# Patient Record
Sex: MALE | Race: BLACK OR AFRICAN AMERICAN | Employment: OTHER | URBAN - METROPOLITAN AREA
[De-identification: names, ages, dates, MRNs, and addresses within clinical notes are randomized per-mention and may not be internally consistent; named-entity substitution may affect disease eponyms.]

---

## 2018-08-06 PROBLEM — K74.60 CIRRHOSIS (HCC): Status: ACTIVE | Noted: 2018-08-06

## 2019-04-18 ENCOUNTER — APPOINTMENT (OUTPATIENT)
Dept: GENERAL RADIOLOGY | Age: 64
DRG: 280 | End: 2019-04-18
Payer: MEDICAID

## 2019-04-18 ENCOUNTER — HOSPITAL ENCOUNTER (INPATIENT)
Age: 64
LOS: 5 days | Discharge: HOME | DRG: 280 | End: 2019-04-23
Attending: EMERGENCY MEDICINE | Admitting: HOSPITALIST
Payer: MEDICAID

## 2019-04-18 DIAGNOSIS — E83.51 HYPOCALCEMIA: ICD-10-CM

## 2019-04-18 DIAGNOSIS — J90 PLEURAL EFFUSION: ICD-10-CM

## 2019-04-18 DIAGNOSIS — R60.1 ANASARCA: Primary | ICD-10-CM

## 2019-04-18 DIAGNOSIS — K70.31 ASCITES DUE TO ALCOHOLIC CIRRHOSIS (HCC): ICD-10-CM

## 2019-04-18 LAB
A/G RATIO: 0.4 (ref 1.1–2.2)
ALBUMIN SERPL-MCNC: 2.3 G/DL (ref 3.4–5)
ALP BLD-CCNC: 179 U/L (ref 40–129)
ALT SERPL-CCNC: 15 U/L (ref 10–40)
AMPHETAMINE SCREEN, URINE: ABNORMAL
ANION GAP SERPL CALCULATED.3IONS-SCNC: 8 MMOL/L (ref 3–16)
APTT: 43.9 SEC (ref 26–36)
AST SERPL-CCNC: 33 U/L (ref 15–37)
BARBITURATE SCREEN URINE: ABNORMAL
BASOPHILS ABSOLUTE: 0 K/UL (ref 0–0.2)
BASOPHILS RELATIVE PERCENT: 0.8 %
BENZODIAZEPINE SCREEN, URINE: ABNORMAL
BILIRUB SERPL-MCNC: 5.1 MG/DL (ref 0–1)
BILIRUBIN URINE: NEGATIVE
BLOOD, URINE: ABNORMAL
BUN BLDV-MCNC: 7 MG/DL (ref 7–20)
CALCIUM SERPL-MCNC: 7.7 MG/DL (ref 8.3–10.6)
CANNABINOID SCREEN URINE: ABNORMAL
CHLORIDE BLD-SCNC: 106 MMOL/L (ref 99–110)
CLARITY: CLEAR
CO2: 23 MMOL/L (ref 21–32)
COCAINE METABOLITE SCREEN URINE: ABNORMAL
COLOR: YELLOW
CREAT SERPL-MCNC: <0.5 MG/DL (ref 0.8–1.3)
EKG ATRIAL RATE: 81 BPM
EKG DIAGNOSIS: NORMAL
EKG P AXIS: 36 DEGREES
EKG P-R INTERVAL: 130 MS
EKG Q-T INTERVAL: 394 MS
EKG QRS DURATION: 72 MS
EKG QTC CALCULATION (BAZETT): 457 MS
EKG R AXIS: -19 DEGREES
EKG T AXIS: -5 DEGREES
EKG VENTRICULAR RATE: 81 BPM
EOSINOPHILS ABSOLUTE: 0.1 K/UL (ref 0–0.6)
EOSINOPHILS RELATIVE PERCENT: 1.2 %
EPITHELIAL CELLS, UA: 2 /HPF (ref 0–5)
ETHANOL: NORMAL MG/DL (ref 0–0.08)
GFR AFRICAN AMERICAN: >60
GFR NON-AFRICAN AMERICAN: >60
GLOBULIN: 5.3 G/DL
GLUCOSE BLD-MCNC: 140 MG/DL (ref 70–99)
GLUCOSE URINE: NEGATIVE MG/DL
HCT VFR BLD CALC: 36.1 % (ref 40.5–52.5)
HEMOGLOBIN: 11.7 G/DL (ref 13.5–17.5)
HYALINE CASTS: 2 /LPF (ref 0–8)
INR BLD: 1.93 (ref 0.86–1.14)
KETONES, URINE: NEGATIVE MG/DL
LEUKOCYTE ESTERASE, URINE: ABNORMAL
LYMPHOCYTES ABSOLUTE: 0.9 K/UL (ref 1–5.1)
LYMPHOCYTES RELATIVE PERCENT: 19 %
Lab: ABNORMAL
MAGNESIUM: 1.7 MG/DL (ref 1.8–2.4)
MCH RBC QN AUTO: 35.3 PG (ref 26–34)
MCHC RBC AUTO-ENTMCNC: 32.4 G/DL (ref 31–36)
MCV RBC AUTO: 109 FL (ref 80–100)
METHADONE SCREEN, URINE: ABNORMAL
MICROSCOPIC EXAMINATION: YES
MONOCYTES ABSOLUTE: 1 K/UL (ref 0–1.3)
MONOCYTES RELATIVE PERCENT: 20.2 %
NEUTROPHILS ABSOLUTE: 2.8 K/UL (ref 1.7–7.7)
NEUTROPHILS RELATIVE PERCENT: 58.8 %
NITRITE, URINE: NEGATIVE
OPIATE SCREEN URINE: POSITIVE
OXYCODONE URINE: ABNORMAL
PDW BLD-RTO: 16.4 % (ref 12.4–15.4)
PH UA: 6.5
PH UA: 7 (ref 5–8)
PHENCYCLIDINE SCREEN URINE: ABNORMAL
PLATELET # BLD: 66 K/UL (ref 135–450)
PLATELET SLIDE REVIEW: ABNORMAL
PMV BLD AUTO: 8.9 FL (ref 5–10.5)
POTASSIUM SERPL-SCNC: 3.8 MMOL/L (ref 3.5–5.1)
PRO-BNP: 108 PG/ML (ref 0–124)
PROPOXYPHENE SCREEN: ABNORMAL
PROTEIN UA: NEGATIVE MG/DL
PROTHROMBIN TIME: 22 SEC (ref 9.8–13)
RBC # BLD: 3.31 M/UL (ref 4.2–5.9)
RBC UA: 39 /HPF (ref 0–4)
SLIDE REVIEW: ABNORMAL
SODIUM BLD-SCNC: 137 MMOL/L (ref 136–145)
SPECIFIC GRAVITY UA: 1.01 (ref 1–1.03)
TOTAL PROTEIN: 7.6 G/DL (ref 6.4–8.2)
TROPONIN: <0.01 NG/ML
URINE REFLEX TO CULTURE: YES
URINE TYPE: ABNORMAL
UROBILINOGEN, URINE: 4 E.U./DL
WBC # BLD: 4.8 K/UL (ref 4–11)
WBC UA: 16 /HPF (ref 0–5)

## 2019-04-18 PROCEDURE — 6370000000 HC RX 637 (ALT 250 FOR IP): Performed by: HOSPITALIST

## 2019-04-18 PROCEDURE — 96375 TX/PRO/DX INJ NEW DRUG ADDON: CPT

## 2019-04-18 PROCEDURE — 2500000003 HC RX 250 WO HCPCS: Performed by: EMERGENCY MEDICINE

## 2019-04-18 PROCEDURE — 80307 DRUG TEST PRSMV CHEM ANLYZR: CPT

## 2019-04-18 PROCEDURE — 2580000003 HC RX 258: Performed by: HOSPITALIST

## 2019-04-18 PROCEDURE — 81001 URINALYSIS AUTO W/SCOPE: CPT

## 2019-04-18 PROCEDURE — 93005 ELECTROCARDIOGRAM TRACING: CPT | Performed by: PHYSICIAN ASSISTANT

## 2019-04-18 PROCEDURE — 85610 PROTHROMBIN TIME: CPT

## 2019-04-18 PROCEDURE — 84484 ASSAY OF TROPONIN QUANT: CPT

## 2019-04-18 PROCEDURE — 71046 X-RAY EXAM CHEST 2 VIEWS: CPT

## 2019-04-18 PROCEDURE — 99285 EMERGENCY DEPT VISIT HI MDM: CPT

## 2019-04-18 PROCEDURE — 6360000002 HC RX W HCPCS: Performed by: PHYSICIAN ASSISTANT

## 2019-04-18 PROCEDURE — 87186 SC STD MICRODIL/AGAR DIL: CPT

## 2019-04-18 PROCEDURE — 1200000000 HC SEMI PRIVATE

## 2019-04-18 PROCEDURE — 80053 COMPREHEN METABOLIC PANEL: CPT

## 2019-04-18 PROCEDURE — 83735 ASSAY OF MAGNESIUM: CPT

## 2019-04-18 PROCEDURE — 96376 TX/PRO/DX INJ SAME DRUG ADON: CPT

## 2019-04-18 PROCEDURE — 6360000002 HC RX W HCPCS: Performed by: HOSPITALIST

## 2019-04-18 PROCEDURE — 85025 COMPLETE CBC W/AUTO DIFF WBC: CPT

## 2019-04-18 PROCEDURE — G0480 DRUG TEST DEF 1-7 CLASSES: HCPCS

## 2019-04-18 PROCEDURE — 96366 THER/PROPH/DIAG IV INF ADDON: CPT

## 2019-04-18 PROCEDURE — 85730 THROMBOPLASTIN TIME PARTIAL: CPT

## 2019-04-18 PROCEDURE — 51702 INSERT TEMP BLADDER CATH: CPT

## 2019-04-18 PROCEDURE — 83880 ASSAY OF NATRIURETIC PEPTIDE: CPT

## 2019-04-18 PROCEDURE — 87086 URINE CULTURE/COLONY COUNT: CPT

## 2019-04-18 PROCEDURE — 6360000002 HC RX W HCPCS: Performed by: NURSE PRACTITIONER

## 2019-04-18 PROCEDURE — 2580000003 HC RX 258: Performed by: PHYSICIAN ASSISTANT

## 2019-04-18 PROCEDURE — 87077 CULTURE AEROBIC IDENTIFY: CPT

## 2019-04-18 PROCEDURE — 96365 THER/PROPH/DIAG IV INF INIT: CPT

## 2019-04-18 PROCEDURE — 93010 ELECTROCARDIOGRAM REPORT: CPT | Performed by: INTERNAL MEDICINE

## 2019-04-18 RX ORDER — SODIUM CHLORIDE 0.9 % (FLUSH) 0.9 %
10 SYRINGE (ML) INJECTION PRN
Status: DISCONTINUED | OUTPATIENT
Start: 2019-04-18 | End: 2019-04-23 | Stop reason: HOSPADM

## 2019-04-18 RX ORDER — NICOTINE 21 MG/24HR
1 PATCH, TRANSDERMAL 24 HOURS TRANSDERMAL DAILY
Status: DISCONTINUED | OUTPATIENT
Start: 2019-04-18 | End: 2019-04-23 | Stop reason: HOSPADM

## 2019-04-18 RX ORDER — ONDANSETRON 2 MG/ML
4 INJECTION INTRAMUSCULAR; INTRAVENOUS EVERY 6 HOURS PRN
Status: DISCONTINUED | OUTPATIENT
Start: 2019-04-18 | End: 2019-04-23 | Stop reason: HOSPADM

## 2019-04-18 RX ORDER — KETOROLAC TROMETHAMINE 15 MG/ML
15 INJECTION, SOLUTION INTRAMUSCULAR; INTRAVENOUS ONCE
Status: COMPLETED | OUTPATIENT
Start: 2019-04-18 | End: 2019-04-18

## 2019-04-18 RX ORDER — FUROSEMIDE 10 MG/ML
80 INJECTION INTRAMUSCULAR; INTRAVENOUS 2 TIMES DAILY
Status: DISCONTINUED | OUTPATIENT
Start: 2019-04-18 | End: 2019-04-19

## 2019-04-18 RX ORDER — SODIUM CHLORIDE 0.9 % (FLUSH) 0.9 %
10 SYRINGE (ML) INJECTION EVERY 12 HOURS SCHEDULED
Status: DISCONTINUED | OUTPATIENT
Start: 2019-04-18 | End: 2019-04-23 | Stop reason: HOSPADM

## 2019-04-18 RX ORDER — FUROSEMIDE 10 MG/ML
80 INJECTION INTRAMUSCULAR; INTRAVENOUS ONCE
Status: COMPLETED | OUTPATIENT
Start: 2019-04-18 | End: 2019-04-18

## 2019-04-18 RX ORDER — HYDROMORPHONE HYDROCHLORIDE 1 MG/ML
1 INJECTION, SOLUTION INTRAMUSCULAR; INTRAVENOUS; SUBCUTANEOUS
Status: COMPLETED | OUTPATIENT
Start: 2019-04-18 | End: 2019-04-18

## 2019-04-18 RX ORDER — ONDANSETRON 2 MG/ML
4 INJECTION INTRAMUSCULAR; INTRAVENOUS ONCE
Status: COMPLETED | OUTPATIENT
Start: 2019-04-18 | End: 2019-04-18

## 2019-04-18 RX ORDER — MORPHINE SULFATE 2 MG/ML
2 INJECTION, SOLUTION INTRAMUSCULAR; INTRAVENOUS ONCE
Status: COMPLETED | OUTPATIENT
Start: 2019-04-18 | End: 2019-04-18

## 2019-04-18 RX ADMIN — FUROSEMIDE 80 MG: 10 INJECTION, SOLUTION INTRAMUSCULAR; INTRAVENOUS at 19:07

## 2019-04-18 RX ADMIN — FUROSEMIDE 80 MG: 10 INJECTION, SOLUTION INTRAVENOUS at 11:25

## 2019-04-18 RX ADMIN — KETOROLAC TROMETHAMINE 15 MG: 15 INJECTION, SOLUTION INTRAMUSCULAR; INTRAVENOUS at 22:47

## 2019-04-18 RX ADMIN — HYDROMORPHONE HYDROCHLORIDE 1 MG: 1 INJECTION, SOLUTION INTRAMUSCULAR; INTRAVENOUS; SUBCUTANEOUS at 11:25

## 2019-04-18 RX ADMIN — ONDANSETRON 4 MG: 2 INJECTION INTRAMUSCULAR; INTRAVENOUS at 11:25

## 2019-04-18 RX ADMIN — MORPHINE SULFATE 2 MG: 2 INJECTION, SOLUTION INTRAMUSCULAR; INTRAVENOUS at 12:21

## 2019-04-18 RX ADMIN — Medication 10 ML: at 22:48

## 2019-04-18 RX ADMIN — CALCIUM GLUCONATE 1 G: 94 INJECTION, SOLUTION INTRAVENOUS at 12:59

## 2019-04-18 RX ADMIN — HYDROMORPHONE HYDROCHLORIDE 1 MG: 1 INJECTION, SOLUTION INTRAMUSCULAR; INTRAVENOUS; SUBCUTANEOUS at 14:32

## 2019-04-18 SDOH — HEALTH STABILITY: MENTAL HEALTH: HOW OFTEN DO YOU HAVE A DRINK CONTAINING ALCOHOL?: NEVER

## 2019-04-18 ASSESSMENT — ENCOUNTER SYMPTOMS
WHEEZING: 0
VOMITING: 0
RECTAL PAIN: 0
SHORTNESS OF BREATH: 0
DIARRHEA: 0
ABDOMINAL PAIN: 0
NAUSEA: 0
BACK PAIN: 0
ABDOMINAL DISTENTION: 1
CONSTIPATION: 0
COUGH: 0
COLOR CHANGE: 0
STRIDOR: 0

## 2019-04-18 ASSESSMENT — PAIN DESCRIPTION - LOCATION
LOCATION: GENERALIZED
LOCATION: GENERALIZED

## 2019-04-18 ASSESSMENT — PAIN SCALES - GENERAL
PAINLEVEL_OUTOF10: 10
PAINLEVEL_OUTOF10: 6
PAINLEVEL_OUTOF10: 10

## 2019-04-18 ASSESSMENT — PAIN DESCRIPTION - PAIN TYPE
TYPE: ACUTE PAIN
TYPE: ACUTE PAIN

## 2019-04-18 NOTE — ED PROVIDER NOTES
I independently performed a history and physical on [employer]. All diagnostic, treatment, and disposition decisions were made by myself in conjunction with the advanced practice provider. Briefly, this is a 61 y.o. male here for a rather significant lower extremity edema, and actually generally anasarca. This been worse over the past couple weeks. He states that he has pain all over. Denies alcohol use. .  Symptoms are moderate to severe worsening with activity. He does live at home  See YUNI note      On exam:  Constitutional:  Well developed, well nourished, non-toxic appearance . Anasarca  Eyes:  PERRL, conjunctiva normal   HENT:  Atraumatic, external ears normal,   Respiratory:  No respiratory distress, normal breath sounds, + rales, no wheezing   Cardiovascular:  Normal rate, normal rhythm,   GI:  Soft, nondistended, nontender, no obvious organomegaly, no mass, no rebound, no guarding   Musculoskeletal:  No tenderness, no deformities. Significant lower extremity edema, which appears symmetrical.  Integument:  no rashes on exposed surfaces  Neurologic:  Alert & oriented x 3,  normalmotor function, normal sensory function, no focal deficits noted   Psychiatric:  Speech and behavior appropriate. No agitation. EKG     EKG 12 Lead (Preliminary result)    Component (Lab Inquiry)   Collection Time Result Time Ventricular Rate Atrial Rate P-R Interval QRS Duration Q-T Interval QTc Calculation (Bazett) P Axis R Axis T Axis Diagnosis   04/18/19 10:59:12 04/18/19 11:17:04 81 81 130 72 394 457 36 -19 -5 Sinus rhythm with Premature atrial complexes with Aberrant conductionNonspecific T wave abnormalityAbnormal ECG           Screenings            MDM    Amalia Butler is to admit. Patient has quite an impressive physical exam finding of anasarca, lower extremity edema. He is quite uncomfortable. Although vital signs look reassuring today. He does have a low calcium for which we will replete in the ER. Other labs can be added on, etc.      SEE YUNI NOTE      Critical Care  There was a high probability of life-threatening clinical deterioration in the patient's condition requiring my urgent intervention. Total critical care time with the patient was 36 minutes excluding separately reportable procedures. Critical care required due to patients hypocalcemia, anasarca      Patient Referrals:  No follow-up provider specified. Discharge Medications:  New Prescriptions    No medications on file       FINAL IMPRESSION  1. Anasarca    2. Pleural effusion    3. Hypocalcemia        Blood pressure (!) 144/88, pulse 90, temperature 97.9 °F (36.6 °C), temperature source Oral, resp. rate 18, height 6' 2\" (1.88 m), weight 275 lb (124.7 kg), SpO2 97 %. For further details of [facility] emergency department encounter, please see documentation by advanced practice provider.        Georgie Kerr III,   04/18/19 6085

## 2019-04-18 NOTE — ED NOTES
Pharmacy Medication History Note      List of current medications patient is taking is complete. Source of information: patient    Changes made to medication list:  Medications flagged for removal (include reason, ex. noncompliance):  N/A    Medications removed (include reason, ex. therapy complete or physician discontinued):  N/A    Medications added/doses adjusted:  N/A    Other notes (ex. Recent course of antibiotics, Coumadin dosing):  Denies use of other OTC or herbal medications. Last dose times updated. [facility]    No current facility-administered medications on file prior to encounter.         Current Outpatient Medications on File Prior to Encounter   Medication Sig Dispense Refill    [DISCONTINUED] furosemide (LASIX) 40 MG tablet Take 1 tablet by mouth daily 60 tablet 3    [DISCONTINUED] spironolactone (ALDACTONE) 100 MG tablet Take 1 tablet by mouth daily 30 tablet 3    [DISCONTINUED] nicotine (NICODERM CQ) 14 MG/24HR Place 1 patch onto the skin daily 30 patch 3    [DISCONTINUED] Multiple Vitamins-Minerals (THERAPEUTIC MULTIVITAMIN-MINERALS) tablet Take 1 tablet by mouth daily 30 tablet 0

## 2019-04-18 NOTE — ED NOTES
Pt medicated as charted - given a urinal - lights dimmed. Pt updated on POC. Pt positioned for comfort. Call light in reach. Bed locked and in low position. Pt denies any needs or concerns at this time.      Rob Khan RN  04/18/19 8830

## 2019-04-18 NOTE — H&P
Hospital Medicine History & Physical      PCP: No primary care provider on file. Date of Admission: 4/18/2019    Date of Service: Pt seen/examined on 04/18/19 and Admitted to Inpatient with expected LOS greater than two midnights due to medical therapy. Chief Complaint   Patient presents with    Leg Swelling     Pt reports edema in several places, left arm, legs, abdomen x 2 weeks. Pt does not have a doctor. States today he just couldn't take it anymore. History Of Present Illness:   Wen Claros is a 61 y.o. male with PMx of anasarca and alcoholic cirrhosis of liver with ascites, who presented to [facility] ED due to peripheral edema in the lower extremities, penis, scrotum and abdomen that has significantly increased over the past few months. Pt reports having a similar episode of swelling in August of 2018 for which he received inpatient treatment; was discharged on lasix but states he has been noncompliant with medication or follow up. He reports gaining 75lbs in the past few month, and the swelling to be increasingly uncomfortable. He denies shortness of breath or chest pain. He denies any recent alcohol consumption, states his last drink was in August of 2018. He smokes cigarettes 1/4 ppd. Past Medical History:  History reviewed. No pertinent past medical history. Past Surgical History:    History reviewed. No pertinent surgical history. Medications Prior to Admission:    Prior to Admission medications    Not on File       Allergies:  Patient has no known allergies. Social History:    The patient currently lives at home by himself     TOBACCO:   reports that he has been smoking. He has been smoking about 0.25 packs per day. He has never used smokeless tobacco.  ETOH:   reports that he drank alcohol. Family History:     Reviewed in detail and negative for DM, CAD, Cancer, CVA.  Positive as follows:        Problem Relation Age of Onset    High Blood Pressure Mother        REVIEW OF SYSTEMS:   Pertinent positives as noted in the HPI. All other systems reviewed and negative. PHYSICAL EXAM PERFORMED:  /81   Pulse 80   Temp 97.9 °F (36.6 °C) (Oral)   Resp 12   Ht 6' 2\" (1.88 m)   Wt 275 lb (124.7 kg)   SpO2 99%   BMI 35.31 kg/m²     General appearance:  No apparent distress, appears stated age and cooperative. HEENT:  Normal cephalic, atraumatic without obvious deformity. Pupils equal, round, and reactive to light. Extra ocular muscles intact. Conjunctivae/corneas clear. Scleral icterus is present   Neck: Supple, with full range of motion. No jugular venous distention. Trachea midline. Respiratory:  Normal respiratory effort. Clear to auscultation, bilaterally without Rales/Wheezes/Rhonchi. Cardiovascular:  Regular rate and rhythm with normal S1/S2 without murmurs, rubs or gallops. Abdomen: Tense, he exhibits significant ascites and umbilical hernia is present. He is non-tender with normal bowel sounds. Musculoskeletal:  No clubbing, cyanosis, erythema. Full range of motion without deformity. Skin: Skin color, texture, turgor normal.  No rashes or lesions. 3+ peripheral edema:significant lower extremity, abdomina, pubic, and scrotal edema. Neurologic:  Neurovascularly intact without any focal sensory/motor deficits.  Cranial nerves: II-XII intact, grossly non-focal.  Psychiatric:  Alert and oriented, thought content appropriate, normal insight  Capillary Refill: Brisk,< 3 seconds   Peripheral Pulses: +2 palpable, equal bilaterally       Labs:   Recent Labs     04/18/19  1056   WBC 4.8   HGB 11.7*   HCT 36.1*   PLT 66*     Recent Labs     04/18/19  1056      K 3.8      CO2 23   BUN 7   CREATININE <0.5*   CALCIUM 7.7*     Recent Labs     04/18/19  1056   AST 33   ALT 15   BILITOT 5.1*   ALKPHOS 179*     Recent Labs     04/18/19  1056   INR 1.93*     Recent Labs     04/18/19  1056   TROPONINI <0.01       Urinalysis:    Lab Results Component Value Date    NITRU Negative 04/18/2019    WBCUA 16 04/18/2019    BACTERIA 3+ 08/06/2018    RBCUA 39 04/18/2019    BLOODU MODERATE 04/18/2019    SPECGRAV 1.008 04/18/2019    GLUCOSEU Negative 04/18/2019       XR CHEST STANDARD (2 VW)   Final Result   Large left pleural effusion with likely underlying airspace disease   reflecting atelectasis or infiltrate. Other underlying pathology not   excluded. Recommend follow-up to resolution. Mild right basilar likely scarring or atelectasis. Small right pleural   effusion not entirely excluded. Active Hospital Problems    Diagnosis Date Noted    Anasarca [R60.1] 04/18/2019   Pleural Effusion   Hypervolemia secondary to liver cirrhosis   Chronic macrocytic anemia     ASSESSMENT/PLAN:  1. Hypervolemia secondary to liver cirrhosis   -Lasix 80mg BID   -Paracentesis   -Strict I/Os and daily weights  -Repeat BMP in am   -Consult GI for further evaluation and management     2. Pleural Effusion   -CXR reveled large left sided pleural effusion   -Pt not complaining of shortness of breath or chest pain. Plan to start removing fluid from abdomen first then schedule thoracentesis    3. Chronic Macrocytic Anemia   -Likely due to liver cirrhosis, repeat CBC in am and monitor     4. Tobacco abuse   -advised smoking cessation and offered nicotine patch     5. Hx of alcohol abuse   -Pt denies any alcohol consumption since August of 2018  -Advised patient to continue to refrain from any alcohol use     DVT Prophylaxis: Lovenox   Diet: No diet orders on file  Code Status: Prior      Patient case seen and discussed under supervision of preceptor, Dr. Cindi Soliman MD.   Note made by PA-S2 student in the status of a scribe, with review by preceptor. Kasandra MACHUCA-S2     Addendum to PA student H& P note:  Pt seen,examined and evaluated with BRIGETTE farr , who acted as my scribe for the above documentation. . I have reviewed the current history, physical findings, labs and assessment and plan and agree with note as documented      Octaviano Marie MD  Hospitalist Physician     The note was completed using EMR. Every effort was made to ensure accuracy; However, inadvertent computerized transcription errors may be present. Thank you No primary care provider on file. for the opportunity to be involved in this patient's care. If you have any questions or concerns please feel free to contact me at [phone].

## 2019-04-18 NOTE — ED NOTES
Assume patient care at this time. Agree with previous assessment - pt with pitting edema in all extremities. No resp distress noted. PIV placed and labs obtained. Current Plan of Care reviewed with patient. Pt denies any needs or questions at this time. Bed locked and in low position, with side rails up x 2.       Bridget Carrera RN  04/18/19 2622

## 2019-04-18 NOTE — ED PROVIDER NOTES
[address] [facility]  eMERGENCY dEPARTMENT eNCOUnter        Pt Name: Michel Palacios  MRN: [id number]  Robgfzachariah 1955  Date of evaluation: 4/18/2019  Provider: Peggy Braon PA-C  PCP: No primary care provider on file. This patient was seen and evaluated by the attending physician Darylene Simmering III, [address] [facility]       Chief Complaint   Patient presents with    Leg Swelling     Pt reports edema in several places, left arm, legs, abdomen x 2 weeks. Pt does not have a doctor. States today he just couldn't take it anymore. HISTORY OF PRESENT ILLNESS   (Location/Symptom, Timing/Onset, Context/Setting, Quality, Duration, Modifying Factors, Severity)  Note limiting factors. Michel Palacios is a 61 y.o. male who presents to the emergency department complaining of peripheral edema in the lower extremities, penis, scrotum and abdomen. This has been a progressive issue for several months. He does have history of anasarca but admits that he has been noncompliant with follow-up and taking medications. He has not taken any medications for approximately 8 months. He says that the swelling is uncomfortable and rates his discomfort to be a 10 out of 10 on pain scale. Despite this, patient is laughing during history and does not appear to be writhing in pain, pale, diaphoretic or in acute distress. He is not an alcoholic. Denies shortness of breath or chest pain. Nursing Notes were all reviewed and agreed with or any disagreements were addressed  in the HPI. REVIEW OF SYSTEMS    (2-9 systems for level 4, 10 or more for level 5)     Review of Systems   Constitutional: Positive for unexpected weight change (weight gain). Negative for chills and fever. Eyes: Negative for visual disturbance. Respiratory: Negative for cough, shortness of breath, wheezing and stridor. Cardiovascular: Positive for leg swelling.  Negative for chest pain and palpitations. Gastrointestinal: Positive for abdominal distention. Negative for abdominal pain, constipation, diarrhea, nausea, rectal pain and vomiting. Genitourinary: Positive for penile swelling and scrotal swelling. Negative for difficulty urinating, dysuria and urgency. Musculoskeletal: Negative for back pain, neck pain and neck stiffness. Skin: Negative for color change, pallor, rash and wound. Neurological: Negative for dizziness, tremors, seizures, syncope, facial asymmetry, speech difficulty, weakness, light-headedness, numbness and headaches. Psychiatric/Behavioral: Negative for confusion. All other systems reviewed and are negative. Positives and Pertinent negatives as per HPI. Except as noted abovein the ROS, all other systems were reviewed and negative. PAST MEDICAL HISTORY   History reviewed. No pertinent past medical history. SURGICAL HISTORY   History reviewed. No pertinent surgical history. CURRENTMEDICATIONS       Previous Medications    No medications on file         ALLERGIES     Patient has no known allergies.     FAMILYHISTORY       Family History   Problem Relation Age of Onset    High Blood Pressure Mother           SOCIAL HISTORY       Social History     Socioeconomic History    Marital status:      Spouse name: None    Number of children: None    Years of education: None    Highest education level: None   Occupational History    None   Social Needs    Financial resource strain: None    Food insecurity:     Worry: None     Inability: None    Transportation needs:     Medical: None     Non-medical: None   Tobacco Use    Smoking status: Current Every Day Smoker     Packs/day: 0.25    Smokeless tobacco: Never Used   Substance and Sexual Activity    Alcohol use: Not Currently     Frequency: Never    Drug use: None    Sexual activity: None   Lifestyle    Physical activity:     Days per week: None     Minutes per session: None    Stress: None   Relationships    Social connections:     Talks on phone: None     Gets together: None     Attends Anabaptist service: None     Active member of club or organization: None     Attends meetings of clubs or organizations: None     Relationship status: None    Intimate partner violence:     Fear of current or ex partner: None     Emotionally abused: None     Physically abused: None     Forced sexual activity: None   Other Topics Concern    None   Social History Narrative    None       SCREENINGS             PHYSICAL EXAM    (up to 7 for level 4, 8 or more for level 5)     ED Triage Vitals [04/18/19 1014]   BP Temp Temp Source Pulse Resp SpO2 Height Weight   (!) 144/88 97.9 °F (36.6 °C) Oral 90 18 97 % 6' 2\" (1.88 m) 275 lb (124.7 kg)       Physical Exam   Constitutional: He is oriented to person, place, and time. He appears well-developed and well-nourished. No distress. HENT:   Head: Normocephalic and atraumatic. Right Ear: External ear normal.   Left Ear: External ear normal.   Nose: Nose normal.   Mouth/Throat: Oropharynx is clear and moist.   Eyes: Pupils are equal, round, and reactive to light. Conjunctivae and EOM are normal. Right eye exhibits no discharge. Left eye exhibits no discharge. Scleral icterus is present. Neck: Normal range of motion. No JVD present. Cardiovascular: Normal rate. Pulmonary/Chest: Effort normal.   Diminished bibasilar breath sounds   Abdominal: Bowel sounds are normal. He exhibits ascites. There is no tenderness. There is no rigidity, no rebound, no guarding, no CVA tenderness, no tenderness at McBurney's point and negative Rodgers's sign. Musculoskeletal: Normal range of motion. Lymphadenopathy:     He has no cervical adenopathy. Neurological: He is alert and oriented to person, place, and time. Skin: Skin is warm and dry. Capillary refill takes less than 2 seconds. No rash noted. He is not diaphoretic. No erythema. No pallor.    Significant lower extremity, abdominal, pubic, penile, scrotal edema. Psychiatric: He has a normal mood and affect. His behavior is normal.   Nursing note and vitals reviewed.       DIAGNOSTIC RESULTS   LABS:    Labs Reviewed   CBC WITH AUTO DIFFERENTIAL - Abnormal; Notable for the following components:       Result Value    RBC 3.31 (*)     Hemoglobin 11.7 (*)     Hematocrit 36.1 (*)     .0 (*)     MCH 35.3 (*)     RDW 16.4 (*)     Platelets 66 (*)     Lymphocytes # 0.9 (*)     All other components within normal limits    Narrative:     Performed at:  [facility] [address], [place], [facility]   Phone [phone]   COMPREHENSIVE METABOLIC PANEL - Abnormal; Notable for the following components:    Glucose 140 (*)     CREATININE <0.5 (*)     Calcium 7.7 (*)     Alb 2.3 (*)     Albumin/Globulin Ratio 0.4 (*)     Total Bilirubin 5.1 (*)     Alkaline Phosphatase 179 (*)     All other components within normal limits    Narrative:     Performed at:  [facility] [address], [place], [facility]   Phone [phone]   URINE RT REFLEX TO CULTURE - Abnormal; Notable for the following components:    Blood, Urine MODERATE (*)     Urobilinogen, Urine 4.0 (*)     Leukocyte Esterase, Urine SMALL (*)     All other components within normal limits    Narrative:     Performed at:  [facility] [address], [place], [facility]   Phone [phone]   APTT - Abnormal; Notable for the following components:    aPTT 43.9 (*)     All other components within normal limits    Narrative:     Performed at:  [facility] [address], [place], [facility]   Phone [phone]   PROTIME-INR - Abnormal; Notable for the following components:    Protime 22.0 (*)     INR 1.93 (*)     All other components within normal limits    Narrative:     Performed at:  [facility]  [facility],  [facility], [address] [facility]   Phone [phone]   MAGNESIUM - Abnormal; Notable for the following components:    Magnesium 1.70 (*)     All other components within normal limits    Narrative:     Performed at:  [facility] [address],  [facility], [address]   Phone [phone]   MICROSCOPIC URINALYSIS - Abnormal; Notable for the following components:    WBC, UA 16 (*)     RBC, UA 39 (*)     All other components within normal limits    Narrative:     Performed at:  [facility] [address],  [facility], [address]   Phone [phone]   URINE CULTURE   TROPONIN    Narrative:     Performed at:  [facility] [address],  [facility], [address]   Phone [phone]    Narrative:     Performed at:  [facility] [address],  [facility], [address]   Phone [phone]   URINE DRUG SCREEN   ETHANOL       All other labs were within normal range or not returned as of this dictation. EKG: All EKG's are interpreted by the Emergency Department Physician who either signs orCo-signs this chart in the absence of a cardiologist.  Please see their note for interpretation of EKG. RADIOLOGY:   Non-plain film images such as CT, Ultrasound and MRI are read by the radiologist. Plain radiographic images are visualized andpreliminarily interpreted by the  ED Provider with the below findings:        Interpretation perthe Radiologist below, if available at the time of this note:    XR CHEST STANDARD (2 VW)   Preliminary Result   Large left pleural effusion with likely underlying airspace disease   reflecting atelectasis or infiltrate. Other underlying pathology not   excluded. Recommend follow-up to resolution. Mild right basilar likely scarring or atelectasis. Small right pleural   effusion not entirely excluded. PROCEDURES   Unless otherwise noted below, none     Procedures    CRITICAL CARE TIME   N/A    CONSULTS:  Maximo Baker HOSPITALIST  Dr. Raffi Lockhart will admit [phone]) wants us to add on ethanol and urine drug screen. Says to wait for urine culture. No instruction for antibiotic at this time. EMERGENCY DEPARTMENT COURSE and DIFFERENTIALDIAGNOSIS/MDM:   Vitals:    Vitals:    04/18/19 1014   BP: (!) 144/88   Pulse: 90   Resp: 18   Temp: 97.9 °F (36.6 °C)   TempSrc: Oral   SpO2: 97%   Weight: 275 lb (124.7 kg)   Height: 6' 2\" (1.88 m)       Patient was given thefollowing medications:  Medications   calcium gluconate 1 g in dextrose 5 % 100 mL IVPB (has no administration in time range)   morphine (PF) injection 2 mg (2 mg Intravenous Given 4/18/19 1221)   ondansetron (ZOFRAN) injection 4 mg (4 mg Intravenous Given 4/18/19 1125)   furosemide (LASIX) injection 80 mg (80 mg Intravenous Given 4/18/19 1125)   HYDROmorphone HCl PF (DILAUDID) injection 1 mg (1 mg Intravenous Given 4/18/19 1125)     This patient presents with anasarca. This is a chronic issue for the patient but has been noncompliant with medications. He has severe edema to the abdomen, scrotal/pubic area, lower extremities. Blood work reveals hypocalcemia, hypoalbuminemia, hyperbilirubinemia, hypomagnesemia. Chest x-ray is showing large pleural effusion. I did order a medication for discomfort, Lasix for fluid overloaded status, calcium for hypocalcemia. We do feel admission is warranted for further evaluation and treatment. Patient understands and agrees with plan. My suspicion is low for ACS, PE, myocarditis, pericarditis, endocarditis,  pericardial effusion, cardiac tamponade,  thoracic aortic dissection, esophageal rupture, other life-threatening arrhythmia, hypertensive urgency or emergency, hemothorax, pulmonary contusion, subcutaneous emphysema, flail chest, pneumo mediastinum, rib fracture, pneumonia, acute surgical abdomen, SBP,

## 2019-04-18 NOTE — CARE COORDINATION
Discharge Planning Assessment  SW discharge planner met with patient to discuss reason for admission, current living situation, and potential needs at the time of discharge. Pt in ED d/t anasarca and pleural effusion    Demographics/Insurance verified Yes    Current type of dwelling:  House    Living arrangements:  Alone     Level of function/Support:  Pt reported independent with ADLs and functioning. Pt reports still speaks with spouse but does not live together. PCP:  None. Pt requested assistance finding PCP. SW provided [phone] number to call to find [facility] PCP. Last Visit to PCP:  n/a    DME:  None. No needs reported. Active with any community resources/agencies/skilled home care:  None. No non-skilled needs reported. Medication compliance issues:  Pt reported has not been taking medications d/t cost.  Pt agreeable to resources. SW provided local, national and online resources to help pay for meds. SW also informed Medicaid insurance helps pay for a large part of most med costs. Financial issues that could impact healthcare:  No    Transportation at the time of discharge:  Self    Tentative discharge plan:  Home most likely. Provided [id number] to help find PCP and medication assistance program information. No other needs stated at this time.     Electronically signed by BUNNY Tran, SANDORW on 4/18/2019 at 2:30 PM

## 2019-04-19 ENCOUNTER — APPOINTMENT (OUTPATIENT)
Dept: INTERVENTIONAL RADIOLOGY/VASCULAR | Age: 64
DRG: 280 | End: 2019-04-19
Payer: MEDICAID

## 2019-04-19 LAB
ANION GAP SERPL CALCULATED.3IONS-SCNC: 5 MMOL/L (ref 3–16)
BUN BLDV-MCNC: 8 MG/DL (ref 7–20)
CALCIUM SERPL-MCNC: 7.6 MG/DL (ref 8.3–10.6)
CHLORIDE BLD-SCNC: 105 MMOL/L (ref 99–110)
CO2: 27 MMOL/L (ref 21–32)
CREAT SERPL-MCNC: 0.7 MG/DL (ref 0.8–1.3)
GFR AFRICAN AMERICAN: >60
GFR NON-AFRICAN AMERICAN: >60
GLUCOSE BLD-MCNC: 118 MG/DL (ref 70–99)
HCT VFR BLD CALC: 32.8 % (ref 40.5–52.5)
HEMOGLOBIN: 10.9 G/DL (ref 13.5–17.5)
MAGNESIUM: 1.6 MG/DL (ref 1.8–2.4)
MCH RBC QN AUTO: 35.1 PG (ref 26–34)
MCHC RBC AUTO-ENTMCNC: 33.3 G/DL (ref 31–36)
MCV RBC AUTO: 105.4 FL (ref 80–100)
PDW BLD-RTO: 16 % (ref 12.4–15.4)
PHOSPHORUS: 2.6 MG/DL (ref 2.5–4.9)
PLATELET # BLD: 70 K/UL (ref 135–450)
PMV BLD AUTO: 9.2 FL (ref 5–10.5)
POTASSIUM SERPL-SCNC: 3.1 MMOL/L (ref 3.5–5.1)
RBC # BLD: 3.11 M/UL (ref 4.2–5.9)
SODIUM BLD-SCNC: 137 MMOL/L (ref 136–145)
WBC # BLD: 6.1 K/UL (ref 4–11)

## 2019-04-19 PROCEDURE — 6370000000 HC RX 637 (ALT 250 FOR IP): Performed by: HOSPITALIST

## 2019-04-19 PROCEDURE — 49083 ABD PARACENTESIS W/IMAGING: CPT

## 2019-04-19 PROCEDURE — 80048 BASIC METABOLIC PNL TOTAL CA: CPT

## 2019-04-19 PROCEDURE — 6360000002 HC RX W HCPCS: Performed by: HOSPITALIST

## 2019-04-19 PROCEDURE — 85027 COMPLETE CBC AUTOMATED: CPT

## 2019-04-19 PROCEDURE — 84100 ASSAY OF PHOSPHORUS: CPT

## 2019-04-19 PROCEDURE — 83735 ASSAY OF MAGNESIUM: CPT

## 2019-04-19 PROCEDURE — 6370000000 HC RX 637 (ALT 250 FOR IP): Performed by: INTERNAL MEDICINE

## 2019-04-19 PROCEDURE — C1729 CATH, DRAINAGE: HCPCS

## 2019-04-19 PROCEDURE — 1200000000 HC SEMI PRIVATE

## 2019-04-19 PROCEDURE — 36415 COLL VENOUS BLD VENIPUNCTURE: CPT

## 2019-04-19 PROCEDURE — 2580000003 HC RX 258: Performed by: HOSPITALIST

## 2019-04-19 RX ORDER — MORPHINE SULFATE 2 MG/ML
2 INJECTION, SOLUTION INTRAMUSCULAR; INTRAVENOUS
Status: DISCONTINUED | OUTPATIENT
Start: 2019-04-19 | End: 2019-04-21

## 2019-04-19 RX ORDER — SPIRONOLACTONE 25 MG/1
100 TABLET ORAL DAILY
Status: DISCONTINUED | OUTPATIENT
Start: 2019-04-19 | End: 2019-04-23 | Stop reason: HOSPADM

## 2019-04-19 RX ORDER — POTASSIUM CHLORIDE 20 MEQ/1
40 TABLET, EXTENDED RELEASE ORAL PRN
Status: DISCONTINUED | OUTPATIENT
Start: 2019-04-19 | End: 2019-04-23 | Stop reason: HOSPADM

## 2019-04-19 RX ORDER — FUROSEMIDE 40 MG/1
40 TABLET ORAL DAILY
Status: DISCONTINUED | OUTPATIENT
Start: 2019-04-19 | End: 2019-04-20

## 2019-04-19 RX ORDER — POTASSIUM CHLORIDE 7.45 MG/ML
10 INJECTION INTRAVENOUS PRN
Status: DISCONTINUED | OUTPATIENT
Start: 2019-04-19 | End: 2019-04-23 | Stop reason: HOSPADM

## 2019-04-19 RX ORDER — OXYCODONE HYDROCHLORIDE 5 MG/1
5 TABLET ORAL EVERY 4 HOURS PRN
Status: DISCONTINUED | OUTPATIENT
Start: 2019-04-19 | End: 2019-04-20

## 2019-04-19 RX ADMIN — OXYCODONE HYDROCHLORIDE 5 MG: 5 TABLET ORAL at 21:52

## 2019-04-19 RX ADMIN — SPIRONOLACTONE 100 MG: 25 TABLET ORAL at 08:50

## 2019-04-19 RX ADMIN — MORPHINE SULFATE 2 MG: 2 INJECTION, SOLUTION INTRAMUSCULAR; INTRAVENOUS at 15:38

## 2019-04-19 RX ADMIN — MORPHINE SULFATE 2 MG: 2 INJECTION, SOLUTION INTRAMUSCULAR; INTRAVENOUS at 23:04

## 2019-04-19 RX ADMIN — MORPHINE SULFATE 2 MG: 2 INJECTION, SOLUTION INTRAMUSCULAR; INTRAVENOUS at 18:30

## 2019-04-19 RX ADMIN — OXYCODONE HYDROCHLORIDE 5 MG: 5 TABLET ORAL at 17:13

## 2019-04-19 RX ADMIN — POTASSIUM CHLORIDE 40 MEQ: 1500 TABLET, EXTENDED RELEASE ORAL at 12:36

## 2019-04-19 RX ADMIN — MORPHINE SULFATE 2 MG: 2 INJECTION, SOLUTION INTRAMUSCULAR; INTRAVENOUS at 12:32

## 2019-04-19 RX ADMIN — Medication 10 ML: at 23:05

## 2019-04-19 RX ADMIN — FUROSEMIDE 80 MG: 10 INJECTION, SOLUTION INTRAMUSCULAR; INTRAVENOUS at 08:18

## 2019-04-19 RX ADMIN — OXYCODONE HYDROCHLORIDE 5 MG: 5 TABLET ORAL at 13:49

## 2019-04-19 RX ADMIN — OXYCODONE HYDROCHLORIDE 5 MG: 5 TABLET ORAL at 08:51

## 2019-04-19 ASSESSMENT — PAIN SCALES - GENERAL
PAINLEVEL_OUTOF10: 9
PAINLEVEL_OUTOF10: 7
PAINLEVEL_OUTOF10: 7
PAINLEVEL_OUTOF10: 8
PAINLEVEL_OUTOF10: 0
PAINLEVEL_OUTOF10: 9
PAINLEVEL_OUTOF10: 7
PAINLEVEL_OUTOF10: 10
PAINLEVEL_OUTOF10: 10
PAINLEVEL_OUTOF10: 7

## 2019-04-19 NOTE — PLAN OF CARE
Problem: Falls - Risk of:  Goal: Will remain free from falls  Description  Will remain free from falls  4/19/2019 1550 by Helen Prabhakar RN  Outcome: Ongoing  4/19/2019 0322 by Dave Kraus RN  Outcome: Ongoing  4/19/2019 0321 by Dave Kraus RN  Outcome: Ongoing  Note:   Assess risk factors for falls  Assess fall prevention measures  Assist ambulation and toileting    4/19/2019 0320 by Dave Kraus RN  Outcome: Ongoing  Note:   Assess risk factors for falls  Assess fall prevention measures  Assist ambulation and toileting    Goal: Absence of physical injury  Description  Absence of physical injury  4/19/2019 1550 by Helen Prabhakar RN  Outcome: Ongoing  4/19/2019 0322 by Dave Kraus RN  Outcome: Ongoing     Problem: Pain:  Goal: Pain level will decrease  Description  Pain level will decrease  4/19/2019 1550 by Helen Prabhakar RN  Outcome: Ongoing  4/19/2019 0322 by Dave Kraus RN  Outcome: Ongoing  4/19/2019 0321 by Dave Kraus RN  Outcome: Ongoing  4/19/2019 0320 by Dave Kraus RN  Outcome: Ongoing  Goal: Control of acute pain  Description  Control of acute pain  4/19/2019 1550 by Helen Prabhakar RN  Outcome: Ongoing  4/19/2019 0322 by Dave Kraus RN  Outcome: Ongoing  Goal: Control of chronic pain  Description  Control of chronic pain  4/19/2019 1550 by Helen Prabhakar RN  Outcome: Ongoing  4/19/2019 0322 by Dave Kraus RN  Outcome: Ongoing     Problem: Fluid Volume:  Goal: Hemodynamic stability will improve  Description  Hemodynamic stability will improve  4/19/2019 1550 by Helen Prabhakar RN  Outcome: Ongoing  4/19/2019 0322 by Dave Kraus RN  Outcome: Ongoing  4/19/2019 0321 by Dave Kraus RN  Outcome: Ongoing  4/19/2019 0320 by Dave Kraus RN  Outcome: Ongoing  Goal: Ability to maintain a balanced intake and output will improve  Description  Ability to maintain a balanced intake and output will improve  4/19/2019 1550 by Gabriela José RN  Outcome: Ongoing  4/19/2019 0322 by Xena Miller RN  Outcome: Ongoing  4/19/2019 0321 by Xena Miller RN  Outcome: Ongoing  4/19/2019 0320 by Xena Miller RN  Outcome: Ongoing     Problem: Respiratory:  Goal: Respiratory status will improve  Description  Respiratory status will improve  4/19/2019 1550 by Gabriela José RN  Outcome: Ongoing  4/19/2019 0322 by Xena Miller RN  Outcome: Ongoing  4/19/2019 0321 by Xena Miller RN  Outcome: Ongoing  4/19/2019 0320 by Xena Miller RN  Outcome: Ongoing

## 2019-04-19 NOTE — PROGRESS NOTES
8000ml of fluid removed  Spoke to patients CHANG daly and advised her the amount of fluid removed and that patient was returning to the floor.

## 2019-04-19 NOTE — PLAN OF CARE
Problem: Falls - Risk of:  Goal: Will remain free from falls  Description  Will remain free from falls  4/19/2019 0322 by Eloisa Woods RN  Outcome: Ongoing  4/19/2019 0321 by Eloisa Woods RN  Outcome: Ongoing  Note:   Assess risk factors for falls  Assess fall prevention measures  Assist ambulation and toileting    Goal: Absence of physical injury  Description  Absence of physical injury  Outcome: Ongoing     Problem: Pain:  Goal: Pain level will decrease  Description  Pain level will decrease  4/19/2019 0322 by Eloisa Woods RN  Outcome: Ongoing    Goal: Control of acute pain  Description  Control of acute pain  Outcome: Ongoing  Goal: Control of chronic pain  Description  Control of chronic pain  Outcome: Ongoing     Problem: Fluid Volume:  Goal: Hemodynamic stability will improve  Description  Hemodynamic stability will improve  4/19/2019 0322 by Eloisa Woods RN  Outcome: Ongoing  Goal: Ability to maintain a balanced intake and output will improve  Description  Ability to maintain a balanced intake and output will improve  4/19/2019 0322 by Eloisa Woods RN  Outcome: Ongoing     Problem: Respiratory:  Goal: Respiratory status will improve  Description  Respiratory status will improve  4/19/2019 0322 by Eloisa Woods RN  Outcome: Ongoing     Problem: Respiratory:  Intervention: Provide positioning for optimal respiratory function and comfort  Note:   Ongoing   The care plan and education has been reviewed and mutually agreed upon with the patient.

## 2019-04-19 NOTE — CONSULTS
Gastroenterology Consult Note      Patient: Dariel Holguin  : 1955  CSN#:      Date:  2019    Subjective:       History of Present Illness  Patient is a 61 y.o.  male admitted with Anasarca [R60.1]  Anasarca [R60.1] who is seen in consult for ascites. Pt diagnosed with ETOH cirrhosis in 2018. Pt did not f/u and stopped diuretics due to lack of insurance. Abd girth has been increasing over several months with a 75# wt gain and scrotal edema. No abd pain other than from distention. No fevers. Pt denies any ETOH since diagnosis. Liver workup was otherwise unremarkable. Hep C Ab + but RNA neg. No melena      History reviewed. No pertinent past medical history. History reviewed. No pertinent surgical history. Admission Meds  No current facility-administered medications on file prior to encounter. No current outpatient medications on file prior to encounter. Patient denies ASA, NSAID use. Allergies  No Known Allergies   Social   Social History     Tobacco Use    Smoking status: Current Every Day Smoker     Packs/day: 0.25    Smokeless tobacco: Never Used   Substance Use Topics    Alcohol use: Not Currently     Frequency: Never        Family History   Problem Relation Age of Onset    High Blood Pressure Mother       No family history of colon cancer, Crohn's disease, or ulcerative colitis. Review of Systems  Pertinent items are noted in HPI. Physical Exam  Blood pressure 132/78, pulse 87, temperature 97.6 °F (36.4 °C), temperature source Temporal, resp. rate 14, height 6' 2\" (1.88 m), weight 265 lb (120.2 kg), SpO2 95 %. General appearance: alert, cooperative, no distress, appears stated age  Eyes: Anicteric  Head: Normocephalic, without obvious abnormality  Lungs: clear to auscultation bilaterally, decr BS.  Normal Effort  Heart: regular rate and rhythm, normal S1 and S2, no murmurs or rubs  Abdomen: tense abd with umbilical

## 2019-04-19 NOTE — PROGRESS NOTES
Call played to IR, spoke to interventional radiologist, he is to come in and have nursing staff look into procedure. Informed patient.

## 2019-04-19 NOTE — PROGRESS NOTES
Hospitalist Progress Note      PCP: No primary care provider on file. Date of Admission: 4/18/2019      Chief Complaint   Patient presents with    Leg Swelling     Pt reports edema in several places, left arm, legs, abdomen x 2 weeks. Pt does not have a doctor. States today he just couldn't take it anymore. Hospital Course:   Denisha Lancaster is a 61 y.o. male with PMx of anasarca and alcoholic cirrhosis of liver with ascites, who presented to [facility]  due to peripheral edema in the lower extremities, penis, scrotum and abdomen that has significantly increased over the past few months. Hep C Ab + but RNA neg. Subjective:   Pt seen awake lying in bed. Pt reports he is having significant scrotal pain due to swelling. He received 5mg of oxycodone this morning but did not experience any relief. No acute events overnight. Pt resting well, diet ok. Denies chest pain, shortness of breath. Labs reviewed. Ancillary notes reviewed. Medications:  Reviewed    Infusion Medications   Scheduled Medications    furosemide  40 mg Oral Daily    spironolactone  100 mg Oral Daily    sodium chloride flush  10 mL Intravenous 2 times per day    nicotine  1 patch Transdermal Daily     PRN Meds: oxyCODONE, sodium chloride flush, magnesium hydroxide, ondansetron      Intake/Output Summary (Last 24 hours) at 4/19/2019 1144  Last data filed at 4/19/2019 0559  Gross per 24 hour   Intake 720 ml   Output 2275 ml   Net -1555 ml       Physical Exam Performed:    /78   Pulse 87   Temp 97.6 °F (36.4 °C) (Temporal)   Resp 14   Ht 6' 2\" (1.88 m)   Wt 265 lb (120.2 kg)   SpO2 95%   BMI 34.02 kg/m²     General appearance: No apparent distress, appears stated age and cooperative. HEENT: Pupils equal, round, and reactive to light. Conjunctivae/corneas clear. Sclera Icterus is present. Neck: Supple, with full range of motion. No jugular venous distention. Trachea midline.   Respiratory:  Normal respiratory effort. Clear to auscultation, bilaterally without Rales/Wheezes/Rhonchi. Cardiovascular: Regular rate and rhythm with normal S1/S2 without murmurs, rubs or gallops. Abdomen: Tense abdomen with umbilical hernia and mild diffuse tenderness. Normal bowel sounds, no masses or organomegaly. Musculoskeletal: No clubbing, cyanosis or edema bilaterally. Full range of motion without deformity. Skin: Skin color, texture, turgor normal.  No rashes or lesions. 3+ peripheral edema. Neurologic:  Neurovascularly intact without any focal sensory/motor deficits. Cranial nerves: II-XII intact, grossly non-focal.  Psychiatric: Alert and oriented, thought content appropriate, normal insight  Capillary Refill: Brisk,< 3 seconds   Peripheral Pulses: +2 palpable, equal bilaterally     Labs:   Recent Labs     04/18/19  1056 04/19/19  0649   WBC 4.8 6.1   HGB 11.7* 10.9*   HCT 36.1* 32.8*   PLT 66* 70*     Recent Labs     04/18/19  1056 04/19/19  0649    137   K 3.8 3.1*    105   CO2 23 27   BUN 7 8   CREATININE <0.5* 0.7*   CALCIUM 7.7* 7.6*   PHOS  --  2.6     Recent Labs     04/18/19  1056   AST 33   ALT 15   BILITOT 5.1*   ALKPHOS 179*     Recent Labs     04/18/19  1056   INR 1.93*     Recent Labs     04/18/19  1056   TROPONINI <0.01     Urinalysis:      Lab Results   Component Value Date    NITRU Negative 04/18/2019    WBCUA 16 04/18/2019    RBCUA 39 04/18/2019    BLOODU MODERATE 04/18/2019    SPECGRAV 1.008 04/18/2019    GLUCOSEU Negative 04/18/2019       Radiology:  XR CHEST STANDARD (2 VW)   Final Result   Large left pleural effusion with likely underlying airspace disease   reflecting atelectasis or infiltrate. Other underlying pathology not   excluded. Recommend follow-up to resolution. Mild right basilar likely scarring or atelectasis. Small right pleural   effusion not entirely excluded.              Assessment/Plan:    Active Hospital Problems    Diagnosis Date Noted    Anasarca [R60.1] 04/18/2019       1. Hypervolemia secondary to liver cirrhosis   -Lasix 40mg and aldactone 100mg daily   -Paracentesis with fluid for cell count, cx, albumin, protein, and cytology   -2gram low sodium diet, consult with dietitian for pt education   -Strict I/Os and daily weights  -Repeat BMP in am  -Consult GI for further evaluation and management      2. Pleural Effusion   -CXR reveled large left sided pleural effusion   -Pt not complaining of shortness of breath or chest pain.  -Consider thoracentesis if effusion does not resolve with treatment of ascites or pt becomes symptomatic.       3. Chronic Macrocytic Anemia with thrombocytopenia   -Likely due to liver cirrhosis, repeat CBC in am and monitor  -Hgb 10.9, Hct 32.8, Plt 70      4. Tobacco abuse   -advised smoking cessation and offered nicotine patch      5. Hx of alcohol abuse   -Pt denies any alcohol consumption since August of 2018  -Advised patient to continue to refrain from any alcohol use       DVT Prophylaxis: Lovenox   Diet: DIET LOW SODIUM 2 GM;  Code Status: Full Code      Patient case seen and discussed under supervision of preceptor, Dr. Patrick Perez MD.   Note made by PA-ELIZABETH student in the status of a scribe, with review by preceptor. Kasandra Wynn     Addendum to PA student H& P note:  Pt seen,examined and evaluated with PA student , who acted as my scribe for the above documentation. . I have reviewed the current history, physical findings, labs and assessment and plan and agree with note as documented    s/p Paracentesis with 8L removal os ascites. Johanna Landeros MD  Hospitalist Physician     The note was completed using EMR. Every effort was made to ensure accuracy; However, inadvertent computerized transcription errors may be present. Thank you No primary care provider on file. for the opportunity to be involved in this patient's care. If you have any questions or concerns please feel free to contact me at [phone].

## 2019-04-19 NOTE — PROGRESS NOTES
0220:  Attempted to insert feng catheter to monitor I&O but patient refuses stating that he does not need it. Also c/o generalized pain and requesting something for pain. Hospitalist notified via perfect serve. 0250:  No orders received for pain control.   Patient has a hx of controlled substance abuse and he can't have Tylenol per hospitalist.

## 2019-04-19 NOTE — PROGRESS NOTES
8:01 AM: Morning assessment completed, patient denies needs at this time, call light in reach, will continue to monitor. The care plan and education has been reviewed and mutually agreed upon with the patient. Educated patient on the potential for falls during their hospital stay. Reviewed current fall safety protocol. Reviewed indication for use of bed alarm. Patient verbalized understanding. Generalized swelling, message sent to dr lr for pain medication, awaiting response.

## 2019-04-19 NOTE — PROGRESS NOTES
Shift assessment complete, see flowsheet. Patient in bed, no s/s of distress, respirations even and unlabored, VSS, edema noted to all extremities, abdomen and scrotal area, c/o generalized pain, hospitalist notified and one time order of Toradol administered at this time, voiding per urinal.  The care plan and education has been reviewed and mutually agreed upon with the patient. All needs attended. Fall precautions in place, call light within reach. Will continue to monitor.

## 2019-04-20 ENCOUNTER — APPOINTMENT (OUTPATIENT)
Dept: GENERAL RADIOLOGY | Age: 64
DRG: 280 | End: 2019-04-20
Payer: MEDICAID

## 2019-04-20 LAB
ALBUMIN SERPL-MCNC: 1.9 G/DL (ref 3.4–5)
ALP BLD-CCNC: 195 U/L (ref 40–129)
ALT SERPL-CCNC: 15 U/L (ref 10–40)
ANION GAP SERPL CALCULATED.3IONS-SCNC: 7 MMOL/L (ref 3–16)
AST SERPL-CCNC: 26 U/L (ref 15–37)
BILIRUB SERPL-MCNC: 4.3 MG/DL (ref 0–1)
BILIRUBIN DIRECT: 2 MG/DL (ref 0–0.3)
BILIRUBIN, INDIRECT: 2.3 MG/DL (ref 0–1)
BUN BLDV-MCNC: 8 MG/DL (ref 7–20)
CALCIUM SERPL-MCNC: 7.8 MG/DL (ref 8.3–10.6)
CHLORIDE BLD-SCNC: 102 MMOL/L (ref 99–110)
CO2: 25 MMOL/L (ref 21–32)
CREAT SERPL-MCNC: 0.6 MG/DL (ref 0.8–1.3)
GFR AFRICAN AMERICAN: >60
GFR NON-AFRICAN AMERICAN: >60
GLUCOSE BLD-MCNC: 98 MG/DL (ref 70–99)
HCT VFR BLD CALC: 36.6 % (ref 40.5–52.5)
HEMOGLOBIN: 12.3 G/DL (ref 13.5–17.5)
INR BLD: 1.87 (ref 0.86–1.14)
MAGNESIUM: 1.7 MG/DL (ref 1.8–2.4)
MCH RBC QN AUTO: 35.3 PG (ref 26–34)
MCHC RBC AUTO-ENTMCNC: 33.5 G/DL (ref 31–36)
MCV RBC AUTO: 105.2 FL (ref 80–100)
ORGANISM: ABNORMAL
ORGANISM: ABNORMAL
PDW BLD-RTO: 16.4 % (ref 12.4–15.4)
PHOSPHORUS: 2.3 MG/DL (ref 2.5–4.9)
PLATELET # BLD: 73 K/UL (ref 135–450)
PMV BLD AUTO: 8.3 FL (ref 5–10.5)
POTASSIUM SERPL-SCNC: 3.8 MMOL/L (ref 3.5–5.1)
PROTHROMBIN TIME: 21.3 SEC (ref 9.8–13)
RBC # BLD: 3.48 M/UL (ref 4.2–5.9)
SODIUM BLD-SCNC: 134 MMOL/L (ref 136–145)
TOTAL PROTEIN: 7 G/DL (ref 6.4–8.2)
URINE CULTURE, ROUTINE: ABNORMAL
WBC # BLD: 7.8 K/UL (ref 4–11)

## 2019-04-20 PROCEDURE — 6370000000 HC RX 637 (ALT 250 FOR IP): Performed by: INTERNAL MEDICINE

## 2019-04-20 PROCEDURE — 71046 X-RAY EXAM CHEST 2 VIEWS: CPT

## 2019-04-20 PROCEDURE — 83735 ASSAY OF MAGNESIUM: CPT

## 2019-04-20 PROCEDURE — 85610 PROTHROMBIN TIME: CPT

## 2019-04-20 PROCEDURE — 0W9G3ZZ DRAINAGE OF PERITONEAL CAVITY, PERCUTANEOUS APPROACH: ICD-10-PCS | Performed by: RADIOLOGY

## 2019-04-20 PROCEDURE — 84100 ASSAY OF PHOSPHORUS: CPT

## 2019-04-20 PROCEDURE — 2580000003 HC RX 258: Performed by: HOSPITALIST

## 2019-04-20 PROCEDURE — 1200000000 HC SEMI PRIVATE

## 2019-04-20 PROCEDURE — 80076 HEPATIC FUNCTION PANEL: CPT

## 2019-04-20 PROCEDURE — 80048 BASIC METABOLIC PNL TOTAL CA: CPT

## 2019-04-20 PROCEDURE — 6360000002 HC RX W HCPCS: Performed by: HOSPITALIST

## 2019-04-20 PROCEDURE — 87205 SMEAR GRAM STAIN: CPT

## 2019-04-20 PROCEDURE — 36415 COLL VENOUS BLD VENIPUNCTURE: CPT

## 2019-04-20 PROCEDURE — 87070 CULTURE OTHR SPECIMN AEROBIC: CPT

## 2019-04-20 PROCEDURE — 85027 COMPLETE CBC AUTOMATED: CPT

## 2019-04-20 PROCEDURE — 89051 BODY FLUID CELL COUNT: CPT

## 2019-04-20 PROCEDURE — 6370000000 HC RX 637 (ALT 250 FOR IP): Performed by: HOSPITALIST

## 2019-04-20 RX ORDER — OXYCODONE HYDROCHLORIDE 5 MG/1
10 TABLET ORAL EVERY 4 HOURS PRN
Status: DISCONTINUED | OUTPATIENT
Start: 2019-04-20 | End: 2019-04-23 | Stop reason: HOSPADM

## 2019-04-20 RX ORDER — FUROSEMIDE 10 MG/ML
80 INJECTION INTRAMUSCULAR; INTRAVENOUS 2 TIMES DAILY
Status: DISCONTINUED | OUTPATIENT
Start: 2019-04-20 | End: 2019-04-23 | Stop reason: HOSPADM

## 2019-04-20 RX ADMIN — MORPHINE SULFATE 2 MG: 2 INJECTION, SOLUTION INTRAMUSCULAR; INTRAVENOUS at 03:28

## 2019-04-20 RX ADMIN — Medication 10 ML: at 20:30

## 2019-04-20 RX ADMIN — MORPHINE SULFATE 2 MG: 2 INJECTION, SOLUTION INTRAMUSCULAR; INTRAVENOUS at 17:14

## 2019-04-20 RX ADMIN — Medication 10 ML: at 17:15

## 2019-04-20 RX ADMIN — FUROSEMIDE 40 MG: 40 TABLET ORAL at 08:34

## 2019-04-20 RX ADMIN — OXYCODONE HYDROCHLORIDE 5 MG: 5 TABLET ORAL at 02:17

## 2019-04-20 RX ADMIN — MORPHINE SULFATE 2 MG: 2 INJECTION, SOLUTION INTRAMUSCULAR; INTRAVENOUS at 20:29

## 2019-04-20 RX ADMIN — OXYCODONE HYDROCHLORIDE 5 MG: 5 TABLET ORAL at 08:34

## 2019-04-20 RX ADMIN — FUROSEMIDE 80 MG: 10 INJECTION, SOLUTION INTRAMUSCULAR; INTRAVENOUS at 17:15

## 2019-04-20 RX ADMIN — MORPHINE SULFATE 2 MG: 2 INJECTION, SOLUTION INTRAMUSCULAR; INTRAVENOUS at 12:31

## 2019-04-20 RX ADMIN — MORPHINE SULFATE 2 MG: 2 INJECTION, SOLUTION INTRAMUSCULAR; INTRAVENOUS at 09:59

## 2019-04-20 RX ADMIN — SPIRONOLACTONE 100 MG: 25 TABLET ORAL at 08:33

## 2019-04-20 RX ADMIN — Medication 10 ML: at 08:34

## 2019-04-20 RX ADMIN — OXYCODONE HYDROCHLORIDE 10 MG: 5 TABLET ORAL at 13:57

## 2019-04-20 RX ADMIN — Medication 10 ML: at 10:02

## 2019-04-20 RX ADMIN — MORPHINE SULFATE 2 MG: 2 INJECTION, SOLUTION INTRAMUSCULAR; INTRAVENOUS at 15:01

## 2019-04-20 RX ADMIN — Medication 10 ML: at 12:31

## 2019-04-20 RX ADMIN — OXYCODONE HYDROCHLORIDE 10 MG: 5 TABLET ORAL at 23:25

## 2019-04-20 RX ADMIN — OXYCODONE HYDROCHLORIDE 10 MG: 5 TABLET ORAL at 18:27

## 2019-04-20 RX ADMIN — Medication 10 ML: at 15:02

## 2019-04-20 ASSESSMENT — PAIN DESCRIPTION - LOCATION
LOCATION: GENERALIZED

## 2019-04-20 ASSESSMENT — PAIN SCALES - GENERAL
PAINLEVEL_OUTOF10: 10
PAINLEVEL_OUTOF10: 7
PAINLEVEL_OUTOF10: 10
PAINLEVEL_OUTOF10: 7
PAINLEVEL_OUTOF10: 10
PAINLEVEL_OUTOF10: 9
PAINLEVEL_OUTOF10: 10
PAINLEVEL_OUTOF10: 7
PAINLEVEL_OUTOF10: 10
PAINLEVEL_OUTOF10: 10
PAINLEVEL_OUTOF10: 7
PAINLEVEL_OUTOF10: 5
PAINLEVEL_OUTOF10: 0
PAINLEVEL_OUTOF10: 7
PAINLEVEL_OUTOF10: 10
PAINLEVEL_OUTOF10: 10

## 2019-04-20 ASSESSMENT — PAIN DESCRIPTION - PAIN TYPE
TYPE: ACUTE PAIN

## 2019-04-20 ASSESSMENT — PAIN DESCRIPTION - DESCRIPTORS
DESCRIPTORS: ACHING;THROBBING

## 2019-04-20 NOTE — PROGRESS NOTES
[facility]    4/20/2019 9:37 AM        Name: Latonia Martin . Admitted: 4/18/2019  Primary Care Provider: No primary care provider on file. (Tel: None)                        Hospital Course:   Latonia Martin is D 63 y. o. male with PMx of anasarca and alcoholic cirrhosis of liver with ascites, who presented to [facility]  due to peripheral edema in the lower extremities, penis, scrotum and abdomen that has significantly increased over the past few months. Hep C Ab + but RNA neg. Subjective:  . No acute events overnight. Resting well. Pain control. Diet ok. Labs reviewed  Denies any chest pain sob.      Reviewed interval ancillary notes    Current Medications    furosemide (LASIX) tablet 40 mg Daily   spironolactone (ALDACTONE) tablet 100 mg Daily   oxyCODONE (ROXICODONE) immediate release tablet 5 mg Q4H PRN   morphine (PF) injection 2 mg Q2H PRN   potassium chloride (KLOR-CON M) extended release tablet 40 mEq PRN   Or    potassium bicarb-citric acid (EFFER-K) effervescent tablet 40 mEq PRN   Or    potassium chloride 10 mEq/100 mL IVPB (Peripheral Line) PRN   sodium chloride flush 0.9 % injection 10 mL 2 times per day   sodium chloride flush 0.9 % injection 10 mL PRN   magnesium hydroxide (MILK OF MAGNESIA) 400 MG/5ML suspension 30 mL Daily PRN   ondansetron (ZOFRAN) injection 4 mg Q6H PRN   nicotine (NICODERM CQ) 14 MG/24HR 1 patch Daily       Objective:  /67   Pulse 89   Temp 98.5 °F (36.9 °C) (Oral)   Resp 16   Ht 6' 2\" (1.88 m)   Wt 250 lb (113.4 kg)   SpO2 96%   BMI 32.10 kg/m²     Intake/Output Summary (Last 24 hours) at 4/20/2019 0937  Last data filed at 4/20/2019 0833  Gross per 24 hour   Intake 1000 ml   Output 1350 ml   Net -350 ml    Wt Readings from Last 3 Encounters:   04/20/19 250 lb (113.4 kg)   08/06/18 240 lb 8 oz (109.1 kg)       General appearance:  Appears comfortable  Eyes: Sclera clear. Pupils equal.  ENT: Moist oral mucosa. Trachea midline, no adenopathy. Cardiovascular: Regular rhythm, normal S1, S2. No murmur. No edema in lower extremities  Respiratory: Not using accessory muscles. Good inspiratory effort. Clear to auscultation bilaterally, no wheeze or crackles. GI: Abdomen soft, no tenderness, not distended, normal bowel sounds  Musculoskeletal: No cyanosis in digits, neck supple  Neurology: CN 2-12 grossly intact. No speech or motor deficits  Psych: Normal affect. Alert and oriented in time, place and person  Skin: Warm, dry, normal turgor    Labs and Tests:  CBC:   Recent Labs     04/18/19  1056 04/19/19  0649 04/20/19  0707   WBC 4.8 6.1 7.8   HGB 11.7* 10.9* 12.3*   PLT 66* 70* 73*     BMP:  Recent Labs     04/18/19  1056 04/19/19  0649 04/20/19  0706    137 134*   K 3.8 3.1* 3.8    105 102   CO2 23 27 25   BUN 7 8 8   CREATININE <0.5* 0.7* 0.6*   GLUCOSE 140* 118* 98     Hepatic: Recent Labs     04/18/19  1056 04/20/19  0706   AST 33 26   ALT 15 15   BILITOT 5.1* 4.3*   ALKPHOS 179* 195*         Problem List  Active Problems:    Anasarca  Resolved Problems:    * No resolved hospital problems. *       Assessment & Plan:      1. Hypervolemia secondary to liver cirrhosis . 8 L of fluid removal yesterday. We will resume IV Lasix 80 mg b.i.d. again since patient still complained about very protuberant abdominal area and abdominal pain, continued low sodium diet, consult with dietitian for pt education  -Strict I/Os and daily weights, GI following    2. Pleural Effusion  -CXR reveled large left sided pleural effusion, Pt not complaining of shortness of breath or chest pain.  -Consider thoracentesis if effusion does not resolve with treatment of ascites, will check CXR in AM.    3.Chronic Macrocytic Anemia with thrombocytopenia Likely due to liver cirrhosis,monitor   4.  Tobacco abuse  -advised smoking cessation and offered nicotine patch   5.  Hx of alcohol abuse   -Pt denies any alcohol consumption since August of 2018, -Advised patient to continue to refrain from any alcohol use        Diet: DIET LOW SODIUM 2 GM;  Code:Full Code  DVT PPX bhanu Lara MD   4/20/2019 9:37 AM

## 2019-04-20 NOTE — PROGRESS NOTES
paracentesis, but ascites fluid discarded and not sent for chemistries. He feels abd distended and uncomfortable again, already. PLAN  :  1) Low Na diet. 2) Continue diuresis. 3) Plan repeat paracentesis Monday with fluid analysis. 4) Will need EGD to screen for varices and will need outpatient f/u of cirrhosis/and primary care MD.  Will ask social work to help arrange.     Petty Mckeon MD  [address] [place] [address] [facility]  4/20/2019

## 2019-04-20 NOTE — PROGRESS NOTES
Shift assessment completed and documented at this time. Pt sitting up in bed this morning, resps easy and unlabored. Pt reports having pain \"all over\" at this time. Rates it as a 10 on pain scale. Still with some generalized swelling noted. Medicate with Oxycodone tablet as per MAR. The care plan and education has been reviewed and mutually agreed upon with the patient. Call light in reach. Will continue to monitor.

## 2019-04-20 NOTE — PLAN OF CARE
Patient remains free from falls or accidental injury. Room free from clutter. All fall precautions in place. Bed in lowest position with wheels locked and alarm on, call light and belongings within reach, and door open. Pain level will decrease with the administration of PRN pain medication when needed. Hemodynamic stability will continue to improve. Patient's respiratory status will continue to improve.

## 2019-04-20 NOTE — PLAN OF CARE
Problem: Falls - Risk of:  Goal: Will remain free from falls  Description  Will remain free from falls  4/20/2019 0400 by Pako Pardo RN  Outcome: Ongoing  4/19/2019 1550 by Itz Neal RN  Outcome: Ongoing  Goal: Absence of physical injury  Description  Absence of physical injury  4/20/2019 0400 by Pako Pardo RN  Outcome: Ongoing  4/19/2019 1550 by Itz Neal RN  Outcome: Ongoing     Problem: Pain:  Goal: Pain level will decrease  Description  Pain level will decrease  4/20/2019 0400 by Pako Pardo RN  Outcome: Ongoing  4/19/2019 1550 by Itz Neal RN  Outcome: Ongoing  Goal: Control of acute pain  Description  Control of acute pain  4/20/2019 0400 by Pako Pardo RN  Outcome: Ongoing  4/19/2019 1550 by Itz Neal RN  Outcome: Ongoing  Goal: Control of chronic pain  Description  Control of chronic pain  4/20/2019 0400 by Pako Pardo RN  Outcome: Ongoing  4/19/2019 1550 by Itz Neal RN  Outcome: Ongoing     Problem: Fluid Volume:  Goal: Hemodynamic stability will improve  Description  Hemodynamic stability will improve  4/20/2019 0400 by Pako Pardo RN  Outcome: Ongoing  4/19/2019 1550 by Itz Neal RN  Outcome: Ongoing  Goal: Ability to maintain a balanced intake and output will improve  Description  Ability to maintain a balanced intake and output will improve  4/20/2019 0400 by Pako Pardo RN  Outcome: Ongoing  4/19/2019 1550 by Itz Neal RN  Outcome: Ongoing     Problem: Respiratory:  Goal: Respiratory status will improve  Description  Respiratory status will improve  4/20/2019 0400 by Pako Pardo RN  Outcome: Ongoing  4/19/2019 1550 by Itz Neal RN  Outcome: Ongoing

## 2019-04-21 PROCEDURE — 6360000002 HC RX W HCPCS: Performed by: HOSPITALIST

## 2019-04-21 PROCEDURE — 0W9B3ZZ DRAINAGE OF LEFT PLEURAL CAVITY, PERCUTANEOUS APPROACH: ICD-10-PCS | Performed by: RADIOLOGY

## 2019-04-21 PROCEDURE — 2580000003 HC RX 258: Performed by: HOSPITALIST

## 2019-04-21 PROCEDURE — 6360000002 HC RX W HCPCS: Performed by: INTERNAL MEDICINE

## 2019-04-21 PROCEDURE — 1200000000 HC SEMI PRIVATE

## 2019-04-21 PROCEDURE — 6370000000 HC RX 637 (ALT 250 FOR IP): Performed by: HOSPITALIST

## 2019-04-21 PROCEDURE — 6370000000 HC RX 637 (ALT 250 FOR IP): Performed by: INTERNAL MEDICINE

## 2019-04-21 RX ORDER — MORPHINE SULFATE 4 MG/ML
3 INJECTION, SOLUTION INTRAMUSCULAR; INTRAVENOUS
Status: DISCONTINUED | OUTPATIENT
Start: 2019-04-21 | End: 2019-04-23 | Stop reason: HOSPADM

## 2019-04-21 RX ADMIN — MORPHINE SULFATE 2 MG: 2 INJECTION, SOLUTION INTRAMUSCULAR; INTRAVENOUS at 00:54

## 2019-04-21 RX ADMIN — OXYCODONE HYDROCHLORIDE 10 MG: 5 TABLET ORAL at 03:28

## 2019-04-21 RX ADMIN — OXYCODONE HYDROCHLORIDE 10 MG: 5 TABLET ORAL at 17:51

## 2019-04-21 RX ADMIN — Medication 10 ML: at 17:41

## 2019-04-21 RX ADMIN — SPIRONOLACTONE 100 MG: 25 TABLET ORAL at 08:09

## 2019-04-21 RX ADMIN — Medication 10 ML: at 09:37

## 2019-04-21 RX ADMIN — Medication 10 ML: at 16:15

## 2019-04-21 RX ADMIN — Medication 10 ML: at 13:33

## 2019-04-21 RX ADMIN — MORPHINE SULFATE 2 MG: 2 INJECTION, SOLUTION INTRAMUSCULAR; INTRAVENOUS at 05:56

## 2019-04-21 RX ADMIN — MORPHINE SULFATE 3 MG: 4 INJECTION INTRAVENOUS at 13:33

## 2019-04-21 RX ADMIN — Medication 10 ML: at 08:09

## 2019-04-21 RX ADMIN — OXYCODONE HYDROCHLORIDE 10 MG: 5 TABLET ORAL at 08:09

## 2019-04-21 RX ADMIN — FUROSEMIDE 80 MG: 10 INJECTION, SOLUTION INTRAMUSCULAR; INTRAVENOUS at 17:40

## 2019-04-21 RX ADMIN — MORPHINE SULFATE 3 MG: 4 INJECTION INTRAVENOUS at 20:48

## 2019-04-21 RX ADMIN — MORPHINE SULFATE 3 MG: 4 INJECTION INTRAVENOUS at 23:04

## 2019-04-21 RX ADMIN — MORPHINE SULFATE 3 MG: 4 INJECTION INTRAVENOUS at 16:14

## 2019-04-21 RX ADMIN — FUROSEMIDE 80 MG: 10 INJECTION, SOLUTION INTRAMUSCULAR; INTRAVENOUS at 09:37

## 2019-04-21 RX ADMIN — Medication 10 ML: at 23:09

## 2019-04-21 RX ADMIN — MORPHINE SULFATE 2 MG: 2 INJECTION, SOLUTION INTRAMUSCULAR; INTRAVENOUS at 09:48

## 2019-04-21 RX ADMIN — OXYCODONE HYDROCHLORIDE 10 MG: 5 TABLET ORAL at 12:22

## 2019-04-21 ASSESSMENT — PAIN SCALES - GENERAL
PAINLEVEL_OUTOF10: 10
PAINLEVEL_OUTOF10: 7
PAINLEVEL_OUTOF10: 10
PAINLEVEL_OUTOF10: 7
PAINLEVEL_OUTOF10: 10
PAINLEVEL_OUTOF10: 7
PAINLEVEL_OUTOF10: 10
PAINLEVEL_OUTOF10: 0
PAINLEVEL_OUTOF10: 7
PAINLEVEL_OUTOF10: 7

## 2019-04-21 ASSESSMENT — PAIN DESCRIPTION - LOCATION
LOCATION: GENERALIZED

## 2019-04-21 ASSESSMENT — PAIN DESCRIPTION - DESCRIPTORS
DESCRIPTORS: ACHING;THROBBING
DESCRIPTORS: ACHING
DESCRIPTORS: ACHING;THROBBING

## 2019-04-21 ASSESSMENT — PAIN DESCRIPTION - PAIN TYPE
TYPE: ACUTE PAIN

## 2019-04-21 ASSESSMENT — PAIN DESCRIPTION - FREQUENCY: FREQUENCY: CONTINUOUS

## 2019-04-21 ASSESSMENT — PAIN DESCRIPTION - ONSET: ONSET: ON-GOING

## 2019-04-21 ASSESSMENT — PAIN DESCRIPTION - PROGRESSION: CLINICAL_PROGRESSION: NOT CHANGED

## 2019-04-21 NOTE — PLAN OF CARE
Problem: Falls - Risk of:  Goal: Will remain free from falls  Description  Will remain free from falls  4/21/2019 1930 by Angelia Gonzáles RN  Outcome: Ongoing  4/21/2019 1707 by Kirti Dixon RN  Outcome: Ongoing  Goal: Absence of physical injury  Description  Absence of physical injury  Outcome: Ongoing     Problem: Pain:  Description  Pain management should include both nonpharmacologic and pharmacologic interventions. Goal: Pain level will decrease  Description  Pain level will decrease  4/21/2019 1930 by Angelia Gonzáles RN  Outcome: Ongoing  4/21/2019 1707 by Kirti Dixon RN  Outcome: Ongoing  Goal: Control of acute pain  Description  Control of acute pain  Outcome: Ongoing  Goal: Control of chronic pain  Description  Control of chronic pain  Outcome: Ongoing     Problem: Fluid Volume:  Description  [TRUNCATED] Hyponatremia indicates a relatively greater amount of water to sodium in plasma. In hypovolemia, a deficit of both total body water and sodium exists but relatively less water deficit. Euvolemia represents near normal total body sodium co .. Angelica Kathleen [TRUNCATED] Hyponatremia indicates a relatively greater amount of water to sodium in plasma. In hypovolemia, a deficit of both total body water and sodium exists but relatively less water deficit. Euvolemia represents near normal total body sodium co ...   Goal: Hemodynamic stability will improve  Description  Hemodynamic stability will improve  4/21/2019 1930 by Angelia Gonzáles RN  Outcome: Ongoing  4/21/2019 1707 by Kirti Dixon RN  Outcome: Ongoing  Goal: Ability to maintain a balanced intake and output will improve  Description  Ability to maintain a balanced intake and output will improve  Outcome: Ongoing     Problem: Respiratory:  Goal: Respiratory status will improve  Description  Respiratory status will improve  4/21/2019 1930 by Angelia Gonzáles RN  Outcome: Ongoing  4/21/2019 1707 by Kirti Dixon RN  Outcome: Ongoing     Problem:

## 2019-04-21 NOTE — PROGRESS NOTES
Shift assessment complete, see flowsheet. Patient in bed, no s/s of distress, respirations even and unlabored, VSS, generalized swelling to abdomen and extremities, Morphine administered for pain. The care plan and education has been reviewed and mutually agreed upon with the patient. All needs attended. Fall precautions in place, call light within reach. Will continue to monitor.

## 2019-04-21 NOTE — PROGRESS NOTES
[facility] HOSPITALISTS PROGRESS NOTE    4/21/2019 10:55 AM        Name: Melodie Kelley . Admitted: 4/18/2019  Primary Care Provider: No primary care provider on file. (Tel: None)                        Hospital Course:   Melodie Kelley is H 56 y. o. male with PMx of anasarca and alcoholic cirrhosis of liver with ascites, who presented to [address] [place] [address] to peripheral edema in the lower extremities, penis, scrotum and abdomen that has significantly increased over the past few months. Hep C Ab + but RNA neg. S/p Paracentesis 8 L removed on 4/19       Subjective:  . No acute events overnight. Resting well. Pain control. Diet ok. Labs reviewed  Denies any chest pain sob.      Reviewed interval ancillary notes    Current Medications    furosemide (LASIX) injection 80 mg BID   oxyCODONE (ROXICODONE) immediate release tablet 10 mg Q4H PRN   spironolactone (ALDACTONE) tablet 100 mg Daily   morphine (PF) injection 2 mg Q2H PRN   potassium chloride (KLOR-CON M) extended release tablet 40 mEq PRN   Or    potassium bicarb-citric acid (EFFER-K) effervescent tablet 40 mEq PRN   Or    potassium chloride 10 mEq/100 mL IVPB (Peripheral Line) PRN   sodium chloride flush 0.9 % injection 10 mL 2 times per day   sodium chloride flush 0.9 % injection 10 mL PRN   magnesium hydroxide (MILK OF MAGNESIA) 400 MG/5ML suspension 30 mL Daily PRN   ondansetron (ZOFRAN) injection 4 mg Q6H PRN   nicotine (NICODERM CQ) 14 MG/24HR 1 patch Daily       Objective:  /78   Pulse 95   Temp 98.2 °F (36.8 °C) (Oral)   Resp 16   Ht 6' 2\" (1.88 m)   Wt 250 lb (113.4 kg)   SpO2 94%   BMI 32.10 kg/m²     Intake/Output Summary (Last 24 hours) at 4/21/2019 1055  Last data filed at 4/21/2019 0951  Gross per 24 hour   Intake 1465 ml   Output 2300 ml   Net -835 ml    Wt Readings from Last 3 Encounters: 04/20/19 250 lb (113.4 kg)   08/06/18 240 lb 8 oz (109.1 kg)       General appearance:  Appears comfortable  Eyes: Sclera clear. Pupils equal.  ENT: Moist oral mucosa. Trachea midline, no adenopathy. Cardiovascular: Regular rhythm, normal S1, S2. No murmur. No edema in lower extremities  Respiratory: Not using accessory muscles. Good inspiratory effort. Clear to auscultation bilaterally, no wheeze or crackles. GI: Abdomen soft, no tenderness, not distended, normal bowel sounds  Musculoskeletal: No cyanosis in digits, neck supple  Neurology: CN 2-12 grossly intact. No speech or motor deficits  Psych: Normal affect. Alert and oriented in time, place and person  Skin: Warm, dry, normal turgor    Labs and Tests:  CBC:   Recent Labs     04/18/19  1056 04/19/19  0649 04/20/19  0707   WBC 4.8 6.1 7.8   HGB 11.7* 10.9* 12.3*   PLT 66* 70* 73*     BMP:  Recent Labs     04/18/19  1056 04/19/19  0649 04/20/19  0706    137 134*   K 3.8 3.1* 3.8    105 102   CO2 23 27 25   BUN 7 8 8   CREATININE <0.5* 0.7* 0.6*   GLUCOSE 140* 118* 98     Hepatic: Recent Labs     04/18/19  1056 04/20/19  0706   AST 33 26   ALT 15 15   BILITOT 5.1* 4.3*   ALKPHOS 179* 195*         Problem List  Active Problems:    Anasarca  Resolved Problems:    * No resolved hospital problems. *       Assessment & Plan:      1. Hypervolemia secondary to liver cirrhosis . 8 L of fluid removal on 4/19, c/w IV Lasix 80 mg b.i.d. again since patient still complained about very protuberant abdominal area and abdominal pain, continued low sodium diet, consult with dietitian for pt education  -Strict I/Os and daily weights, since admission 3.9L negative balance (with ascites removed it will be 11.9L), GI following another paracentesis needed for tomorrow   2.Pleural Effusion  -CXR reveled large left sided pleural effusion, Pt not complaining of shortness of breath or chest pain. Need thoracentesis as well  -Consider thoracentesis if effusion does not

## 2019-04-21 NOTE — PROGRESS NOTES
[facility] GI  Gastroenterology Progress Note    Fco Real is a 61 y.o. male patient. Active Problems:    Anasarca  Resolved Problems:    * No resolved hospital problems. *      SUBJECTIVE:  Feels abdomen is enlarging. C/o pain from abdomen down into LE's. ROS: Denies CP, SOB    Gastrointestinal ROS: positive for - abdominal pain  negative for - change in bowel habits, constipation, diarrhea, hematemesis, melena or nausea/vomiting    Physical    VITALS:  /78   Pulse 95   Temp 98.2 °F (36.8 °C) (Oral)   Resp 16   Ht 6' 2\" (1.88 m)   Wt 250 lb (113.4 kg)   SpO2 94%   BMI 32.10 kg/m²   TEMPERATURE:  Current - Temp: 98.2 °F (36.8 °C); Max - Temp  Av.6 °F (37 °C)  Min: 98.2 °F (36.8 °C)  Max: 99.1 °F (37.3 °C)    NAD  RRR, Nl s1s2  Lungs CTA Bilaterally, normal effort  Abdomen distended but not tense, non tender, no HSM, Bowel sounds normal.    Data    Data Review:    Recent Labs     19  0649 19  0707   WBC 6.1 7.8   HGB 10.9* 12.3*   HCT 32.8* 36.6*   .4* 105.2*   PLT 70* 73*     Recent Labs     19  0649 19  0706    134*   K 3.1* 3.8    102   CO2 27 25   PHOS 2.6 2.3*   BUN 8 8   CREATININE 0.7* 0.6*     Recent Labs     19  0706   AST 26   ALT 15   BILIDIR 2.0*   BILITOT 4.3*   ALKPHOS 195*     No results for input(s): LIPASE, AMYLASE in the last 72 hours. Recent Labs     19  0707   PROTIME 21.3*   INR 1.87*     No results for input(s): PTT in the last 72 hours. Radiology Review:  CXR 19:   No interval change of infiltrate in the left lower lobe.  Stable large left   pleural effusion.  Low lung volumes.  No pneumothorax.  No free air below the   diaphragm.             ASSESSMENT :     Cirrhosis - New dx in 2018.  CT with nodular liver and large amount of ascites at that time. Likely d/t alcohol. He has significant alcohol use over 40 years but denies ETOH since dx in Aug 2018, but no f/u.  No signs of GI blood loss.  He has significant perpheral edema/anasarca. Now s/p paracentesis, but ascites fluid discarded and not sent for chemistries. He feels abd distended and uncomfortable again, already, along with anasarca. He is diuresing nicely with preservation of renal fcn. Would repeat paracentesis in AM.  He will need social work to aid him in getting health insurance (Medicaid?), appointment with [address] for primary MD and to [address] [place] [address] Liver [facility].          PLAN  :  1) Low Na diet. 2) Continue diuresis. 3) Plan repeat paracentesis Monday with fluid analysis. 4) Check AFP, Vit B12 and folate levels.   5) Will need EGD to screen for varices and will need outpatient f/u of cirrhosis at [address] [place] [address] liver [facility] and primary care MD.  Will ask social work to help arrange.     Marie Talavera MD  [address] [facility] and [facility] [place]  4/21/2019

## 2019-04-21 NOTE — PROGRESS NOTES
Shift assessment completed and documented at this time. Pt sitting up in bed, watching TV this morning. Resps easy and unlabored. C/O generalized aching, throbbing pain \"all over\" Still with swelling in all extremities. Medicated with Oxycodone as per MAR. Encouraged patient to take a shower this morning. The care plan and education has been reviewed and mutually agreed upon with the patient. Call light in reach. Will continue to monitor.

## 2019-04-22 ENCOUNTER — APPOINTMENT (OUTPATIENT)
Dept: ULTRASOUND IMAGING | Age: 64
DRG: 280 | End: 2019-04-22
Payer: MEDICAID

## 2019-04-22 ENCOUNTER — APPOINTMENT (OUTPATIENT)
Dept: GENERAL RADIOLOGY | Age: 64
DRG: 280 | End: 2019-04-22
Payer: MEDICAID

## 2019-04-22 LAB
AMYLASE FLUID: 26 U/L
ANION GAP SERPL CALCULATED.3IONS-SCNC: 8 MMOL/L (ref 3–16)
APPEARANCE FLUID: NORMAL
APPEARANCE FLUID: NORMAL
BUN BLDV-MCNC: 9 MG/DL (ref 7–20)
CALCIUM SERPL-MCNC: 7.8 MG/DL (ref 8.3–10.6)
CELL COUNT FLUID TYPE: NORMAL
CELL COUNT FLUID TYPE: NORMAL
CHLORIDE BLD-SCNC: 103 MMOL/L (ref 99–110)
CLOT EVALUATION: NORMAL
CLOT EVALUATION: NORMAL
CO2: 26 MMOL/L (ref 21–32)
COLOR FLUID: NORMAL
COLOR FLUID: YELLOW
CREAT SERPL-MCNC: 0.6 MG/DL (ref 0.8–1.3)
FLUID PATH CONSULT: YES
FLUID TYPE: NORMAL
FOLATE: 8.98 NG/ML (ref 4.78–24.2)
GFR AFRICAN AMERICAN: >60
GFR NON-AFRICAN AMERICAN: >60
GLUCOSE BLD-MCNC: 105 MG/DL (ref 70–99)
GLUCOSE, FLUID: 144 MG/DL
HCT VFR BLD CALC: 34.6 % (ref 40.5–52.5)
HEMOGLOBIN: 11.6 G/DL (ref 13.5–17.5)
LD, FLUID: 115 U/L
LYMPHOCYTES, BODY FLUID: 25 %
LYMPHOCYTES, BODY FLUID: 42 %
MACROPHAGE FLUID: 47 %
MACROPHAGE FLUID: 66 %
MAGNESIUM: 1.7 MG/DL (ref 1.8–2.4)
MCH RBC QN AUTO: 35.8 PG (ref 26–34)
MCHC RBC AUTO-ENTMCNC: 33.6 G/DL (ref 31–36)
MCV RBC AUTO: 106.4 FL (ref 80–100)
MESOTHELIAL FLUID: 2 %
MONONUCLEAR UNIDENTIFIED CELLS FLUID: 1 %
NEUTROPHIL, FLUID: 8 %
NEUTROPHIL, FLUID: 9 %
NUCLEATED CELLS FLUID: 635 /CUMM
NUCLEATED CELLS FLUID: 91 /CUMM
NUMBER OF CELLS COUNTED FLUID: 100
NUMBER OF CELLS COUNTED FLUID: 100
PDW BLD-RTO: 16.3 % (ref 12.4–15.4)
PHOSPHORUS: 3 MG/DL (ref 2.5–4.9)
PLATELET # BLD: 80 K/UL (ref 135–450)
PMV BLD AUTO: 9.3 FL (ref 5–10.5)
POTASSIUM SERPL-SCNC: 3.7 MMOL/L (ref 3.5–5.1)
PROTEIN FLUID: 0.8 G/DL
PROTEIN FLUID: 2.4 G/DL
RBC # BLD: 3.25 M/UL (ref 4.2–5.9)
RBC FLUID: <1000 /CUMM
RBC FLUID: NORMAL /CUMM
SODIUM BLD-SCNC: 137 MMOL/L (ref 136–145)
VITAMIN B-12: 1276 PG/ML (ref 211–911)
WBC # BLD: 7.1 K/UL (ref 4–11)

## 2019-04-22 PROCEDURE — 82746 ASSAY OF FOLIC ACID SERUM: CPT

## 2019-04-22 PROCEDURE — 83615 LACTATE (LD) (LDH) ENZYME: CPT

## 2019-04-22 PROCEDURE — 89051 BODY FLUID CELL COUNT: CPT

## 2019-04-22 PROCEDURE — 6360000002 HC RX W HCPCS: Performed by: HOSPITALIST

## 2019-04-22 PROCEDURE — 2580000003 HC RX 258: Performed by: HOSPITALIST

## 2019-04-22 PROCEDURE — 84100 ASSAY OF PHOSPHORUS: CPT

## 2019-04-22 PROCEDURE — 2709999900 US THORACENTESIS

## 2019-04-22 PROCEDURE — 82042 OTHER SOURCE ALBUMIN QUAN EA: CPT

## 2019-04-22 PROCEDURE — 83735 ASSAY OF MAGNESIUM: CPT

## 2019-04-22 PROCEDURE — 71045 X-RAY EXAM CHEST 1 VIEW: CPT

## 2019-04-22 PROCEDURE — 0W9G3ZZ DRAINAGE OF PERITONEAL CAVITY, PERCUTANEOUS APPROACH: ICD-10-PCS | Performed by: RADIOLOGY

## 2019-04-22 PROCEDURE — 36415 COLL VENOUS BLD VENIPUNCTURE: CPT

## 2019-04-22 PROCEDURE — 82150 ASSAY OF AMYLASE: CPT

## 2019-04-22 PROCEDURE — 88305 TISSUE EXAM BY PATHOLOGIST: CPT

## 2019-04-22 PROCEDURE — 84157 ASSAY OF PROTEIN OTHER: CPT

## 2019-04-22 PROCEDURE — 82105 ALPHA-FETOPROTEIN SERUM: CPT

## 2019-04-22 PROCEDURE — 85027 COMPLETE CBC AUTOMATED: CPT

## 2019-04-22 PROCEDURE — 2709999900 US GUIDED PARACENTESIS

## 2019-04-22 PROCEDURE — 82945 GLUCOSE OTHER FLUID: CPT

## 2019-04-22 PROCEDURE — 80048 BASIC METABOLIC PNL TOTAL CA: CPT

## 2019-04-22 PROCEDURE — 6360000002 HC RX W HCPCS: Performed by: INTERNAL MEDICINE

## 2019-04-22 PROCEDURE — 82607 VITAMIN B-12: CPT

## 2019-04-22 PROCEDURE — 1200000000 HC SEMI PRIVATE

## 2019-04-22 PROCEDURE — 88112 CYTOPATH CELL ENHANCE TECH: CPT

## 2019-04-22 PROCEDURE — 6370000000 HC RX 637 (ALT 250 FOR IP): Performed by: HOSPITALIST

## 2019-04-22 PROCEDURE — 6370000000 HC RX 637 (ALT 250 FOR IP): Performed by: INTERNAL MEDICINE

## 2019-04-22 RX ORDER — LIDOCAINE HYDROCHLORIDE 10 MG/ML
5 INJECTION, SOLUTION EPIDURAL; INFILTRATION; INTRACAUDAL; PERINEURAL ONCE
Status: DISCONTINUED | OUTPATIENT
Start: 2019-04-22 | End: 2019-04-22 | Stop reason: SDUPTHER

## 2019-04-22 RX ORDER — LIDOCAINE HYDROCHLORIDE 10 MG/ML
5 INJECTION, SOLUTION EPIDURAL; INFILTRATION; INTRACAUDAL; PERINEURAL ONCE
Status: CANCELLED | OUTPATIENT
Start: 2019-04-22

## 2019-04-22 RX ORDER — LIDOCAINE HYDROCHLORIDE 10 MG/ML
5 INJECTION, SOLUTION EPIDURAL; INFILTRATION; INTRACAUDAL; PERINEURAL ONCE
Status: DISCONTINUED | OUTPATIENT
Start: 2019-04-22 | End: 2019-04-23 | Stop reason: HOSPADM

## 2019-04-22 RX ADMIN — FUROSEMIDE 80 MG: 10 INJECTION, SOLUTION INTRAMUSCULAR; INTRAVENOUS at 18:12

## 2019-04-22 RX ADMIN — OXYCODONE HYDROCHLORIDE 10 MG: 5 TABLET ORAL at 07:00

## 2019-04-22 RX ADMIN — Medication 10 ML: at 21:20

## 2019-04-22 RX ADMIN — OXYCODONE HYDROCHLORIDE 10 MG: 5 TABLET ORAL at 16:19

## 2019-04-22 RX ADMIN — FUROSEMIDE 80 MG: 10 INJECTION, SOLUTION INTRAMUSCULAR; INTRAVENOUS at 08:05

## 2019-04-22 RX ADMIN — MORPHINE SULFATE 3 MG: 4 INJECTION INTRAVENOUS at 08:05

## 2019-04-22 RX ADMIN — MORPHINE SULFATE 3 MG: 4 INJECTION INTRAVENOUS at 14:40

## 2019-04-22 RX ADMIN — OXYCODONE HYDROCHLORIDE 10 MG: 5 TABLET ORAL at 01:08

## 2019-04-22 RX ADMIN — SPIRONOLACTONE 100 MG: 25 TABLET ORAL at 08:06

## 2019-04-22 RX ADMIN — MORPHINE SULFATE 3 MG: 4 INJECTION INTRAVENOUS at 18:12

## 2019-04-22 RX ADMIN — MORPHINE SULFATE 0.75 MG: 4 INJECTION INTRAVENOUS at 03:53

## 2019-04-22 RX ADMIN — Medication 10 ML: at 08:00

## 2019-04-22 RX ADMIN — MORPHINE SULFATE 3 MG: 4 INJECTION INTRAVENOUS at 21:19

## 2019-04-22 RX ADMIN — OXYCODONE HYDROCHLORIDE 10 MG: 5 TABLET ORAL at 23:07

## 2019-04-22 RX ADMIN — OXYCODONE HYDROCHLORIDE 10 MG: 5 TABLET ORAL at 12:26

## 2019-04-22 ASSESSMENT — PAIN SCALES - GENERAL
PAINLEVEL_OUTOF10: 10

## 2019-04-22 ASSESSMENT — PAIN DESCRIPTION - ONSET
ONSET: ON-GOING
ONSET: ON-GOING

## 2019-04-22 ASSESSMENT — PAIN DESCRIPTION - PROGRESSION
CLINICAL_PROGRESSION: NOT CHANGED

## 2019-04-22 ASSESSMENT — PAIN DESCRIPTION - FREQUENCY
FREQUENCY: CONTINUOUS
FREQUENCY: CONTINUOUS

## 2019-04-22 ASSESSMENT — PAIN DESCRIPTION - LOCATION
LOCATION: ABDOMEN;BACK
LOCATION: GENERALIZED
LOCATION: GENERALIZED

## 2019-04-22 ASSESSMENT — PAIN DESCRIPTION - DESCRIPTORS
DESCRIPTORS: ACHING
DESCRIPTORS: ACHING

## 2019-04-22 ASSESSMENT — PAIN DESCRIPTION - PAIN TYPE
TYPE: ACUTE PAIN

## 2019-04-22 NOTE — PROGRESS NOTES
Arrived back to room at 1220 from procedure. A/O, VSS. Dressing to left abdomen c/d/i, dressing to left back c/d/i. Pain medication provided for pain 10/10. No complaints of SOB. Needs met. Call light in reach, will monitor.

## 2019-04-22 NOTE — PROGRESS NOTES
[facility]    4/22/2019 6:40 PM        Name: Denisha Lancaster . Admitted: 4/18/2019  Primary Care Provider: No primary care provider on file. (Tel: None)                        Hospital Course:   Denisha Lancaster is Q 25 y. o. male with PMx of anasarca and alcoholic cirrhosis of liver with ascites, who presented to [address] [place] [address] to peripheral edema in the lower extremities, penis, scrotum and abdomen that has significantly increased over the past few months. Hep C Ab + but RNA neg. S/p Paracentesis 8 L removed on 4/19       Subjective:      No acute events overnight. Resting well. Pain control. Diet ok. Labs reviewed  Denies any chest pain sob. Just got abck from thoracentesis and paracentesis.   Reviewed interval ancillary notes    Current Medications    lidocaine PF 1 % injection 5 mL Once   neomycin-polymyxin-pramoxine (NEOSPORIN) cream Once   morphine injection 3 mg Q2H PRN   furosemide (LASIX) injection 80 mg BID   oxyCODONE (ROXICODONE) immediate release tablet 10 mg Q4H PRN   spironolactone (ALDACTONE) tablet 100 mg Daily   potassium chloride (KLOR-CON M) extended release tablet 40 mEq PRN   Or    potassium bicarb-citric acid (EFFER-K) effervescent tablet 40 mEq PRN   Or    potassium chloride 10 mEq/100 mL IVPB (Peripheral Line) PRN   sodium chloride flush 0.9 % injection 10 mL 2 times per day   sodium chloride flush 0.9 % injection 10 mL PRN   magnesium hydroxide (MILK OF MAGNESIA) 400 MG/5ML suspension 30 mL Daily PRN   ondansetron (ZOFRAN) injection 4 mg Q6H PRN   nicotine (NICODERM CQ) 14 MG/24HR 1 patch Daily       Objective:  /73   Pulse 90   Temp 98.7 °F (37.1 °C) (Oral)   Resp 19   Ht 6' 2\" (1.88 m)   Wt 242 lb 8 oz (110 kg)   SpO2 94%   BMI 31.14 kg/m²     Intake/Output Summary (Last 24 hours) at 4/22/2019 1840  Last data filed at 4/22/2019 1817  Gross per 24 hour   Intake 600 ml   Output 4200 ml   Net -3600 ml      Wt Readings from Last 3 Encounters:   04/22/19 242 lb 8 oz (110 kg)   08/06/18 240 lb 8 oz (109.1 kg)       General appearance:  Appears comfortable  Eyes: Sclera clear. Pupils equal.  ENT: Moist oral mucosa. Trachea midline, no adenopathy. Cardiovascular: Regular rhythm, normal S1, S2. No murmur. No edema in lower extremities  Respiratory: Not using accessory muscles. Good inspiratory effort. Clear to auscultation bilaterally, no wheeze or crackles. GI: Abdomen soft, no tenderness, not distended, normal bowel sounds  Musculoskeletal: No cyanosis in digits, neck supple  Neurology: CN 2-12 grossly intact. No speech or motor deficits  Psych: Normal affect. Alert and oriented in time, place and person  Skin: Warm, dry, normal turgor    Labs and Tests:  CBC:   Recent Labs     04/20/19  0707 04/22/19  0600   WBC 7.8 7.1   HGB 12.3* 11.6*   PLT 73* 80*     BMP:    Recent Labs     04/20/19  0706 04/22/19  0559   * 137   K 3.8 3.7    103   CO2 25 26   BUN 8 9   CREATININE 0.6* 0.6*   GLUCOSE 98 105*     Hepatic:   Recent Labs     04/20/19  0706   AST 26   ALT 15   BILITOT 4.3*   ALKPHOS 195*         Problem List  Active Problems:    Anasarca  Resolved Problems:    * No resolved hospital problems. *       Assessment & Plan:      1. Hypervolemia secondary to liver cirrhosis . 8 L of fluid removal on 4/19, c/w IV Lasix 80 mg b.i.d. again since patient still complained about very protuberant abdominal area and abdominal pain, continued low sodium diet, consult with dietitian for pt education  -Strict I/Os and daily weights, since admission 3.9L negative balance (with ascites removed it will be 11.9L), GI following another paracentesis needed for tomorrow   2.Pleural Effusion  -CXR reveled large left sided pleural effusion, Pt not complaining of shortness of breath or chest pain. Need thoracentesis as well  -Consider thoracentesis if effusion does not resolve with treatment of ascites, will check CXR in AM.    3. Chronic Macrocytic Anemia with thrombocytopenia Likely due to liver cirrhosis,monitor   4. Tobacco abuse  -advised smoking cessation and offered nicotine patch   5.  Hx of alcohol abuse   -Pt denies any alcohol consumption since August of 2018, -Advised patient to continue to refrain from any alcohol use   6. Consult  for PCP establishment and consult palliative care         Diet: DIET LOW SODIUM 2 GM;  Code:Full Code  DVT PPX anuradhax       Darnell Woodward MD   4/22/2019 6:40 PM

## 2019-04-22 NOTE — PROGRESS NOTES
Talked to Dr. Vera Ao office and let them know if he wants testing run on Thoracentesis fluid he will need to put orders in for it per US.

## 2019-04-22 NOTE — PROGRESS NOTES
The care plan and education has been reviewed and mutually agreed upon with the patient. Assessment complete and documented. A/O, VSS. Ascites, taught abdomen. States pain 10/10, pain medication provided. No SOB noted. Consent signed for thoracentesis. Needs met. Call light in reach, will monitor.

## 2019-04-22 NOTE — PROGRESS NOTES
[facility] GI  Gastroenterology Progress Note    Axel Benson is a 61 y.o. male patient. Active Problems:    Anasarca  Resolved Problems:    * No resolved hospital problems. *      SUBJECTIVE:  Has abdominal bloating. ROS: Denies CP, SOB    Gastrointestinal ROS: positive for - abdominal pain  negative for - change in bowel habits, constipation, diarrhea, hematemesis, melena or nausea/vomiting    Physical    VITALS:  /73   Pulse 99   Temp 98.7 °F (37.1 °C) (Oral)   Resp 19   Ht 6' 2\" (1.88 m)   Wt 242 lb 8 oz (110 kg)   SpO2 94%   BMI 31.14 kg/m²   TEMPERATURE:  Current - Temp: 98.7 °F (37.1 °C); Max - Temp  Av.5 °F (36.9 °C)  Min: 98.2 °F (36.8 °C)  Max: 98.7 °F (37.1 °C)    NAD  RRR, Nl s1s2  Lungs CTA Bilaterally, normal effort  Abdomen distended but not tense, non tender, no HSM, Bowel sounds normal.    Data    Data Review:    Recent Labs     19  0707 19  0600   WBC 7.8 7.1   HGB 12.3* 11.6*   HCT 36.6* 34.6*   .2* 106.4*   PLT 73* 80*     Recent Labs     19  0706 19  0559   * 137   K 3.8 3.7    103   CO2 25 26   PHOS 2.3* 3.0   BUN 8 9   CREATININE 0.6* 0.6*     Recent Labs     19  0706   AST 26   ALT 15   BILIDIR 2.0*   BILITOT 4.3*   ALKPHOS 195*     No results for input(s): LIPASE, AMYLASE in the last 72 hours. Recent Labs     19  0707   PROTIME 21.3*   INR 1.87*     No results for input(s): PTT in the last 72 hours. Radiology Review:  CXR 19:   No interval change of infiltrate in the left lower lobe.  Stable large left   pleural effusion.  Low lung volumes.  No pneumothorax.  No free air below the   diaphragm.             ASSESSMENT :     Cirrhosis - New dx in 2018.  CT with nodular liver and large amount of ascites at that time. Likely d/t alcohol. He has significant alcohol use over 40 years but denies ETOH since dx in Aug 2018, but no f/u.  No signs of GI blood loss.  He has significant perpheral edema/anasarca. Now s/p paracentesis, but ascites fluid discarded and not sent for chemistries. He feels abd distended and uncomfortable again, already, along with anasarca. He is diuresing nicely with preservation of renal fcn. Would repeat paracentesis today. SW has provided pt with list of PCP.         PLAN  :  1) Low Na diet. 2) Continue diuresis. Rec lasix 80 mg daily PO and aldactone 100 mg daily PO at discharge. 3) Plan repeat paracentesis today with fluid analysis. Thoracentesis ordered per primary team.   4) f/u AFP, Vit B12 and folate levels. 5) Will need EGD to screen for varices and will need outpatient f/u of cirrhosis with Dr Mauricio Romero. Discussed with Dr. Naveed Sanchez, [place]  [address]  I have personally performed a face to face diagnostic evaluation on this patient. I have interviewed and examined the patient and I agree with the findings and recommended plan of care. In summary, my findings and plan are the following: As above, now s/p paracentesis and feels better. Cell ct and chemistries pending. Abd less distended on exam, but still pitting peripheral edema. Would continue present diuretic dose and change to po as above on discharge. I can see in f/u as outpatient and will plan weekly paracenteses until better control achieved. OK to discharge in AM from GI standpoint.     Deidra Vogel MD  [address] [place] [address] Liver [place]  4/22/2019

## 2019-04-23 VITALS
SYSTOLIC BLOOD PRESSURE: 110 MMHG | DIASTOLIC BLOOD PRESSURE: 70 MMHG | WEIGHT: 222 LBS | HEIGHT: 74 IN | TEMPERATURE: 98.4 F | BODY MASS INDEX: 28.49 KG/M2 | HEART RATE: 89 BPM | RESPIRATION RATE: 16 BRPM | OXYGEN SATURATION: 98 %

## 2019-04-23 LAB
ANION GAP SERPL CALCULATED.3IONS-SCNC: 5 MMOL/L (ref 3–16)
BUN BLDV-MCNC: 11 MG/DL (ref 7–20)
CALCIUM SERPL-MCNC: 8.1 MG/DL (ref 8.3–10.6)
CHLORIDE BLD-SCNC: 99 MMOL/L (ref 99–110)
CO2: 29 MMOL/L (ref 21–32)
CREAT SERPL-MCNC: 0.7 MG/DL (ref 0.8–1.3)
GFR AFRICAN AMERICAN: >60
GFR NON-AFRICAN AMERICAN: >60
GLUCOSE BLD-MCNC: 145 MG/DL (ref 70–99)
MAGNESIUM: 1.6 MG/DL (ref 1.8–2.4)
POTASSIUM SERPL-SCNC: 3.8 MMOL/L (ref 3.5–5.1)
SODIUM BLD-SCNC: 133 MMOL/L (ref 136–145)

## 2019-04-23 PROCEDURE — 80048 BASIC METABOLIC PNL TOTAL CA: CPT

## 2019-04-23 PROCEDURE — 6360000002 HC RX W HCPCS: Performed by: INTERNAL MEDICINE

## 2019-04-23 PROCEDURE — 6360000002 HC RX W HCPCS: Performed by: HOSPITALIST

## 2019-04-23 PROCEDURE — 85025 COMPLETE CBC W/AUTO DIFF WBC: CPT

## 2019-04-23 PROCEDURE — 36415 COLL VENOUS BLD VENIPUNCTURE: CPT

## 2019-04-23 PROCEDURE — P9047 ALBUMIN (HUMAN), 25%, 50ML: HCPCS | Performed by: PHYSICIAN ASSISTANT

## 2019-04-23 PROCEDURE — 6370000000 HC RX 637 (ALT 250 FOR IP): Performed by: INTERNAL MEDICINE

## 2019-04-23 PROCEDURE — 6360000002 HC RX W HCPCS: Performed by: PHYSICIAN ASSISTANT

## 2019-04-23 PROCEDURE — 83735 ASSAY OF MAGNESIUM: CPT

## 2019-04-23 PROCEDURE — 6370000000 HC RX 637 (ALT 250 FOR IP): Performed by: HOSPITALIST

## 2019-04-23 RX ORDER — SODIUM CHLORIDE 9 MG/ML
INJECTION, SOLUTION INTRAVENOUS
Status: DISCONTINUED
Start: 2019-04-23 | End: 2019-04-23 | Stop reason: HOSPADM

## 2019-04-23 RX ORDER — FUROSEMIDE 80 MG
80 TABLET ORAL DAILY
Qty: 30 TABLET | Refills: 3 | Status: ON HOLD | OUTPATIENT
Start: 2019-04-23 | End: 2020-08-29 | Stop reason: HOSPADM

## 2019-04-23 RX ORDER — SPIRONOLACTONE 100 MG/1
100 TABLET, FILM COATED ORAL DAILY
Qty: 30 TABLET | Refills: 3 | Status: ON HOLD | OUTPATIENT
Start: 2019-04-23 | End: 2020-08-29 | Stop reason: HOSPADM

## 2019-04-23 RX ORDER — POTASSIUM CHLORIDE 20 MEQ/1
20 TABLET, EXTENDED RELEASE ORAL DAILY
Qty: 30 TABLET | Refills: 5 | Status: ON HOLD | OUTPATIENT
Start: 2019-04-23 | End: 2020-08-29 | Stop reason: HOSPADM

## 2019-04-23 RX ORDER — ALBUMIN (HUMAN) 12.5 G/50ML
50 SOLUTION INTRAVENOUS ONCE
Status: COMPLETED | OUTPATIENT
Start: 2019-04-23 | End: 2019-04-23

## 2019-04-23 RX ADMIN — SPIRONOLACTONE 100 MG: 25 TABLET ORAL at 08:44

## 2019-04-23 RX ADMIN — MORPHINE SULFATE 3 MG: 4 INJECTION INTRAVENOUS at 08:44

## 2019-04-23 RX ADMIN — ALBUMIN (HUMAN) 50 G: 0.25 INJECTION, SOLUTION INTRAVENOUS at 12:24

## 2019-04-23 RX ADMIN — MORPHINE SULFATE 3 MG: 4 INJECTION INTRAVENOUS at 05:30

## 2019-04-23 RX ADMIN — MAGNESIUM OXIDE TAB 400 MG (241.3 MG ELEMENTAL MG) 800 MG: 400 (241.3 MG) TAB at 13:04

## 2019-04-23 RX ADMIN — MORPHINE SULFATE 3 MG: 4 INJECTION INTRAVENOUS at 13:04

## 2019-04-23 RX ADMIN — OXYCODONE HYDROCHLORIDE 10 MG: 5 TABLET ORAL at 10:42

## 2019-04-23 RX ADMIN — MORPHINE SULFATE 3 MG: 4 INJECTION INTRAVENOUS at 10:56

## 2019-04-23 RX ADMIN — MORPHINE SULFATE 3 MG: 4 INJECTION INTRAVENOUS at 02:03

## 2019-04-23 RX ADMIN — FUROSEMIDE 80 MG: 10 INJECTION, SOLUTION INTRAMUSCULAR; INTRAVENOUS at 08:44

## 2019-04-23 RX ADMIN — OXYCODONE HYDROCHLORIDE 10 MG: 5 TABLET ORAL at 07:31

## 2019-04-23 ASSESSMENT — PAIN SCALES - GENERAL
PAINLEVEL_OUTOF10: 8
PAINLEVEL_OUTOF10: 8
PAINLEVEL_OUTOF10: 9
PAINLEVEL_OUTOF10: 10
PAINLEVEL_OUTOF10: 8
PAINLEVEL_OUTOF10: 10
PAINLEVEL_OUTOF10: 10

## 2019-04-23 NOTE — CONSULTS
Palliative Care:      61 y.o. male with history of anasarca and alcoholic cirrhosis of liver with ascites, presented to [facility] ED secondary to peripheral edema in the lower extremities, penis, scrotum and abdomen that has significantly increased over the past few months. Patient reports having a similar episode of swelling in August of 2018 for which he received inpatient treatment; was discharged on lasix but states he has been noncompliant with medication or follow up. He reports gaining 75lbs in the past few months, and the swelling to be increasingly uncomfortable. Patient presents with hypervolemia secondary to liver cirrhosis. He does have chronic macrocytic anemia. Patient denies alcohol consumption, reports he stopped drinking in August of 2018. GI is following. Patient will need EGD to screen for varices and needs outpatient follow up for cirrhosis with Dr Evaristo Caldwell. Plans for weekly paracentesis. .Hep C Ab + but RNA neg. S/p Paracentesis 8 L removed on 4/19. Patient has been on strict I & O and daily weight, since admission has had 3.9 liters negative balance--with ascites removal totals 11.9 liters. CXR 4/2019:  FINDINGS:   No interval change of infiltrate in the left lower lobe.  Stable large left   pleural effusion.  Low lung volumes.  No pneumothorax.  No free air below the   diaphragm.        Impression   Stable left lower lobe infiltrate.  Stable large left pleural effusion. Past Medical History:   has no past medical history on file. Past Surgical History:   has no past surgical history on file. Advance Directives:      Code status is full    Problem Severity: Pain/Other Symptoms:      Patient has hydromorphone and oxycodone available for pain. Patient reports generalized aching and myalgias. Bed Mobility/Toileting/Transfer:      Has been independent with ADLs    Performance Status:     Ambulates without an assistive device.      Symptom Assessment: Appetite/Nausea/Bowels/Fatigue:     Patient is on a low sodium diet. Likes Ensure    Social History:   reports that he has been smoking. He has been smoking about 0.25 packs per day. He has never used smokeless tobacco. He reports that he drank alcohol. Family History:  family history includes High Blood Pressure in his mother. Psychological/Spiritual:   Patient is  and lives alone. He has two sons living locally. Family Discussion:      Met with patient. Reviewed clinical status in detail. States overall he is feeling better since paracentesis. Discussed with patient the importance of medical follow up.with Dr Heidy Salinas. Understands he will need an EGD, plans to make an appointment at [facility] and is hoping for a possible liver transplant, states he does not plan on drinking again. Reports his sons to be supportive. Wishes to remain a full code for now. Plans home, denies need for services.

## 2019-04-23 NOTE — CARE COORDINATION
Patient discharged 4.23.19 to home  Nurse, Dasia Gamboa,  to make appointment for follow-up at [address] discharge needs met per case management    Electronically signed by GINNY Mc on 4/23/2019 at 12:09 PM

## 2019-04-23 NOTE — PROGRESS NOTES
Call placed to lab, states that labs that were added on today for body fluid are already accounted for. Will process. Call placed to Dr. Brandon Patterson regarding low Mag, replaced orally.

## 2019-04-23 NOTE — PROGRESS NOTES
[facility] GI  Gastroenterology Progress Note    Melodie Kelley is a 61 y.o. male patient. Active Problems:    Anasarca  Resolved Problems:    * No resolved hospital problems. *      SUBJECTIVE:  No complaints. ROS: Denies CP, SOB    Gastrointestinal ROS:   negative for - abdominal pain, change in bowel habits, constipation, diarrhea, hematemesis, melena or nausea/vomiting    Physical    VITALS:  /64   Pulse 85   Temp 99 °F (37.2 °C) (Oral)   Resp 18   Ht 6' 2\" (1.88 m)   Wt 222 lb (100.7 kg)   SpO2 93%   BMI 28.50 kg/m²   TEMPERATURE:  Current - Temp: 99 °F (37.2 °C); Max - Temp  Av.7 °F (37.1 °C)  Min: 98.4 °F (36.9 °C)  Max: 99 °F (37.2 °C)    NAD  RRR, Nl s1s2  Lungs CTA Bilaterally, normal effort  Abdomen less distended, soft, non tender, no HSM, Bowel sounds normal.    Data    Data Review:    Recent Labs     19  0600   WBC 7.1   HGB 11.6*   HCT 34.6*   .4*   PLT 80*     Recent Labs     19  0559      K 3.7      CO2 26   PHOS 3.0   BUN 9   CREATININE 0.6*     No results for input(s): AST, ALT, ALB, BILIDIR, BILITOT, ALKPHOS in the last 72 hours. No results for input(s): LIPASE, AMYLASE in the last 72 hours. No results for input(s): PROTIME, INR in the last 72 hours. No results for input(s): PTT in the last 72 hours. Radiology Review:  CXR 19:   No interval change of infiltrate in the left lower lobe.  Stable large left   pleural effusion.  Low lung volumes.  No pneumothorax.  No free air below the   diaphragm.         No B12 or folate deficiency     ASSESSMENT :     Cirrhosis - New dx in 2018.  CT with nodular liver and large amount of ascites at that time. Likely d/t alcohol. He has significant alcohol use over 40 years but denies ETOH since dx in Aug 2018, but no f/u.  No signs of GI blood loss. He has significant perpheral edema/anasarca. Now s/p paracentesis, but ascites fluid discarded and not sent for chemistries.    He is diuresing nicely with preservation of renal fcn. S/p repeat paracentesis 4/22 with 6 L fluid removed. Neg for neutrocytic ascites. SAAG elevated c/w portal HTN and low protein. S/p thoracentesis yesterday as well.      PLAN  :  1) Low Na diet. 2) Continue diuresis. Rec lasix 80 mg daily PO and aldactone 100 mg daily PO at discharge. 3) f/u ascitic fluid cx and cytology  4) f/u AFP  5) will give IV albumin  6) Will need EGD to screen for varices and will need outpatient f/u of cirrhosis with Dr Marc Agarwal. Will plan weekly paracenteses as needed. [id number] [address] Dong for discharge from GI standpoint. Discussed with Dr. Koki Macias PA-C  [address]  I have personally performed a face to face diagnostic evaluation on this patient. I have interviewed and examined the patient and I agree with the findings and recommended plan of care. In summary, my findings and plan are the following: As above, ascites c/w cirrhosis/portal HTN. Feels ok today. Edema resolving with diuresis. He will likely need weekly paracenteses for symptom relief in short term. I discussed EtOH rehab and Liver transplantation with him. OK to discharge today from GI standpoint. He will f/u with me in 3 months, but will require screening EGD and Hep A and B vaccinations prior to that, along with weekly paracentesis.     Iza Ramírez MD  [address] [place] [address] Liver [place]  4/23/2019

## 2019-04-24 LAB
AFP: 5.8 UG/L
ALBUMIN FLUID: <0.2 G/DL
BASOPHILS ABSOLUTE: 0.1 K/UL (ref 0–0.2)
BASOPHILS RELATIVE PERCENT: 0.8 %
EOSINOPHILS ABSOLUTE: 0.1 K/UL (ref 0–0.6)
EOSINOPHILS RELATIVE PERCENT: 1.7 %
FLUID TYPE: NORMAL
HCT VFR BLD CALC: 36.8 % (ref 40.5–52.5)
HEMATOLOGY PATH CONSULT: NORMAL
HEMATOLOGY PATH CONSULT: YES
HEMOGLOBIN: 12.4 G/DL (ref 13.5–17.5)
LYMPHOCYTES ABSOLUTE: 1.2 K/UL (ref 1–5.1)
LYMPHOCYTES RELATIVE PERCENT: 13.8 %
MCH RBC QN AUTO: 35.6 PG (ref 26–34)
MCHC RBC AUTO-ENTMCNC: 33.7 G/DL (ref 31–36)
MCV RBC AUTO: 105.6 FL (ref 80–100)
MONOCYTES ABSOLUTE: 1.9 K/UL (ref 0–1.3)
MONOCYTES RELATIVE PERCENT: 22.7 %
NEUTROPHILS ABSOLUTE: 5.1 K/UL (ref 1.7–7.7)
NEUTROPHILS RELATIVE PERCENT: 61 %
PDW BLD-RTO: 16 % (ref 12.4–15.4)
PLATELET # BLD: 86 K/UL (ref 135–450)
PMV BLD AUTO: 9.1 FL (ref 5–10.5)
RBC # BLD: 3.48 M/UL (ref 4.2–5.9)
WBC # BLD: 8.4 K/UL (ref 4–11)

## 2019-04-25 LAB
BODY FLUID CULTURE, STERILE: NORMAL
GRAM STAIN RESULT: NORMAL

## 2019-04-26 ENCOUNTER — PRE-PROCEDURE TELEPHONE (OUTPATIENT)
Dept: INTERVENTIONAL RADIOLOGY/VASCULAR | Age: 64
End: 2019-04-26

## 2019-04-30 ENCOUNTER — HOSPITAL ENCOUNTER (OUTPATIENT)
Dept: ULTRASOUND IMAGING | Age: 64
Discharge: HOME | End: 2019-04-30
Payer: MEDICAID

## 2019-04-30 VITALS
RESPIRATION RATE: 15 BRPM | DIASTOLIC BLOOD PRESSURE: 66 MMHG | HEART RATE: 82 BPM | TEMPERATURE: 97.2 F | BODY MASS INDEX: 28.5 KG/M2 | HEIGHT: 74 IN | SYSTOLIC BLOOD PRESSURE: 113 MMHG | OXYGEN SATURATION: 98 %

## 2019-04-30 DIAGNOSIS — R18.8 OTHER ASCITES: ICD-10-CM

## 2019-04-30 LAB
APTT: 42.3 SEC (ref 26–36)
INR BLD: 1.74 (ref 0.86–1.14)
PROTHROMBIN TIME: 19.8 SEC (ref 9.8–13)

## 2019-04-30 PROCEDURE — 36415 COLL VENOUS BLD VENIPUNCTURE: CPT

## 2019-04-30 PROCEDURE — 85610 PROTHROMBIN TIME: CPT

## 2019-04-30 PROCEDURE — 2500000003 HC RX 250 WO HCPCS: Performed by: INTERNAL MEDICINE

## 2019-04-30 PROCEDURE — 6370000000 HC RX 637 (ALT 250 FOR IP): Performed by: INTERNAL MEDICINE

## 2019-04-30 PROCEDURE — 7100000010 HC PHASE II RECOVERY - FIRST 15 MIN

## 2019-04-30 PROCEDURE — 49083 ABD PARACENTESIS W/IMAGING: CPT

## 2019-04-30 PROCEDURE — 85730 THROMBOPLASTIN TIME PARTIAL: CPT

## 2019-04-30 PROCEDURE — 7100000011 HC PHASE II RECOVERY - ADDTL 15 MIN

## 2019-04-30 RX ORDER — LIDOCAINE HYDROCHLORIDE 10 MG/ML
5 INJECTION, SOLUTION EPIDURAL; INFILTRATION; INTRACAUDAL; PERINEURAL ONCE
Status: COMPLETED | OUTPATIENT
Start: 2019-04-30 | End: 2019-04-30

## 2019-04-30 RX ORDER — ACETAMINOPHEN 325 MG/1
650 TABLET ORAL EVERY 4 HOURS PRN
Status: DISCONTINUED | OUTPATIENT
Start: 2019-04-30 | End: 2019-05-01 | Stop reason: HOSPADM

## 2019-04-30 RX ORDER — BACITRACIN ZINC AND POLYMYXIN B SULFATE 500; 1000 [USP'U]/G; [USP'U]/G
OINTMENT TOPICAL ONCE
Status: COMPLETED | OUTPATIENT
Start: 2019-04-30 | End: 2019-04-30

## 2019-04-30 RX ADMIN — BACITRACIN ZINC AND POLYMYXIN B SULFATE: at 13:40

## 2019-04-30 RX ADMIN — LIDOCAINE HYDROCHLORIDE 5 ML: 10 INJECTION, SOLUTION EPIDURAL; INFILTRATION; INTRACAUDAL; PERINEURAL at 13:12

## 2019-04-30 NOTE — PROGRESS NOTES
Pt arrived to same day from radiology post paracentesis, site c/d/i. Pt a&o x4, VSS. Call light and belongings in reach, will continue to monitor.

## 2019-05-09 ENCOUNTER — HOSPITAL ENCOUNTER (OUTPATIENT)
Dept: ULTRASOUND IMAGING | Age: 64
Discharge: HOME | End: 2019-05-09
Payer: MEDICAID

## 2019-05-09 VITALS
HEIGHT: 74 IN | TEMPERATURE: 98 F | SYSTOLIC BLOOD PRESSURE: 112 MMHG | OXYGEN SATURATION: 98 % | DIASTOLIC BLOOD PRESSURE: 67 MMHG | WEIGHT: 222 LBS | RESPIRATION RATE: 18 BRPM | HEART RATE: 84 BPM | BODY MASS INDEX: 28.49 KG/M2

## 2019-05-09 DIAGNOSIS — R18.8 OTHER ASCITES: ICD-10-CM

## 2019-05-09 LAB
APTT: 42.3 SEC (ref 26–36)
INR BLD: 1.64 (ref 0.86–1.14)
PROTHROMBIN TIME: 18.7 SEC (ref 9.8–13)

## 2019-05-09 PROCEDURE — 7100000010 HC PHASE II RECOVERY - FIRST 15 MIN

## 2019-05-09 PROCEDURE — 85610 PROTHROMBIN TIME: CPT

## 2019-05-09 PROCEDURE — 2500000003 HC RX 250 WO HCPCS: Performed by: INTERNAL MEDICINE

## 2019-05-09 PROCEDURE — 7100000011 HC PHASE II RECOVERY - ADDTL 15 MIN

## 2019-05-09 PROCEDURE — 49083 ABD PARACENTESIS W/IMAGING: CPT

## 2019-05-09 PROCEDURE — 85730 THROMBOPLASTIN TIME PARTIAL: CPT

## 2019-05-09 PROCEDURE — 36415 COLL VENOUS BLD VENIPUNCTURE: CPT

## 2019-05-09 PROCEDURE — 6370000000 HC RX 637 (ALT 250 FOR IP): Performed by: INTERNAL MEDICINE

## 2019-05-09 RX ORDER — ACETAMINOPHEN 325 MG/1
650 TABLET ORAL EVERY 4 HOURS PRN
Status: DISCONTINUED | OUTPATIENT
Start: 2019-05-09 | End: 2019-05-10 | Stop reason: HOSPADM

## 2019-05-09 RX ORDER — LIDOCAINE HYDROCHLORIDE 10 MG/ML
5 INJECTION, SOLUTION EPIDURAL; INFILTRATION; INTRACAUDAL; PERINEURAL ONCE
Status: COMPLETED | OUTPATIENT
Start: 2019-05-09 | End: 2019-05-09

## 2019-05-09 RX ORDER — ALBUMIN (HUMAN) 12.5 G/50ML
50 SOLUTION INTRAVENOUS ONCE
Status: DISCONTINUED | OUTPATIENT
Start: 2019-05-09 | End: 2019-05-10 | Stop reason: HOSPADM

## 2019-05-09 RX ADMIN — LIDOCAINE HYDROCHLORIDE 5 ML: 10 INJECTION, SOLUTION EPIDURAL; INFILTRATION; INTRACAUDAL; PERINEURAL at 13:45

## 2019-05-09 RX ADMIN — NEOMYCIN AND POLYMYXIN B SULFATES, BACITRACIN ZINC, AND HYDROCORTISONE: 3.5; 5000; 400; 1 OINTMENT TOPICAL at 14:37

## 2019-05-09 ASSESSMENT — PAIN - FUNCTIONAL ASSESSMENT: PAIN_FUNCTIONAL_ASSESSMENT: 0-10

## 2019-05-09 NOTE — PROGRESS NOTES
Teaching / education initiated regarding perioperative experience, expectations, and pain management during stay. Patient verbalized understanding. Patient awake, alert and oriented. VSS. Denies pain. Call light in reach. Continue to monitor.   Caleb Batres RN

## 2019-05-09 NOTE — PROGRESS NOTES
Pt arrived to same day from radiology post paracentesis, site c/d/i. Pt a&o x4, VSS. Updated on POC, will continue to monitor.

## 2019-05-13 ENCOUNTER — PRE-PROCEDURE TELEPHONE (OUTPATIENT)
Dept: INTERVENTIONAL RADIOLOGY/VASCULAR | Age: 64
End: 2019-05-13

## 2019-05-16 ENCOUNTER — HOSPITAL ENCOUNTER (OUTPATIENT)
Dept: ULTRASOUND IMAGING | Age: 64
Discharge: HOME | End: 2019-05-16
Payer: MEDICAID

## 2019-05-16 VITALS
TEMPERATURE: 97.2 F | HEART RATE: 78 BPM | RESPIRATION RATE: 18 BRPM | OXYGEN SATURATION: 98 % | SYSTOLIC BLOOD PRESSURE: 111 MMHG | DIASTOLIC BLOOD PRESSURE: 69 MMHG

## 2019-05-16 DIAGNOSIS — R18.8 OTHER ASCITES: ICD-10-CM

## 2019-05-16 LAB
APTT: 40.2 SEC (ref 26–36)
INR BLD: 1.54 (ref 0.86–1.14)
PROTHROMBIN TIME: 17.5 SEC (ref 9.8–13)

## 2019-05-16 PROCEDURE — 85730 THROMBOPLASTIN TIME PARTIAL: CPT

## 2019-05-16 PROCEDURE — 85610 PROTHROMBIN TIME: CPT

## 2019-05-16 PROCEDURE — 7100000010 HC PHASE II RECOVERY - FIRST 15 MIN

## 2019-05-16 PROCEDURE — 49083 ABD PARACENTESIS W/IMAGING: CPT

## 2019-05-16 PROCEDURE — 2500000003 HC RX 250 WO HCPCS: Performed by: INTERNAL MEDICINE

## 2019-05-16 PROCEDURE — 7100000011 HC PHASE II RECOVERY - ADDTL 15 MIN

## 2019-05-16 PROCEDURE — 36415 COLL VENOUS BLD VENIPUNCTURE: CPT

## 2019-05-16 PROCEDURE — 6370000000 HC RX 637 (ALT 250 FOR IP): Performed by: INTERNAL MEDICINE

## 2019-05-16 RX ORDER — LIDOCAINE HYDROCHLORIDE 10 MG/ML
5 INJECTION, SOLUTION EPIDURAL; INFILTRATION; INTRACAUDAL; PERINEURAL ONCE
Status: COMPLETED | OUTPATIENT
Start: 2019-05-16 | End: 2019-05-16

## 2019-05-16 RX ADMIN — NEOMYCIN AND POLYMYXIN B SULFATES, BACITRACIN ZINC, AND HYDROCORTISONE: 3.5; 5000; 400; 1 OINTMENT TOPICAL at 14:40

## 2019-05-16 RX ADMIN — LIDOCAINE HYDROCHLORIDE 5 ML: 10 INJECTION, SOLUTION EPIDURAL; INFILTRATION; INTRACAUDAL; PERINEURAL at 14:45

## 2019-05-16 NOTE — PROGRESS NOTES
Pt arrived to me in same day surgery for phase 2 recovery monitoring and care prior to d/c from Radiology/Special Procedures/Ultrasound in good condition. Pt is alert and oriented X4. Report received from Radiology RN. S/p paracentesis 2500mL removed from abd with Dr. Pepito Rodas  Dressing: CDI  Pt may leave per MD order: after eval    Pt wants to eat; we have a tray here ready for him.       Maryann REYESN, RN, [facility]  Pre-Op/Recovery   Same Day Surgery

## 2019-05-23 ENCOUNTER — HOSPITAL ENCOUNTER (OUTPATIENT)
Dept: ULTRASOUND IMAGING | Age: 64
Discharge: HOME | End: 2019-05-23
Payer: MEDICAID

## 2019-05-23 VITALS
RESPIRATION RATE: 18 BRPM | DIASTOLIC BLOOD PRESSURE: 88 MMHG | SYSTOLIC BLOOD PRESSURE: 136 MMHG | WEIGHT: 212.56 LBS | TEMPERATURE: 98 F | BODY MASS INDEX: 27.29 KG/M2 | HEART RATE: 98 BPM | OXYGEN SATURATION: 97 %

## 2019-05-23 DIAGNOSIS — R18.8 OTHER ASCITES: ICD-10-CM

## 2019-05-23 LAB
APTT: 45.2 SEC (ref 26–36)
INR BLD: 1.52 (ref 0.86–1.14)
PROTHROMBIN TIME: 17.3 SEC (ref 9.8–13)

## 2019-05-23 PROCEDURE — P9047 ALBUMIN (HUMAN), 25%, 50ML: HCPCS

## 2019-05-23 PROCEDURE — 6370000000 HC RX 637 (ALT 250 FOR IP): Performed by: INTERNAL MEDICINE

## 2019-05-23 PROCEDURE — 7100000010 HC PHASE II RECOVERY - FIRST 15 MIN

## 2019-05-23 PROCEDURE — 7100000011 HC PHASE II RECOVERY - ADDTL 15 MIN

## 2019-05-23 PROCEDURE — 2500000003 HC RX 250 WO HCPCS: Performed by: INTERNAL MEDICINE

## 2019-05-23 PROCEDURE — 85610 PROTHROMBIN TIME: CPT

## 2019-05-23 PROCEDURE — 36415 COLL VENOUS BLD VENIPUNCTURE: CPT

## 2019-05-23 PROCEDURE — 6360000002 HC RX W HCPCS

## 2019-05-23 PROCEDURE — 49083 ABD PARACENTESIS W/IMAGING: CPT

## 2019-05-23 PROCEDURE — 85730 THROMBOPLASTIN TIME PARTIAL: CPT

## 2019-05-23 RX ORDER — ALBUMIN (HUMAN) 12.5 G/50ML
0-250 SOLUTION INTRAVENOUS ONCE
Status: COMPLETED | OUTPATIENT
Start: 2019-05-23 | End: 2019-05-23

## 2019-05-23 RX ORDER — ALBUMIN (HUMAN) 12.5 G/50ML
SOLUTION INTRAVENOUS
Status: COMPLETED
Start: 2019-05-23 | End: 2019-05-23

## 2019-05-23 RX ORDER — ACETAMINOPHEN 325 MG/1
650 TABLET ORAL EVERY 4 HOURS PRN
Status: DISCONTINUED | OUTPATIENT
Start: 2019-05-23 | End: 2019-05-24 | Stop reason: HOSPADM

## 2019-05-23 RX ORDER — LIDOCAINE HYDROCHLORIDE 10 MG/ML
5 INJECTION, SOLUTION EPIDURAL; INFILTRATION; INTRACAUDAL; PERINEURAL ONCE
Status: COMPLETED | OUTPATIENT
Start: 2019-05-23 | End: 2019-05-23

## 2019-05-23 RX ADMIN — LIDOCAINE HYDROCHLORIDE 5 ML: 10 INJECTION, SOLUTION EPIDURAL; INFILTRATION; INTRACAUDAL; PERINEURAL at 14:30

## 2019-05-23 RX ADMIN — Medication: at 15:50

## 2019-05-23 RX ADMIN — ALBUMIN (HUMAN) 100 ML: 12.5 SOLUTION INTRAVENOUS at 16:23

## 2019-05-23 RX ADMIN — ALBUMIN (HUMAN) 100 ML: 0.25 INJECTION, SOLUTION INTRAVENOUS at 16:23

## 2019-05-23 ASSESSMENT — PAIN - FUNCTIONAL ASSESSMENT: PAIN_FUNCTIONAL_ASSESSMENT: 0-10

## 2019-05-23 NOTE — DISCHARGE SUMMARY
need outpatient f/u of cirrhosis with Dr Mauricio Romero. Will plan weekly paracenteses as needed. [id number] [address] Dong for discharge. Discharge Medications:   Discharge Medication List as of 4/23/2019  3:34 PM      START taking these medications    Details   potassium chloride (KLOR-CON M) 20 MEQ extended release tablet Take 1 tablet by mouth daily, Disp-30 tablet, R-5Normal           Discharge Medication List as of 4/23/2019  3:34 PM      CONTINUE these medications which have CHANGED    Details   furosemide (LASIX) 80 MG tablet Take 1 tablet by mouth daily, Disp-30 tablet, R-3Normal      spironolactone (ALDACTONE) 100 MG tablet Take 1 tablet by mouth daily, Disp-30 tablet, R-3Normal           Discharge Medication List as of 4/23/2019  3:34 PM        Discharge Medication List as of 4/23/2019  3:34 PM      STOP taking these medications       nicotine (NICODERM CQ) 14 MG/24HR Comments:   Reason for Stopping:         Multiple Vitamins-Minerals (THERAPEUTIC MULTIVITAMIN-MINERALS) tablet Comments:   Reason for Stopping:               Discharge ROS:  A complete review of systems was asked and negative. Discharge Exam:    /70   Pulse 89   Temp 98.4 °F (36.9 °C) (Oral)   Resp 16   Ht 6' 2\" (1.88 m)   Wt 222 lb (100.7 kg)   SpO2 98%   BMI 28.50 kg/m²   General appearance:  NAD  HEENT:   Normal cephalic, atraumatic, moist mucous membranes, no oropharyngeal erythema or exudate  Neck: Supple, trachea midline, no anterior cervical or SC LAD  Heart[de-identified] Normal s1/s2, RRR, no murmurs, gallops, or rubs. no leg edema  Lungs:  Still a bit diminished on the left but better Clear to auscultation, bilaterally without Rales/Wheezes/Rhonchi. Abdomen: Soft, non-tender, non-distended, bowel sounds present, no masses  Musculoskeletal:  No clubbing, no cyanosis, no edema  Skin: No lesion or masses  Neurologic:  Neurovascularly intact without any focal sensory/motor deficits.  Cranial nerves: II-XII intact, grossly non-focal.  Psychiatric:  A & O x3  Neuro: Grossly intact, moves all four extremities     Labs: For convenience and continuity at follow-up the following most recent labs are provided:    Lab Results   Component Value Date    WBC 8.4 04/23/2019    HGB 12.4 04/23/2019    HCT 36.8 04/23/2019    .6 04/23/2019    PLT 86 04/23/2019     04/23/2019    K 3.8 04/23/2019    CL 99 04/23/2019    CO2 29 04/23/2019    BUN 11 04/23/2019    CREATININE 0.7 04/23/2019    CALCIUM 8.1 04/23/2019    PHOS 3.0 04/22/2019    ALKPHOS 195 04/20/2019    ALT 15 04/20/2019    AST 26 04/20/2019    BILITOT 4.3 04/20/2019    BILIDIR 2.0 04/20/2019    LABALBU 1.9 04/20/2019     Lab Results   Component Value Date    INR 1.54 (H) 05/16/2019    INR 1.64 (H) 05/09/2019    INR 1.74 (H) 04/30/2019       Radiology:  Us Guided Paracentesis    Result Date: 5/16/2019  PROCEDURE: ULTRASOUND GUIDED PARACENTESIS 5/16/2019 HISTORY: ORDERING SYSTEM PROVIDED HISTORY: Other ascites TECHNIQUE: Informed consent was obtained after a detailed explanation of the procedure including risks, benefits, and alternatives. Universal protocol was followed. The right abdomen was prepped and draped in sterile fashion and local anesthesia was achieved with lidocaine. A 5 Armenian needle sheath was advanced under ultrasound guidance into ascites and paracentesis was performed. The patient tolerated the procedure well. FINDINGS: A total of 2500 mL clear yellow fluid was removed and discarded. Successful ultrasound guided paracentesis.      Us Guided Paracentesis    Result Date: 5/9/2019  PROCEDURE: ULTRASOUND GUIDED PARACENTESIS 5/9/2019 HISTORY: ORDERING SYSTEM PROVIDED HISTORY: Other ascites TECHNOLOGIST PROVIDED HISTORY: Ordering Physician Provided Reason for Exam: ascites Acuity: Chronic Type of Exam: Ongoing Additional signs and symptoms: na Relevant Medical/Surgical History: na TECHNIQUE: Informed consent was obtained after a detailed explanation of the procedure including risks, benefits, and alternatives. Universal protocol was followed. The right abdomen was prepped and draped in sterile fashion and local anesthesia was achieved with lidocaine. A 5 Scottish needle sheath was advanced under ultrasound guidance into ascites and paracentesis was performed. The patient tolerated the procedure well. FINDINGS: A total of 3.2 L of clear yellow fluid was removed. Successful ultrasound guided paracentesis. Us Guided Paracentesis    Result Date: 4/30/2019  PROCEDURE: ULTRASOUND GUIDED PARACENTESIS 4/30/2019 HISTORY: ORDERING SYSTEM PROVIDED HISTORY: Other ascites TECHNOLOGIST PROVIDED HISTORY: Ordering Physician Provided Reason for Exam: ascites Acuity: Unknown Type of Exam: Subsequent/Follow-up TECHNIQUE: Informed consent was obtained after a detailed explanation of the procedure including risks, benefits, and alternatives. Universal protocol was followed. The right abdomen was prepped and draped in sterile fashion and local anesthesia was achieved with lidocaine. A 5 Scottish needle sheath was advanced under ultrasound guidance into ascites and paracentesis was performed. The patient tolerated the procedure well. FINDINGS: A total of 4.2 L clear yellow fluid was removed and discarded. Successful ultrasound guided paracentesis. The patient was seen and examined on day of discharge and this discharge summary is in conjunction with any daily progress note from day of discharge. Time Spent on discharge is 30 minutes  in the examination, evaluation, counseling and review of medications and discharge plan. Note that greater  than 30 minutes was spent in preparing discharge papers, discussing discharge with patient, medication review, etc.       Signed:    Dane Grubbs MD   5/23/2019      Thank you No primary care provider on file. for the opportunity to be involved in this patient's care.  If you have any questions or concerns please feel free to contact me at [phone]

## 2019-05-23 NOTE — PROGRESS NOTES
Pt arrived to same day from radiology post paracentesis, site c/d/i. Pt a&o x4, VSS. Food ordered, will continue to monitor.

## 2019-05-30 ENCOUNTER — HOSPITAL ENCOUNTER (OUTPATIENT)
Dept: ULTRASOUND IMAGING | Age: 64
Discharge: HOME | End: 2019-05-30
Payer: MEDICAID

## 2019-05-30 VITALS
DIASTOLIC BLOOD PRESSURE: 82 MMHG | SYSTOLIC BLOOD PRESSURE: 130 MMHG | TEMPERATURE: 98.2 F | HEART RATE: 81 BPM | WEIGHT: 216 LBS | HEIGHT: 74 IN | OXYGEN SATURATION: 97 % | BODY MASS INDEX: 27.72 KG/M2 | RESPIRATION RATE: 20 BRPM

## 2019-05-30 DIAGNOSIS — R18.8 OTHER ASCITES: ICD-10-CM

## 2019-05-30 LAB
APTT: 41.1 SEC (ref 26–36)
INR BLD: 1.62 (ref 0.86–1.14)
PROTHROMBIN TIME: 18.5 SEC (ref 9.8–13)

## 2019-05-30 PROCEDURE — 85610 PROTHROMBIN TIME: CPT

## 2019-05-30 PROCEDURE — 7100000011 HC PHASE II RECOVERY - ADDTL 15 MIN

## 2019-05-30 PROCEDURE — 85730 THROMBOPLASTIN TIME PARTIAL: CPT

## 2019-05-30 PROCEDURE — 6370000000 HC RX 637 (ALT 250 FOR IP): Performed by: INTERNAL MEDICINE

## 2019-05-30 PROCEDURE — 36415 COLL VENOUS BLD VENIPUNCTURE: CPT

## 2019-05-30 PROCEDURE — 2709999900 US GUIDED PARACENTESIS

## 2019-05-30 PROCEDURE — 7100000010 HC PHASE II RECOVERY - FIRST 15 MIN

## 2019-05-30 PROCEDURE — 2500000003 HC RX 250 WO HCPCS: Performed by: INTERNAL MEDICINE

## 2019-05-30 RX ORDER — LIDOCAINE HYDROCHLORIDE 10 MG/ML
5 INJECTION, SOLUTION EPIDURAL; INFILTRATION; INTRACAUDAL; PERINEURAL ONCE
Status: COMPLETED | OUTPATIENT
Start: 2019-05-30 | End: 2019-05-30

## 2019-05-30 RX ORDER — BACITRACIN ZINC AND POLYMYXIN B SULFATE 500; 1000 [USP'U]/G; [USP'U]/G
OINTMENT TOPICAL ONCE
Status: COMPLETED | OUTPATIENT
Start: 2019-05-30 | End: 2019-05-30

## 2019-05-30 RX ADMIN — LIDOCAINE HYDROCHLORIDE 5 ML: 10 INJECTION, SOLUTION EPIDURAL; INFILTRATION; INTRACAUDAL; PERINEURAL at 13:30

## 2019-05-30 RX ADMIN — BACITRACIN ZINC AND POLYMYXIN B SULFATE: at 13:50

## 2019-05-30 ASSESSMENT — PAIN - FUNCTIONAL ASSESSMENT: PAIN_FUNCTIONAL_ASSESSMENT: 0-10

## 2019-05-30 NOTE — PROGRESS NOTES
Pt arrived to me in same day surgery for phase 2 recovery monitoring and care prior to d/c from Radiology/Special Procedures/Ultrasound in good condition. Pt is alert and oriented X4. Report received from Radiology RN.     S/p paracentesis, 3400mL removed with Dr. Luis Angel Gamez  Dressing: CDI  Pt may leave per MD order: after brief evaluation    Niesha REYESN, RN, [facility]  Pre-Op/Recovery   Same Day Surgery

## 2019-05-30 NOTE — PROGRESS NOTES
Teaching / education initiated regarding perioperative experience, expectations, and pain management during stay. Patient verbalized understanding. Patient awake, alert and oriented. VSS. Denies pain. No distress noted. Call light in reach. Continue to monitor.   Eloisa Elias RN

## 2019-06-06 ENCOUNTER — HOSPITAL ENCOUNTER (OUTPATIENT)
Dept: INTERVENTIONAL RADIOLOGY/VASCULAR | Age: 64
Discharge: HOME | End: 2019-06-06
Payer: MEDICAID

## 2019-06-06 VITALS
TEMPERATURE: 98.2 F | HEIGHT: 74 IN | SYSTOLIC BLOOD PRESSURE: 120 MMHG | BODY MASS INDEX: 28.23 KG/M2 | RESPIRATION RATE: 16 BRPM | HEART RATE: 82 BPM | DIASTOLIC BLOOD PRESSURE: 78 MMHG | OXYGEN SATURATION: 98 % | WEIGHT: 220 LBS

## 2019-06-06 DIAGNOSIS — R18.8 OTHER ASCITES: ICD-10-CM

## 2019-06-06 LAB
APTT: 44.6 SEC (ref 26–36)
INR BLD: 1.53 (ref 0.86–1.14)
PROTHROMBIN TIME: 17.4 SEC (ref 9.8–13)

## 2019-06-06 PROCEDURE — 36415 COLL VENOUS BLD VENIPUNCTURE: CPT

## 2019-06-06 PROCEDURE — 7100000010 HC PHASE II RECOVERY - FIRST 15 MIN

## 2019-06-06 PROCEDURE — 85730 THROMBOPLASTIN TIME PARTIAL: CPT

## 2019-06-06 PROCEDURE — 85610 PROTHROMBIN TIME: CPT

## 2019-06-06 PROCEDURE — 7100000011 HC PHASE II RECOVERY - ADDTL 15 MIN

## 2019-06-06 PROCEDURE — 49083 ABD PARACENTESIS W/IMAGING: CPT

## 2019-06-06 PROCEDURE — C1729 CATH, DRAINAGE: HCPCS

## 2019-06-06 RX ORDER — ACETAMINOPHEN 325 MG/1
650 TABLET ORAL EVERY 4 HOURS PRN
Status: DISCONTINUED | OUTPATIENT
Start: 2019-06-06 | End: 2019-06-07 | Stop reason: HOSPADM

## 2019-06-06 ASSESSMENT — PAIN - FUNCTIONAL ASSESSMENT: PAIN_FUNCTIONAL_ASSESSMENT: 0-10

## 2019-06-06 NOTE — PROGRESS NOTES
Patient/report received from Ultrasound, vss, a/o, drsg to paracentesis site c/d/i, denies pain/needs

## 2019-06-06 NOTE — PROGRESS NOTES
3000ml of fluid removed  Spoke to patients RN sameday and advised her the amount of fluid removed and that patient was returning to the floor.

## 2019-06-13 ENCOUNTER — HOSPITAL ENCOUNTER (OUTPATIENT)
Dept: INTERVENTIONAL RADIOLOGY/VASCULAR | Age: 64
Discharge: HOME | End: 2019-06-13
Payer: MEDICAID

## 2019-06-13 VITALS
OXYGEN SATURATION: 98 % | SYSTOLIC BLOOD PRESSURE: 116 MMHG | TEMPERATURE: 97 F | WEIGHT: 226 LBS | RESPIRATION RATE: 18 BRPM | DIASTOLIC BLOOD PRESSURE: 75 MMHG | BODY MASS INDEX: 29.02 KG/M2 | HEART RATE: 75 BPM

## 2019-06-13 DIAGNOSIS — R18.8 OTHER ASCITES: ICD-10-CM

## 2019-06-13 LAB
APTT: 43.8 SEC (ref 26–36)
INR BLD: 1.54 (ref 0.86–1.14)
PROTHROMBIN TIME: 17.6 SEC (ref 9.8–13)

## 2019-06-13 PROCEDURE — 2500000003 HC RX 250 WO HCPCS: Performed by: RADIOLOGY

## 2019-06-13 PROCEDURE — 49083 ABD PARACENTESIS W/IMAGING: CPT

## 2019-06-13 PROCEDURE — 7100000010 HC PHASE II RECOVERY - FIRST 15 MIN

## 2019-06-13 PROCEDURE — C1729 CATH, DRAINAGE: HCPCS

## 2019-06-13 PROCEDURE — 85610 PROTHROMBIN TIME: CPT

## 2019-06-13 PROCEDURE — 85730 THROMBOPLASTIN TIME PARTIAL: CPT

## 2019-06-13 RX ORDER — LIDOCAINE HYDROCHLORIDE 10 MG/ML
5 INJECTION, SOLUTION EPIDURAL; INFILTRATION; INTRACAUDAL; PERINEURAL ONCE
Status: COMPLETED | OUTPATIENT
Start: 2019-06-13 | End: 2019-06-13

## 2019-06-13 RX ADMIN — LIDOCAINE HYDROCHLORIDE 5 ML: 10 INJECTION, SOLUTION EPIDURAL; INFILTRATION; INTRACAUDAL; PERINEURAL at 13:15

## 2019-06-13 ASSESSMENT — PAIN SCALES - GENERAL: PAINLEVEL_OUTOF10: 0

## 2019-06-13 ASSESSMENT — PAIN - FUNCTIONAL ASSESSMENT: PAIN_FUNCTIONAL_ASSESSMENT: 0-10

## 2019-06-14 ENCOUNTER — PRE-PROCEDURE TELEPHONE (OUTPATIENT)
Dept: INTERVENTIONAL RADIOLOGY/VASCULAR | Age: 64
End: 2019-06-14

## 2019-06-20 ENCOUNTER — HOSPITAL ENCOUNTER (OUTPATIENT)
Dept: ULTRASOUND IMAGING | Age: 64
Discharge: HOME | End: 2019-06-20
Payer: MEDICAID

## 2019-06-20 VITALS
TEMPERATURE: 97.4 F | HEART RATE: 72 BPM | SYSTOLIC BLOOD PRESSURE: 123 MMHG | OXYGEN SATURATION: 96 % | RESPIRATION RATE: 16 BRPM | DIASTOLIC BLOOD PRESSURE: 78 MMHG

## 2019-06-20 DIAGNOSIS — R18.8 OTHER ASCITES: ICD-10-CM

## 2019-06-20 LAB
APTT: 42.9 SEC (ref 26–36)
INR BLD: 1.57 (ref 0.86–1.14)
PROTHROMBIN TIME: 17.9 SEC (ref 9.8–13)

## 2019-06-20 PROCEDURE — 7100000010 HC PHASE II RECOVERY - FIRST 15 MIN

## 2019-06-20 PROCEDURE — 85730 THROMBOPLASTIN TIME PARTIAL: CPT

## 2019-06-20 PROCEDURE — 36415 COLL VENOUS BLD VENIPUNCTURE: CPT

## 2019-06-20 PROCEDURE — 6370000000 HC RX 637 (ALT 250 FOR IP): Performed by: INTERNAL MEDICINE

## 2019-06-20 PROCEDURE — 85610 PROTHROMBIN TIME: CPT

## 2019-06-20 PROCEDURE — 49083 ABD PARACENTESIS W/IMAGING: CPT

## 2019-06-20 PROCEDURE — 7100000011 HC PHASE II RECOVERY - ADDTL 15 MIN

## 2019-06-20 PROCEDURE — 2500000003 HC RX 250 WO HCPCS: Performed by: INTERNAL MEDICINE

## 2019-06-20 RX ORDER — BACITRACIN, NEOMYCIN, POLYMYXIN B 400; 3.5; 5 [USP'U]/G; MG/G; [USP'U]/G
OINTMENT TOPICAL ONCE
Status: COMPLETED | OUTPATIENT
Start: 2019-06-20 | End: 2019-06-20

## 2019-06-20 RX ORDER — ALBUMIN (HUMAN) 12.5 G/50ML
50 SOLUTION INTRAVENOUS ONCE
Status: DISCONTINUED | OUTPATIENT
Start: 2019-06-20 | End: 2019-06-21 | Stop reason: HOSPADM

## 2019-06-20 RX ORDER — LIDOCAINE HYDROCHLORIDE 10 MG/ML
5 INJECTION, SOLUTION EPIDURAL; INFILTRATION; INTRACAUDAL; PERINEURAL ONCE
Status: COMPLETED | OUTPATIENT
Start: 2019-06-20 | End: 2019-06-20

## 2019-06-20 RX ADMIN — LIDOCAINE HYDROCHLORIDE 5 ML: 10 INJECTION, SOLUTION EPIDURAL; INFILTRATION; INTRACAUDAL; PERINEURAL at 13:00

## 2019-06-20 RX ADMIN — BACITRACIN ZINC, NEOMYCIN SULFATE, POLYMYXIN B SULFATE: 3.5; 5000; 4 OINTMENT TOPICAL at 13:40

## 2019-06-20 NOTE — PROGRESS NOTES
Pt arrived to same day from radiology post paracentesis, site c/d/i. Pt a&o x4, VSS. Food ordered per ot request, will continue to monitor.

## 2019-06-20 NOTE — PROGRESS NOTES
Pt in preop bay 1 awaiting lab results prior to paracentesis. Pt is awake alert and oriented, vss. All personal belongings kept with pt on cart. Call light within reach. Will continue to monitor.

## 2019-06-27 ENCOUNTER — HOSPITAL ENCOUNTER (OUTPATIENT)
Dept: INTERVENTIONAL RADIOLOGY/VASCULAR | Age: 64
Discharge: HOME | End: 2019-06-27
Payer: MEDICAID

## 2019-06-27 VITALS
DIASTOLIC BLOOD PRESSURE: 78 MMHG | RESPIRATION RATE: 16 BRPM | TEMPERATURE: 97.1 F | SYSTOLIC BLOOD PRESSURE: 123 MMHG | OXYGEN SATURATION: 95 % | HEART RATE: 92 BPM

## 2019-06-27 DIAGNOSIS — K74.60 HEPATIC CIRRHOSIS, UNSPECIFIED HEPATIC CIRRHOSIS TYPE, UNSPECIFIED WHETHER ASCITES PRESENT (HCC): ICD-10-CM

## 2019-06-27 LAB
APTT: 43.1 SEC (ref 26–36)
INR BLD: 1.54 (ref 0.86–1.14)
PROTHROMBIN TIME: 17.6 SEC (ref 9.8–13)

## 2019-06-27 PROCEDURE — C1729 CATH, DRAINAGE: HCPCS

## 2019-06-27 PROCEDURE — 85610 PROTHROMBIN TIME: CPT

## 2019-06-27 PROCEDURE — 2500000003 HC RX 250 WO HCPCS: Performed by: RADIOLOGY

## 2019-06-27 PROCEDURE — 36415 COLL VENOUS BLD VENIPUNCTURE: CPT

## 2019-06-27 PROCEDURE — 85730 THROMBOPLASTIN TIME PARTIAL: CPT

## 2019-06-27 PROCEDURE — 7100000010 HC PHASE II RECOVERY - FIRST 15 MIN

## 2019-06-27 PROCEDURE — 49083 ABD PARACENTESIS W/IMAGING: CPT

## 2019-06-27 PROCEDURE — 7100000011 HC PHASE II RECOVERY - ADDTL 15 MIN

## 2019-06-27 RX ORDER — LIDOCAINE HYDROCHLORIDE 10 MG/ML
20 INJECTION, SOLUTION EPIDURAL; INFILTRATION; INTRACAUDAL; PERINEURAL ONCE
Status: COMPLETED | OUTPATIENT
Start: 2019-06-27 | End: 2019-06-27

## 2019-06-27 RX ADMIN — LIDOCAINE HYDROCHLORIDE 8 ML: 10 INJECTION, SOLUTION EPIDURAL; INFILTRATION; INTRACAUDAL; PERINEURAL at 13:00

## 2019-06-27 NOTE — PROGRESS NOTES
Pt arrived to same day from radiology post paracentesis, site c/d/i. Pt a&o x4, VSS. Food ordered as requested. Call light and belongings in reach, will continue to monitor.

## 2019-06-27 NOTE — PROGRESS NOTES
3850ml of fluid removed  Spoke to patients RN and advised her the amount of fluid removed and that patient was returning to same day.

## 2019-07-11 ENCOUNTER — HOSPITAL ENCOUNTER (OUTPATIENT)
Dept: INTERVENTIONAL RADIOLOGY/VASCULAR | Age: 64
Discharge: HOME | End: 2019-07-11
Payer: MEDICAID

## 2019-07-11 VITALS
OXYGEN SATURATION: 93 % | BODY MASS INDEX: 30.8 KG/M2 | HEART RATE: 87 BPM | RESPIRATION RATE: 18 BRPM | DIASTOLIC BLOOD PRESSURE: 70 MMHG | SYSTOLIC BLOOD PRESSURE: 105 MMHG | TEMPERATURE: 97.2 F | HEIGHT: 74 IN | WEIGHT: 240 LBS

## 2019-07-11 DIAGNOSIS — K70.31 ASCITES DUE TO ALCOHOLIC CIRRHOSIS (HCC): ICD-10-CM

## 2019-07-11 LAB
APTT: 41.6 SEC (ref 26–36)
INR BLD: 1.59 (ref 0.86–1.14)
PROTHROMBIN TIME: 18.1 SEC (ref 9.8–13)

## 2019-07-11 PROCEDURE — 85610 PROTHROMBIN TIME: CPT

## 2019-07-11 PROCEDURE — 85730 THROMBOPLASTIN TIME PARTIAL: CPT

## 2019-07-11 PROCEDURE — 2500000003 HC RX 250 WO HCPCS: Performed by: RADIOLOGY

## 2019-07-11 PROCEDURE — 36415 COLL VENOUS BLD VENIPUNCTURE: CPT

## 2019-07-11 PROCEDURE — C1729 CATH, DRAINAGE: HCPCS

## 2019-07-11 PROCEDURE — 7100000010 HC PHASE II RECOVERY - FIRST 15 MIN

## 2019-07-11 PROCEDURE — 49083 ABD PARACENTESIS W/IMAGING: CPT

## 2019-07-11 RX ORDER — LIDOCAINE HYDROCHLORIDE 10 MG/ML
20 INJECTION, SOLUTION EPIDURAL; INFILTRATION; INTRACAUDAL; PERINEURAL ONCE
Status: COMPLETED | OUTPATIENT
Start: 2019-07-11 | End: 2019-07-11

## 2019-07-11 RX ADMIN — LIDOCAINE HYDROCHLORIDE 8 ML: 10 INJECTION, SOLUTION EPIDURAL; INFILTRATION; INTRACAUDAL; PERINEURAL at 13:40

## 2019-07-11 ASSESSMENT — PAIN SCALES - GENERAL: PAINLEVEL_OUTOF10: 0

## 2019-07-11 ASSESSMENT — PAIN - FUNCTIONAL ASSESSMENT: PAIN_FUNCTIONAL_ASSESSMENT: 0-10

## 2019-07-18 ENCOUNTER — HOSPITAL ENCOUNTER (OUTPATIENT)
Dept: ULTRASOUND IMAGING | Age: 64
Discharge: HOME | End: 2019-07-18
Payer: MEDICAID

## 2019-07-18 VITALS
DIASTOLIC BLOOD PRESSURE: 75 MMHG | OXYGEN SATURATION: 100 % | HEART RATE: 79 BPM | SYSTOLIC BLOOD PRESSURE: 127 MMHG | RESPIRATION RATE: 14 BRPM | TEMPERATURE: 97.1 F

## 2019-07-18 DIAGNOSIS — R18.8 OTHER ASCITES: ICD-10-CM

## 2019-07-18 LAB
APTT: 41.1 SEC (ref 26–36)
INR BLD: 1.66 (ref 0.86–1.14)
PROTHROMBIN TIME: 18.9 SEC (ref 9.8–13)

## 2019-07-18 PROCEDURE — 6370000000 HC RX 637 (ALT 250 FOR IP): Performed by: INTERNAL MEDICINE

## 2019-07-18 PROCEDURE — 85610 PROTHROMBIN TIME: CPT

## 2019-07-18 PROCEDURE — 2500000003 HC RX 250 WO HCPCS: Performed by: INTERNAL MEDICINE

## 2019-07-18 PROCEDURE — 85730 THROMBOPLASTIN TIME PARTIAL: CPT

## 2019-07-18 PROCEDURE — 7100000011 HC PHASE II RECOVERY - ADDTL 15 MIN

## 2019-07-18 PROCEDURE — 7100000010 HC PHASE II RECOVERY - FIRST 15 MIN

## 2019-07-18 PROCEDURE — 49083 ABD PARACENTESIS W/IMAGING: CPT

## 2019-07-18 RX ORDER — LIDOCAINE HYDROCHLORIDE 10 MG/ML
5 INJECTION, SOLUTION EPIDURAL; INFILTRATION; INTRACAUDAL; PERINEURAL ONCE
Status: COMPLETED | OUTPATIENT
Start: 2019-07-18 | End: 2019-07-18

## 2019-07-18 RX ORDER — BACITRACIN ZINC AND POLYMYXIN B SULFATE 500; 1000 [USP'U]/G; [USP'U]/G
OINTMENT TOPICAL ONCE
Status: COMPLETED | OUTPATIENT
Start: 2019-07-18 | End: 2019-07-18

## 2019-07-18 RX ORDER — ALBUMIN (HUMAN) 12.5 G/50ML
50 SOLUTION INTRAVENOUS ONCE
Status: DISCONTINUED | OUTPATIENT
Start: 2019-07-18 | End: 2019-07-18

## 2019-07-18 RX ADMIN — LIDOCAINE HYDROCHLORIDE 5 ML: 10 INJECTION, SOLUTION EPIDURAL; INFILTRATION; INTRACAUDAL; PERINEURAL at 13:56

## 2019-07-18 RX ADMIN — BACITRACIN ZINC AND POLYMYXIN B SULFATE: at 14:30

## 2019-07-25 ENCOUNTER — HOSPITAL ENCOUNTER (OUTPATIENT)
Dept: ULTRASOUND IMAGING | Age: 64
Discharge: HOME | End: 2019-07-25
Payer: MEDICAID

## 2019-07-25 ENCOUNTER — HOSPITAL ENCOUNTER (OUTPATIENT)
Age: 64
End: 2019-07-25
Payer: MEDICAID

## 2019-07-25 VITALS
HEART RATE: 92 BPM | BODY MASS INDEX: 27.73 KG/M2 | TEMPERATURE: 97 F | DIASTOLIC BLOOD PRESSURE: 67 MMHG | WEIGHT: 216 LBS | OXYGEN SATURATION: 95 % | SYSTOLIC BLOOD PRESSURE: 101 MMHG | RESPIRATION RATE: 14 BRPM

## 2019-07-25 DIAGNOSIS — R18.8 OTHER ASCITES: ICD-10-CM

## 2019-07-25 LAB
A/G RATIO: 0.4 (ref 1.1–2.2)
ALBUMIN SERPL-MCNC: 2.4 G/DL (ref 3.4–5)
ALP BLD-CCNC: 191 U/L (ref 40–129)
ALT SERPL-CCNC: 21 U/L (ref 10–40)
ANION GAP SERPL CALCULATED.3IONS-SCNC: 9 MMOL/L (ref 3–16)
APTT: 39.3 SEC (ref 26–36)
AST SERPL-CCNC: 49 U/L (ref 15–37)
BILIRUB SERPL-MCNC: 3.6 MG/DL (ref 0–1)
BUN BLDV-MCNC: 12 MG/DL (ref 7–20)
CALCIUM SERPL-MCNC: 8.3 MG/DL (ref 8.3–10.6)
CHLORIDE BLD-SCNC: 105 MMOL/L (ref 99–110)
CO2: 27 MMOL/L (ref 21–32)
CREAT SERPL-MCNC: 0.8 MG/DL (ref 0.8–1.3)
GFR AFRICAN AMERICAN: >60
GFR NON-AFRICAN AMERICAN: >60
GLOBULIN: 5.4 G/DL
GLUCOSE BLD-MCNC: 80 MG/DL (ref 70–99)
INR BLD: 1.5 (ref 0.86–1.14)
POTASSIUM SERPL-SCNC: 3.4 MMOL/L (ref 3.5–5.1)
PROTHROMBIN TIME: 17.1 SEC (ref 9.8–13)
SODIUM BLD-SCNC: 141 MMOL/L (ref 136–145)
TOTAL PROTEIN: 7.8 G/DL (ref 6.4–8.2)

## 2019-07-25 PROCEDURE — 80053 COMPREHEN METABOLIC PANEL: CPT

## 2019-07-25 PROCEDURE — 7100000010 HC PHASE II RECOVERY - FIRST 15 MIN

## 2019-07-25 PROCEDURE — 2709999900 US GUIDED PARACENTESIS

## 2019-07-25 PROCEDURE — 85730 THROMBOPLASTIN TIME PARTIAL: CPT

## 2019-07-25 PROCEDURE — 85610 PROTHROMBIN TIME: CPT

## 2019-07-25 PROCEDURE — 2500000003 HC RX 250 WO HCPCS

## 2019-07-25 PROCEDURE — 6370000000 HC RX 637 (ALT 250 FOR IP)

## 2019-07-25 PROCEDURE — 81256 HFE GENE: CPT

## 2019-07-25 RX ORDER — LIDOCAINE HYDROCHLORIDE 10 MG/ML
5 INJECTION, SOLUTION EPIDURAL; INFILTRATION; INTRACAUDAL; PERINEURAL ONCE
Status: COMPLETED | OUTPATIENT
Start: 2019-07-25 | End: 2019-07-25

## 2019-07-25 RX ORDER — BACITRACIN, NEOMYCIN, POLYMYXIN B 400; 3.5; 5 [USP'U]/G; MG/G; [USP'U]/G
OINTMENT TOPICAL ONCE
Status: COMPLETED | OUTPATIENT
Start: 2019-07-25 | End: 2019-07-25

## 2019-07-25 RX ADMIN — BACITRACIN, NEOMYCIN, POLYMYXIN B: 400; 3.5; 5 OINTMENT TOPICAL at 15:16

## 2019-07-25 RX ADMIN — LIDOCAINE HYDROCHLORIDE 5 ML: 10 INJECTION, SOLUTION EPIDURAL; INFILTRATION; INTRACAUDAL; PERINEURAL at 14:45

## 2019-07-25 ASSESSMENT — PAIN - FUNCTIONAL ASSESSMENT: PAIN_FUNCTIONAL_ASSESSMENT: 0-10

## 2019-07-25 NOTE — PROGRESS NOTES
IR Attending     S/P US Paracentesis     Drainage of 3900 mL of clear yellow ascites from the left abdomen    Local anesthesia only with 1% lidocaine    Pt tolerated well

## 2019-07-29 LAB
C282Y HEMOCHROMATOSIS MUT: NEGATIVE
H63D HEMOCHROMATOSIS MUT: NEGATIVE
HEMOCHROMATOSIS GENE ANALYSIS: NORMAL
HFE PCR SPECIMEN: NORMAL
S65C HEMOCHROMATOSIS MUT: NEGATIVE

## 2019-08-01 ENCOUNTER — HOSPITAL ENCOUNTER (OUTPATIENT)
Dept: ULTRASOUND IMAGING | Age: 64
Discharge: HOME | End: 2019-08-01

## 2019-08-01 VITALS
TEMPERATURE: 98.6 F | SYSTOLIC BLOOD PRESSURE: 112 MMHG | OXYGEN SATURATION: 97 % | HEART RATE: 78 BPM | DIASTOLIC BLOOD PRESSURE: 75 MMHG | RESPIRATION RATE: 16 BRPM

## 2019-08-01 DIAGNOSIS — R18.8 OTHER ASCITES: ICD-10-CM

## 2019-08-01 LAB
APTT: 43.1 SEC (ref 26–36)
INR BLD: 1.63 (ref 0.86–1.14)
PROTHROMBIN TIME: 18.6 SEC (ref 9.8–13)

## 2019-08-01 PROCEDURE — 36415 COLL VENOUS BLD VENIPUNCTURE: CPT

## 2019-08-01 PROCEDURE — 2500000003 HC RX 250 WO HCPCS: Performed by: INTERNAL MEDICINE

## 2019-08-01 PROCEDURE — 85610 PROTHROMBIN TIME: CPT

## 2019-08-01 PROCEDURE — 2709999900 US GUIDED PARACENTESIS

## 2019-08-01 PROCEDURE — 7100000010 HC PHASE II RECOVERY - FIRST 15 MIN

## 2019-08-01 PROCEDURE — 7100000011 HC PHASE II RECOVERY - ADDTL 15 MIN

## 2019-08-01 PROCEDURE — 85730 THROMBOPLASTIN TIME PARTIAL: CPT

## 2019-08-01 RX ORDER — ALBUMIN (HUMAN) 12.5 G/50ML
50 SOLUTION INTRAVENOUS ONCE
Status: DISCONTINUED | OUTPATIENT
Start: 2019-08-01 | End: 2019-08-02 | Stop reason: HOSPADM

## 2019-08-01 RX ORDER — LIDOCAINE HYDROCHLORIDE 10 MG/ML
5 INJECTION, SOLUTION EPIDURAL; INFILTRATION; INTRACAUDAL; PERINEURAL ONCE
Status: COMPLETED | OUTPATIENT
Start: 2019-08-01 | End: 2019-08-01

## 2019-08-01 RX ADMIN — LIDOCAINE HYDROCHLORIDE 5 ML: 10 INJECTION, SOLUTION EPIDURAL; INFILTRATION; INTRACAUDAL; PERINEURAL at 15:20

## 2019-08-01 NOTE — PROGRESS NOTES
Pt arrived to same day from radiology/ultrasound post paracentesis, site c/d/i. Pt a&o x4, VSS. Food tray ordered, will continue to monitor.

## 2019-08-08 ENCOUNTER — APPOINTMENT (OUTPATIENT)
Dept: INTERVENTIONAL RADIOLOGY/VASCULAR | Age: 64
End: 2019-08-08

## 2019-08-08 ENCOUNTER — HOSPITAL ENCOUNTER (OUTPATIENT)
Dept: INTERVENTIONAL RADIOLOGY/VASCULAR | Age: 64
Discharge: HOME | End: 2019-08-08

## 2019-08-08 VITALS
TEMPERATURE: 97 F | SYSTOLIC BLOOD PRESSURE: 112 MMHG | HEIGHT: 74 IN | OXYGEN SATURATION: 98 % | WEIGHT: 215.3 LBS | DIASTOLIC BLOOD PRESSURE: 87 MMHG | RESPIRATION RATE: 16 BRPM | BODY MASS INDEX: 27.63 KG/M2 | HEART RATE: 76 BPM

## 2019-08-08 DIAGNOSIS — R18.8 OTHER ASCITES: ICD-10-CM

## 2019-08-08 PROCEDURE — 7100000011 HC PHASE II RECOVERY - ADDTL 15 MIN

## 2019-08-08 PROCEDURE — 7100000010 HC PHASE II RECOVERY - FIRST 15 MIN

## 2019-08-08 PROCEDURE — C1729 CATH, DRAINAGE: HCPCS

## 2019-08-08 PROCEDURE — 49083 ABD PARACENTESIS W/IMAGING: CPT

## 2019-08-08 PROCEDURE — 2500000003 HC RX 250 WO HCPCS: Performed by: RADIOLOGY

## 2019-08-08 RX ORDER — LIDOCAINE HYDROCHLORIDE 10 MG/ML
20 INJECTION, SOLUTION EPIDURAL; INFILTRATION; INTRACAUDAL; PERINEURAL ONCE
Status: COMPLETED | OUTPATIENT
Start: 2019-08-08 | End: 2019-08-08

## 2019-08-08 RX ADMIN — LIDOCAINE HYDROCHLORIDE 8 ML: 10 INJECTION, SOLUTION EPIDURAL; INFILTRATION; INTRACAUDAL; PERINEURAL at 14:30

## 2019-08-15 ENCOUNTER — HOSPITAL ENCOUNTER (OUTPATIENT)
Dept: ULTRASOUND IMAGING | Age: 64
Discharge: HOME | End: 2019-08-15

## 2019-08-15 VITALS
DIASTOLIC BLOOD PRESSURE: 72 MMHG | RESPIRATION RATE: 16 BRPM | OXYGEN SATURATION: 96 % | TEMPERATURE: 96.8 F | HEART RATE: 89 BPM | SYSTOLIC BLOOD PRESSURE: 112 MMHG

## 2019-08-15 DIAGNOSIS — R18.8 OTHER ASCITES: ICD-10-CM

## 2019-08-15 LAB
APTT: 40.1 SEC (ref 26–36)
INR BLD: 1.58 (ref 0.86–1.14)
PROTHROMBIN TIME: 18 SEC (ref 9.8–13)

## 2019-08-15 PROCEDURE — 7100000010 HC PHASE II RECOVERY - FIRST 15 MIN

## 2019-08-15 PROCEDURE — 7100000011 HC PHASE II RECOVERY - ADDTL 15 MIN

## 2019-08-15 PROCEDURE — 36415 COLL VENOUS BLD VENIPUNCTURE: CPT

## 2019-08-15 PROCEDURE — 85730 THROMBOPLASTIN TIME PARTIAL: CPT

## 2019-08-15 PROCEDURE — 6370000000 HC RX 637 (ALT 250 FOR IP): Performed by: INTERNAL MEDICINE

## 2019-08-15 PROCEDURE — 49083 ABD PARACENTESIS W/IMAGING: CPT

## 2019-08-15 PROCEDURE — 85610 PROTHROMBIN TIME: CPT

## 2019-08-15 PROCEDURE — 2500000003 HC RX 250 WO HCPCS

## 2019-08-15 RX ORDER — LIDOCAINE HYDROCHLORIDE 10 MG/ML
5 INJECTION, SOLUTION EPIDURAL; INFILTRATION; INTRACAUDAL; PERINEURAL ONCE
Status: COMPLETED | OUTPATIENT
Start: 2019-08-15 | End: 2019-08-15

## 2019-08-15 RX ORDER — BACITRACIN, NEOMYCIN, POLYMYXIN B 400; 3.5; 5 [USP'U]/G; MG/G; [USP'U]/G
OINTMENT TOPICAL ONCE
Status: COMPLETED | OUTPATIENT
Start: 2019-08-15 | End: 2019-08-15

## 2019-08-15 RX ADMIN — BACITRACIN, NEOMYCIN, POLYMYXIN B: 400; 3.5; 5 OINTMENT TOPICAL at 14:15

## 2019-08-15 RX ADMIN — LIDOCAINE HYDROCHLORIDE 5 ML: 10 INJECTION, SOLUTION EPIDURAL; INFILTRATION; INTRACAUDAL; PERINEURAL at 13:42

## 2019-08-15 ASSESSMENT — PAIN - FUNCTIONAL ASSESSMENT: PAIN_FUNCTIONAL_ASSESSMENT: 0-10

## 2019-08-22 ENCOUNTER — HOSPITAL ENCOUNTER (OUTPATIENT)
Dept: INTERVENTIONAL RADIOLOGY/VASCULAR | Age: 64
Discharge: HOME | End: 2019-08-22

## 2019-08-22 VITALS
RESPIRATION RATE: 15 BRPM | DIASTOLIC BLOOD PRESSURE: 67 MMHG | SYSTOLIC BLOOD PRESSURE: 110 MMHG | OXYGEN SATURATION: 95 % | HEART RATE: 71 BPM | TEMPERATURE: 97.5 F

## 2019-08-22 DIAGNOSIS — R18.8 OTHER ASCITES: ICD-10-CM

## 2019-08-22 LAB
APTT: 39.8 SEC (ref 26–36)
INR BLD: 1.49 (ref 0.86–1.14)
PROTHROMBIN TIME: 17 SEC (ref 9.8–13)

## 2019-08-22 PROCEDURE — 2500000003 HC RX 250 WO HCPCS: Performed by: RADIOLOGY

## 2019-08-22 PROCEDURE — 36415 COLL VENOUS BLD VENIPUNCTURE: CPT

## 2019-08-22 PROCEDURE — 85610 PROTHROMBIN TIME: CPT

## 2019-08-22 PROCEDURE — 7100000010 HC PHASE II RECOVERY - FIRST 15 MIN

## 2019-08-22 PROCEDURE — 7100000011 HC PHASE II RECOVERY - ADDTL 15 MIN

## 2019-08-22 PROCEDURE — 85730 THROMBOPLASTIN TIME PARTIAL: CPT

## 2019-08-22 PROCEDURE — 49083 ABD PARACENTESIS W/IMAGING: CPT

## 2019-08-22 RX ORDER — ALBUMIN (HUMAN) 12.5 G/50ML
50 SOLUTION INTRAVENOUS ONCE
Status: DISCONTINUED | OUTPATIENT
Start: 2019-08-22 | End: 2019-08-23 | Stop reason: HOSPADM

## 2019-08-22 RX ORDER — ACETAMINOPHEN 325 MG/1
650 TABLET ORAL EVERY 4 HOURS PRN
Status: DISCONTINUED | OUTPATIENT
Start: 2019-08-22 | End: 2019-08-23 | Stop reason: HOSPADM

## 2019-08-22 RX ORDER — LIDOCAINE HYDROCHLORIDE 10 MG/ML
20 INJECTION, SOLUTION EPIDURAL; INFILTRATION; INTRACAUDAL; PERINEURAL ONCE
Status: COMPLETED | OUTPATIENT
Start: 2019-08-22 | End: 2019-08-22

## 2019-08-22 RX ADMIN — LIDOCAINE HYDROCHLORIDE 8 ML: 10 INJECTION, SOLUTION EPIDURAL; INFILTRATION; INTRACAUDAL; PERINEURAL at 14:00

## 2019-08-29 ENCOUNTER — HOSPITAL ENCOUNTER (OUTPATIENT)
Dept: ULTRASOUND IMAGING | Age: 64
Discharge: HOME | End: 2019-08-29

## 2019-08-29 VITALS
TEMPERATURE: 96.8 F | RESPIRATION RATE: 18 BRPM | WEIGHT: 216 LBS | OXYGEN SATURATION: 92 % | BODY MASS INDEX: 29.26 KG/M2 | HEART RATE: 96 BPM | DIASTOLIC BLOOD PRESSURE: 77 MMHG | HEIGHT: 72 IN | SYSTOLIC BLOOD PRESSURE: 112 MMHG

## 2019-08-29 DIAGNOSIS — R18.8 OTHER ASCITES: ICD-10-CM

## 2019-08-29 LAB
APTT: 39.4 SEC (ref 26–36)
INR BLD: 1.39 (ref 0.86–1.14)
PROTHROMBIN TIME: 15.9 SEC (ref 9.8–13)

## 2019-08-29 PROCEDURE — 7100000011 HC PHASE II RECOVERY - ADDTL 15 MIN

## 2019-08-29 PROCEDURE — 6370000000 HC RX 637 (ALT 250 FOR IP): Performed by: INTERNAL MEDICINE

## 2019-08-29 PROCEDURE — 7100000010 HC PHASE II RECOVERY - FIRST 15 MIN

## 2019-08-29 PROCEDURE — 49083 ABD PARACENTESIS W/IMAGING: CPT

## 2019-08-29 PROCEDURE — 85610 PROTHROMBIN TIME: CPT

## 2019-08-29 PROCEDURE — 2500000003 HC RX 250 WO HCPCS: Performed by: INTERNAL MEDICINE

## 2019-08-29 PROCEDURE — 85730 THROMBOPLASTIN TIME PARTIAL: CPT

## 2019-08-29 RX ORDER — LIDOCAINE HYDROCHLORIDE 10 MG/ML
5 INJECTION, SOLUTION EPIDURAL; INFILTRATION; INTRACAUDAL; PERINEURAL ONCE
Status: COMPLETED | OUTPATIENT
Start: 2019-08-29 | End: 2019-08-29

## 2019-08-29 RX ADMIN — LIDOCAINE HYDROCHLORIDE 5 ML: 10 INJECTION, SOLUTION EPIDURAL; INFILTRATION; INTRACAUDAL; PERINEURAL at 13:15

## 2019-08-29 RX ADMIN — NEOMYCIN AND POLYMYXIN B SULFATES, BACITRACIN ZINC, AND HYDROCORTISONE: 3.5; 5000; 400; 1 OINTMENT TOPICAL at 14:15

## 2019-08-29 ASSESSMENT — PAIN - FUNCTIONAL ASSESSMENT: PAIN_FUNCTIONAL_ASSESSMENT: 0-10

## 2019-09-05 ENCOUNTER — HOSPITAL ENCOUNTER (OUTPATIENT)
Dept: INTERVENTIONAL RADIOLOGY/VASCULAR | Age: 64
Discharge: HOME | End: 2019-09-05

## 2019-09-05 VITALS
OXYGEN SATURATION: 97 % | RESPIRATION RATE: 18 BRPM | DIASTOLIC BLOOD PRESSURE: 79 MMHG | SYSTOLIC BLOOD PRESSURE: 120 MMHG | TEMPERATURE: 97 F | HEART RATE: 84 BPM

## 2019-09-05 DIAGNOSIS — K70.31 ASCITES DUE TO ALCOHOLIC CIRRHOSIS (HCC): ICD-10-CM

## 2019-09-05 LAB
APTT: 39.8 SEC (ref 26–36)
INR BLD: 1.42 (ref 0.86–1.14)
PROTHROMBIN TIME: 16.2 SEC (ref 9.8–13)

## 2019-09-05 PROCEDURE — 7100000010 HC PHASE II RECOVERY - FIRST 15 MIN

## 2019-09-05 PROCEDURE — 7100000011 HC PHASE II RECOVERY - ADDTL 15 MIN

## 2019-09-05 PROCEDURE — 2500000003 HC RX 250 WO HCPCS: Performed by: RADIOLOGY

## 2019-09-05 PROCEDURE — C1729 CATH, DRAINAGE: HCPCS

## 2019-09-05 PROCEDURE — 85610 PROTHROMBIN TIME: CPT

## 2019-09-05 PROCEDURE — 36415 COLL VENOUS BLD VENIPUNCTURE: CPT

## 2019-09-05 PROCEDURE — 85730 THROMBOPLASTIN TIME PARTIAL: CPT

## 2019-09-05 RX ORDER — LIDOCAINE HYDROCHLORIDE 10 MG/ML
20 INJECTION, SOLUTION EPIDURAL; INFILTRATION; INTRACAUDAL; PERINEURAL ONCE
Status: COMPLETED | OUTPATIENT
Start: 2019-09-05 | End: 2019-09-05

## 2019-09-05 RX ADMIN — LIDOCAINE HYDROCHLORIDE 8 ML: 10 INJECTION, SOLUTION EPIDURAL; INFILTRATION; INTRACAUDAL; PERINEURAL at 12:35

## 2019-09-12 ENCOUNTER — HOSPITAL ENCOUNTER (OUTPATIENT)
Dept: ULTRASOUND IMAGING | Age: 64
Discharge: HOME | End: 2019-09-12

## 2019-09-12 VITALS
HEIGHT: 74 IN | HEART RATE: 81 BPM | SYSTOLIC BLOOD PRESSURE: 114 MMHG | TEMPERATURE: 98.2 F | DIASTOLIC BLOOD PRESSURE: 72 MMHG | OXYGEN SATURATION: 97 % | RESPIRATION RATE: 16 BRPM | BODY MASS INDEX: 26.95 KG/M2 | WEIGHT: 210 LBS

## 2019-09-12 DIAGNOSIS — R18.8 OTHER ASCITES: ICD-10-CM

## 2019-09-12 LAB
APTT: 40.9 SEC (ref 26–36)
INR BLD: 1.54 (ref 0.86–1.14)
PROTHROMBIN TIME: 17.5 SEC (ref 9.8–13)

## 2019-09-12 PROCEDURE — 6370000000 HC RX 637 (ALT 250 FOR IP): Performed by: INTERNAL MEDICINE

## 2019-09-12 PROCEDURE — 2500000003 HC RX 250 WO HCPCS: Performed by: INTERNAL MEDICINE

## 2019-09-12 PROCEDURE — 85610 PROTHROMBIN TIME: CPT

## 2019-09-12 PROCEDURE — 7100000011 HC PHASE II RECOVERY - ADDTL 15 MIN

## 2019-09-12 PROCEDURE — 49083 ABD PARACENTESIS W/IMAGING: CPT

## 2019-09-12 PROCEDURE — 7100000010 HC PHASE II RECOVERY - FIRST 15 MIN

## 2019-09-12 PROCEDURE — 85730 THROMBOPLASTIN TIME PARTIAL: CPT

## 2019-09-12 PROCEDURE — 36415 COLL VENOUS BLD VENIPUNCTURE: CPT

## 2019-09-12 RX ORDER — ALBUMIN (HUMAN) 12.5 G/50ML
50 SOLUTION INTRAVENOUS ONCE
Status: DISCONTINUED | OUTPATIENT
Start: 2019-09-12 | End: 2019-09-13 | Stop reason: HOSPADM

## 2019-09-12 RX ORDER — BACITRACIN, NEOMYCIN, POLYMYXIN B 400; 3.5; 5 [USP'U]/G; MG/G; [USP'U]/G
OINTMENT TOPICAL ONCE
Status: COMPLETED | OUTPATIENT
Start: 2019-09-12 | End: 2019-09-12

## 2019-09-12 RX ORDER — LIDOCAINE HYDROCHLORIDE 10 MG/ML
5 INJECTION, SOLUTION EPIDURAL; INFILTRATION; INTRACAUDAL; PERINEURAL ONCE
Status: COMPLETED | OUTPATIENT
Start: 2019-09-12 | End: 2019-09-12

## 2019-09-12 RX ADMIN — LIDOCAINE HYDROCHLORIDE 5 ML: 10 INJECTION, SOLUTION EPIDURAL; INFILTRATION; INTRACAUDAL; PERINEURAL at 13:00

## 2019-09-12 RX ADMIN — BACITRACIN, NEOMYCIN, POLYMYXIN B: 400; 3.5; 5 OINTMENT TOPICAL at 13:30

## 2019-09-12 ASSESSMENT — PAIN - FUNCTIONAL ASSESSMENT: PAIN_FUNCTIONAL_ASSESSMENT: 0-10

## 2019-09-19 ENCOUNTER — HOSPITAL ENCOUNTER (OUTPATIENT)
Dept: INTERVENTIONAL RADIOLOGY/VASCULAR | Age: 64
Discharge: HOME | End: 2019-09-19

## 2019-09-19 VITALS
OXYGEN SATURATION: 98 % | TEMPERATURE: 97 F | RESPIRATION RATE: 16 BRPM | SYSTOLIC BLOOD PRESSURE: 118 MMHG | DIASTOLIC BLOOD PRESSURE: 78 MMHG | HEART RATE: 90 BPM

## 2019-09-19 DIAGNOSIS — K70.31 ASCITES DUE TO ALCOHOLIC CIRRHOSIS (HCC): ICD-10-CM

## 2019-09-19 LAB
APTT: 42.2 SEC (ref 26–36)
INR BLD: 1.49 (ref 0.86–1.14)
PROTHROMBIN TIME: 17 SEC (ref 9.8–13)

## 2019-09-19 PROCEDURE — 85730 THROMBOPLASTIN TIME PARTIAL: CPT

## 2019-09-19 PROCEDURE — 85610 PROTHROMBIN TIME: CPT

## 2019-09-19 PROCEDURE — C1729 CATH, DRAINAGE: HCPCS

## 2019-09-19 PROCEDURE — 36415 COLL VENOUS BLD VENIPUNCTURE: CPT

## 2019-09-19 PROCEDURE — 49083 ABD PARACENTESIS W/IMAGING: CPT

## 2019-09-19 PROCEDURE — 7100000010 HC PHASE II RECOVERY - FIRST 15 MIN

## 2019-09-19 PROCEDURE — 2500000003 HC RX 250 WO HCPCS: Performed by: RADIOLOGY

## 2019-09-19 RX ORDER — LIDOCAINE HYDROCHLORIDE 10 MG/ML
10 INJECTION, SOLUTION EPIDURAL; INFILTRATION; INTRACAUDAL; PERINEURAL ONCE
Status: COMPLETED | OUTPATIENT
Start: 2019-09-19 | End: 2019-09-19

## 2019-09-19 RX ADMIN — LIDOCAINE HYDROCHLORIDE 10 ML: 10 INJECTION, SOLUTION EPIDURAL; INFILTRATION; INTRACAUDAL; PERINEURAL at 13:41

## 2019-09-26 ENCOUNTER — HOSPITAL ENCOUNTER (OUTPATIENT)
Dept: ULTRASOUND IMAGING | Age: 64
Discharge: HOME | End: 2019-09-26

## 2019-09-26 VITALS
OXYGEN SATURATION: 94 % | HEART RATE: 95 BPM | TEMPERATURE: 97.7 F | WEIGHT: 217 LBS | RESPIRATION RATE: 18 BRPM | BODY MASS INDEX: 27.86 KG/M2 | SYSTOLIC BLOOD PRESSURE: 127 MMHG | DIASTOLIC BLOOD PRESSURE: 79 MMHG

## 2019-09-26 DIAGNOSIS — R18.8 OTHER ASCITES: ICD-10-CM

## 2019-09-26 LAB
APTT: 40.9 SEC (ref 26–36)
INR BLD: 1.45 (ref 0.86–1.14)
PROTHROMBIN TIME: 16.5 SEC (ref 9.8–13)

## 2019-09-26 PROCEDURE — 7100000011 HC PHASE II RECOVERY - ADDTL 15 MIN

## 2019-09-26 PROCEDURE — 36415 COLL VENOUS BLD VENIPUNCTURE: CPT

## 2019-09-26 PROCEDURE — 85730 THROMBOPLASTIN TIME PARTIAL: CPT

## 2019-09-26 PROCEDURE — 7100000010 HC PHASE II RECOVERY - FIRST 15 MIN

## 2019-09-26 PROCEDURE — 85610 PROTHROMBIN TIME: CPT

## 2019-09-26 PROCEDURE — 49083 ABD PARACENTESIS W/IMAGING: CPT

## 2019-09-26 PROCEDURE — 6370000000 HC RX 637 (ALT 250 FOR IP): Performed by: INTERNAL MEDICINE

## 2019-09-26 PROCEDURE — 2500000003 HC RX 250 WO HCPCS: Performed by: INTERNAL MEDICINE

## 2019-09-26 RX ORDER — LIDOCAINE HYDROCHLORIDE 10 MG/ML
5 INJECTION, SOLUTION EPIDURAL; INFILTRATION; INTRACAUDAL; PERINEURAL ONCE
Status: COMPLETED | OUTPATIENT
Start: 2019-09-26 | End: 2019-09-26

## 2019-09-26 RX ADMIN — NEOMYCIN AND POLYMYXIN B SULFATES, BACITRACIN ZINC, AND HYDROCORTISONE: 3.5; 5000; 400; 1 OINTMENT TOPICAL at 14:35

## 2019-09-26 RX ADMIN — LIDOCAINE HYDROCHLORIDE 5 ML: 10 INJECTION, SOLUTION EPIDURAL; INFILTRATION; INTRACAUDAL; PERINEURAL at 13:15

## 2019-10-03 ENCOUNTER — HOSPITAL ENCOUNTER (OUTPATIENT)
Dept: INTERVENTIONAL RADIOLOGY/VASCULAR | Age: 64
Discharge: HOME | End: 2019-10-03

## 2019-10-03 VITALS
HEIGHT: 74 IN | DIASTOLIC BLOOD PRESSURE: 76 MMHG | TEMPERATURE: 98.6 F | RESPIRATION RATE: 18 BRPM | WEIGHT: 220 LBS | OXYGEN SATURATION: 98 % | SYSTOLIC BLOOD PRESSURE: 121 MMHG | BODY MASS INDEX: 28.23 KG/M2 | HEART RATE: 90 BPM

## 2019-10-03 DIAGNOSIS — K70.31 ASCITES DUE TO ALCOHOLIC CIRRHOSIS (HCC): ICD-10-CM

## 2019-10-03 LAB
APTT: 43.5 SEC (ref 26–36)
INR BLD: 1.59 (ref 0.86–1.14)
PROTHROMBIN TIME: 18.1 SEC (ref 9.8–13)

## 2019-10-03 PROCEDURE — 36415 COLL VENOUS BLD VENIPUNCTURE: CPT

## 2019-10-03 PROCEDURE — 7100000010 HC PHASE II RECOVERY - FIRST 15 MIN

## 2019-10-03 PROCEDURE — 2500000003 HC RX 250 WO HCPCS: Performed by: RADIOLOGY

## 2019-10-03 PROCEDURE — 85610 PROTHROMBIN TIME: CPT

## 2019-10-03 PROCEDURE — 85730 THROMBOPLASTIN TIME PARTIAL: CPT

## 2019-10-03 PROCEDURE — C1729 CATH, DRAINAGE: HCPCS

## 2019-10-03 PROCEDURE — 49083 ABD PARACENTESIS W/IMAGING: CPT

## 2019-10-03 RX ORDER — LIDOCAINE HYDROCHLORIDE 10 MG/ML
5 INJECTION, SOLUTION EPIDURAL; INFILTRATION; INTRACAUDAL; PERINEURAL ONCE
Status: COMPLETED | OUTPATIENT
Start: 2019-10-03 | End: 2019-10-03

## 2019-10-03 RX ADMIN — LIDOCAINE HYDROCHLORIDE 5 ML: 10 INJECTION, SOLUTION EPIDURAL; INFILTRATION; INTRACAUDAL; PERINEURAL at 13:16

## 2019-10-03 ASSESSMENT — PAIN - FUNCTIONAL ASSESSMENT: PAIN_FUNCTIONAL_ASSESSMENT: 0-10

## 2019-10-10 ENCOUNTER — HOSPITAL ENCOUNTER (OUTPATIENT)
Dept: ULTRASOUND IMAGING | Age: 64
Discharge: HOME | End: 2019-10-10

## 2019-10-10 VITALS
RESPIRATION RATE: 16 BRPM | BODY MASS INDEX: 29.16 KG/M2 | SYSTOLIC BLOOD PRESSURE: 110 MMHG | WEIGHT: 227.1 LBS | TEMPERATURE: 97 F | OXYGEN SATURATION: 98 % | DIASTOLIC BLOOD PRESSURE: 74 MMHG | HEART RATE: 91 BPM

## 2019-10-10 DIAGNOSIS — R18.8 OTHER ASCITES: ICD-10-CM

## 2019-10-10 LAB
APTT: 37.8 SEC (ref 26–36)
INR BLD: 1.41 (ref 0.86–1.14)
PROTHROMBIN TIME: 16.1 SEC (ref 9.8–13)

## 2019-10-10 PROCEDURE — 7100000010 HC PHASE II RECOVERY - FIRST 15 MIN

## 2019-10-10 PROCEDURE — 7100000011 HC PHASE II RECOVERY - ADDTL 15 MIN

## 2019-10-10 PROCEDURE — 49083 ABD PARACENTESIS W/IMAGING: CPT

## 2019-10-10 PROCEDURE — 85610 PROTHROMBIN TIME: CPT

## 2019-10-10 PROCEDURE — 36415 COLL VENOUS BLD VENIPUNCTURE: CPT

## 2019-10-10 PROCEDURE — 2500000003 HC RX 250 WO HCPCS: Performed by: INTERNAL MEDICINE

## 2019-10-10 PROCEDURE — 6370000000 HC RX 637 (ALT 250 FOR IP): Performed by: RADIOLOGY

## 2019-10-10 PROCEDURE — 85730 THROMBOPLASTIN TIME PARTIAL: CPT

## 2019-10-10 PROCEDURE — 6370000000 HC RX 637 (ALT 250 FOR IP): Performed by: INTERNAL MEDICINE

## 2019-10-10 RX ORDER — HYDROCODONE BITARTRATE AND ACETAMINOPHEN 5; 325 MG/1; MG/1
2 TABLET ORAL ONCE
Status: COMPLETED | OUTPATIENT
Start: 2019-10-10 | End: 2019-10-10

## 2019-10-10 RX ORDER — LIDOCAINE HYDROCHLORIDE 10 MG/ML
5 INJECTION, SOLUTION EPIDURAL; INFILTRATION; INTRACAUDAL; PERINEURAL ONCE
Status: COMPLETED | OUTPATIENT
Start: 2019-10-10 | End: 2019-10-10

## 2019-10-10 RX ORDER — BACITRACIN, NEOMYCIN, POLYMYXIN B 400; 3.5; 5 [USP'U]/G; MG/G; [USP'U]/G
OINTMENT TOPICAL ONCE
Status: COMPLETED | OUTPATIENT
Start: 2019-10-10 | End: 2019-10-10

## 2019-10-10 RX ADMIN — BACITRACIN, NEOMYCIN, POLYMYXIN B: 400; 3.5; 5 OINTMENT TOPICAL at 14:02

## 2019-10-10 RX ADMIN — LIDOCAINE HYDROCHLORIDE 5 ML: 10 INJECTION, SOLUTION EPIDURAL; INFILTRATION; INTRACAUDAL; PERINEURAL at 13:15

## 2019-10-10 RX ADMIN — HYDROCODONE BITARTRATE AND ACETAMINOPHEN 2 TABLET: 5; 325 TABLET ORAL at 14:09

## 2019-10-10 ASSESSMENT — PAIN SCALES - GENERAL: PAINLEVEL_OUTOF10: 9

## 2019-10-17 ENCOUNTER — HOSPITAL ENCOUNTER (OUTPATIENT)
Dept: INTERVENTIONAL RADIOLOGY/VASCULAR | Age: 64
Discharge: HOME | End: 2019-10-17

## 2019-10-17 VITALS
RESPIRATION RATE: 18 BRPM | BODY MASS INDEX: 28.76 KG/M2 | HEART RATE: 95 BPM | SYSTOLIC BLOOD PRESSURE: 128 MMHG | WEIGHT: 224 LBS | TEMPERATURE: 97 F | DIASTOLIC BLOOD PRESSURE: 88 MMHG | OXYGEN SATURATION: 98 %

## 2019-10-17 DIAGNOSIS — K70.31 ASCITES DUE TO ALCOHOLIC CIRRHOSIS (HCC): ICD-10-CM

## 2019-10-17 LAB
APTT: 39.7 SEC (ref 26–36)
INR BLD: 1.38 (ref 0.86–1.14)
PROTHROMBIN TIME: 15.7 SEC (ref 9.8–13)

## 2019-10-17 PROCEDURE — 49083 ABD PARACENTESIS W/IMAGING: CPT

## 2019-10-17 PROCEDURE — 85730 THROMBOPLASTIN TIME PARTIAL: CPT

## 2019-10-17 PROCEDURE — 85610 PROTHROMBIN TIME: CPT

## 2019-10-17 PROCEDURE — 7100000011 HC PHASE II RECOVERY - ADDTL 15 MIN

## 2019-10-17 PROCEDURE — 7100000010 HC PHASE II RECOVERY - FIRST 15 MIN

## 2019-10-17 PROCEDURE — C1729 CATH, DRAINAGE: HCPCS

## 2019-10-17 PROCEDURE — 2500000003 HC RX 250 WO HCPCS: Performed by: RADIOLOGY

## 2019-10-17 RX ORDER — LIDOCAINE HYDROCHLORIDE 10 MG/ML
20 INJECTION, SOLUTION EPIDURAL; INFILTRATION; INTRACAUDAL; PERINEURAL ONCE
Status: COMPLETED | OUTPATIENT
Start: 2019-10-17 | End: 2019-10-17

## 2019-10-17 RX ADMIN — LIDOCAINE HYDROCHLORIDE 8 ML: 10 INJECTION, SOLUTION EPIDURAL; INFILTRATION; INTRACAUDAL; PERINEURAL at 12:10

## 2019-10-17 ASSESSMENT — PAIN - FUNCTIONAL ASSESSMENT: PAIN_FUNCTIONAL_ASSESSMENT: 0-10

## 2019-10-24 ENCOUNTER — HOSPITAL ENCOUNTER (OUTPATIENT)
Dept: ULTRASOUND IMAGING | Age: 64
Discharge: HOME | End: 2019-10-24

## 2019-10-24 VITALS
HEART RATE: 78 BPM | TEMPERATURE: 97.5 F | RESPIRATION RATE: 16 BRPM | OXYGEN SATURATION: 95 % | DIASTOLIC BLOOD PRESSURE: 72 MMHG | SYSTOLIC BLOOD PRESSURE: 124 MMHG

## 2019-10-24 DIAGNOSIS — R18.8 OTHER ASCITES: ICD-10-CM

## 2019-10-24 LAB
APTT: 39.1 SEC (ref 26–36)
INR BLD: 1.26 (ref 0.86–1.14)
PROTHROMBIN TIME: 14.4 SEC (ref 9.8–13)

## 2019-10-24 PROCEDURE — 6370000000 HC RX 637 (ALT 250 FOR IP): Performed by: INTERNAL MEDICINE

## 2019-10-24 PROCEDURE — 36415 COLL VENOUS BLD VENIPUNCTURE: CPT

## 2019-10-24 PROCEDURE — 85610 PROTHROMBIN TIME: CPT

## 2019-10-24 PROCEDURE — 2500000003 HC RX 250 WO HCPCS: Performed by: INTERNAL MEDICINE

## 2019-10-24 PROCEDURE — 85730 THROMBOPLASTIN TIME PARTIAL: CPT

## 2019-10-24 PROCEDURE — 7100000010 HC PHASE II RECOVERY - FIRST 15 MIN

## 2019-10-24 PROCEDURE — 49083 ABD PARACENTESIS W/IMAGING: CPT

## 2019-10-24 RX ORDER — LIDOCAINE HYDROCHLORIDE 10 MG/ML
5 INJECTION, SOLUTION EPIDURAL; INFILTRATION; INTRACAUDAL; PERINEURAL ONCE
Status: COMPLETED | OUTPATIENT
Start: 2019-10-24 | End: 2019-10-24

## 2019-10-24 RX ADMIN — LIDOCAINE HYDROCHLORIDE 5 ML: 10 INJECTION, SOLUTION EPIDURAL; INFILTRATION; INTRACAUDAL; PERINEURAL at 13:15

## 2019-10-24 RX ADMIN — NEOMYCIN AND POLYMYXIN B SULFATES, BACITRACIN ZINC, AND HYDROCORTISONE: 3.5; 5000; 400; 1 OINTMENT TOPICAL at 14:10

## 2019-10-31 ENCOUNTER — HOSPITAL ENCOUNTER (OUTPATIENT)
Dept: INTERVENTIONAL RADIOLOGY/VASCULAR | Age: 64
Discharge: HOME | End: 2019-10-31

## 2019-10-31 VITALS
SYSTOLIC BLOOD PRESSURE: 113 MMHG | RESPIRATION RATE: 18 BRPM | DIASTOLIC BLOOD PRESSURE: 80 MMHG | OXYGEN SATURATION: 97 % | TEMPERATURE: 97 F | WEIGHT: 208.38 LBS | HEIGHT: 74 IN | HEART RATE: 87 BPM | BODY MASS INDEX: 26.74 KG/M2

## 2019-10-31 DIAGNOSIS — K70.31 ASCITES DUE TO ALCOHOLIC CIRRHOSIS (HCC): ICD-10-CM

## 2019-10-31 LAB
APTT: 39.4 SEC (ref 26–36)
INR BLD: 1.47 (ref 0.86–1.14)
PROTHROMBIN TIME: 16.8 SEC (ref 9.8–13)

## 2019-10-31 PROCEDURE — 7100000011 HC PHASE II RECOVERY - ADDTL 15 MIN

## 2019-10-31 PROCEDURE — C1729 CATH, DRAINAGE: HCPCS

## 2019-10-31 PROCEDURE — 2500000003 HC RX 250 WO HCPCS: Performed by: RADIOLOGY

## 2019-10-31 PROCEDURE — 49083 ABD PARACENTESIS W/IMAGING: CPT

## 2019-10-31 PROCEDURE — 7100000010 HC PHASE II RECOVERY - FIRST 15 MIN

## 2019-10-31 PROCEDURE — 85610 PROTHROMBIN TIME: CPT

## 2019-10-31 PROCEDURE — 85730 THROMBOPLASTIN TIME PARTIAL: CPT

## 2019-10-31 PROCEDURE — 36415 COLL VENOUS BLD VENIPUNCTURE: CPT

## 2019-10-31 RX ORDER — LIDOCAINE HYDROCHLORIDE 10 MG/ML
20 INJECTION, SOLUTION EPIDURAL; INFILTRATION; INTRACAUDAL; PERINEURAL ONCE
Status: COMPLETED | OUTPATIENT
Start: 2019-10-31 | End: 2019-10-31

## 2019-10-31 RX ORDER — ALBUMIN (HUMAN) 12.5 G/50ML
50 SOLUTION INTRAVENOUS ONCE
Status: DISCONTINUED | OUTPATIENT
Start: 2019-10-31 | End: 2019-11-01 | Stop reason: HOSPADM

## 2019-10-31 RX ADMIN — LIDOCAINE HYDROCHLORIDE 8 ML: 10 INJECTION, SOLUTION EPIDURAL; INFILTRATION; INTRACAUDAL; PERINEURAL at 12:30

## 2019-10-31 ASSESSMENT — PAIN SCALES - GENERAL: PAINLEVEL_OUTOF10: 0

## 2019-11-07 ENCOUNTER — HOSPITAL ENCOUNTER (OUTPATIENT)
Dept: ULTRASOUND IMAGING | Age: 64
Discharge: HOME | End: 2019-11-07

## 2019-11-07 VITALS
BODY MASS INDEX: 26.83 KG/M2 | SYSTOLIC BLOOD PRESSURE: 115 MMHG | HEART RATE: 83 BPM | DIASTOLIC BLOOD PRESSURE: 76 MMHG | TEMPERATURE: 97.2 F | WEIGHT: 209 LBS | RESPIRATION RATE: 16 BRPM | OXYGEN SATURATION: 98 %

## 2019-11-07 DIAGNOSIS — R18.8 OTHER ASCITES: ICD-10-CM

## 2019-11-07 LAB
APTT: 24.8 SEC (ref 26–36)
INR BLD: 2.17 (ref 0.86–1.14)
PROTHROMBIN TIME: 24.7 SEC (ref 9.8–13)

## 2019-11-07 PROCEDURE — 49083 ABD PARACENTESIS W/IMAGING: CPT

## 2019-11-07 PROCEDURE — 85730 THROMBOPLASTIN TIME PARTIAL: CPT

## 2019-11-07 PROCEDURE — 85610 PROTHROMBIN TIME: CPT

## 2019-11-07 PROCEDURE — 36415 COLL VENOUS BLD VENIPUNCTURE: CPT

## 2019-11-07 PROCEDURE — 6370000000 HC RX 637 (ALT 250 FOR IP): Performed by: INTERNAL MEDICINE

## 2019-11-07 PROCEDURE — 7100000011 HC PHASE II RECOVERY - ADDTL 15 MIN

## 2019-11-07 PROCEDURE — 7100000010 HC PHASE II RECOVERY - FIRST 15 MIN

## 2019-11-07 PROCEDURE — 2500000003 HC RX 250 WO HCPCS: Performed by: INTERNAL MEDICINE

## 2019-11-07 RX ORDER — LIDOCAINE HYDROCHLORIDE 10 MG/ML
5 INJECTION, SOLUTION EPIDURAL; INFILTRATION; INTRACAUDAL; PERINEURAL ONCE
Status: COMPLETED | OUTPATIENT
Start: 2019-11-07 | End: 2019-11-07

## 2019-11-07 RX ADMIN — LIDOCAINE HYDROCHLORIDE 5 ML: 10 INJECTION, SOLUTION EPIDURAL; INFILTRATION; INTRACAUDAL; PERINEURAL at 13:10

## 2019-11-07 RX ADMIN — Medication: at 13:40

## 2019-11-07 ASSESSMENT — PAIN - FUNCTIONAL ASSESSMENT: PAIN_FUNCTIONAL_ASSESSMENT: 0-10

## 2019-11-14 ENCOUNTER — HOSPITAL ENCOUNTER (OUTPATIENT)
Dept: ULTRASOUND IMAGING | Age: 64
Discharge: HOME | End: 2019-11-14

## 2019-11-14 ENCOUNTER — HOSPITAL ENCOUNTER (OUTPATIENT)
Dept: INTERVENTIONAL RADIOLOGY/VASCULAR | Age: 64
Discharge: HOME | End: 2019-11-14

## 2019-11-14 VITALS
DIASTOLIC BLOOD PRESSURE: 83 MMHG | SYSTOLIC BLOOD PRESSURE: 129 MMHG | OXYGEN SATURATION: 98 % | HEART RATE: 84 BPM | RESPIRATION RATE: 18 BRPM | TEMPERATURE: 97 F

## 2019-11-14 DIAGNOSIS — K70.31 ASCITES DUE TO ALCOHOLIC CIRRHOSIS (HCC): ICD-10-CM

## 2019-11-14 PROCEDURE — 7100000010 HC PHASE II RECOVERY - FIRST 15 MIN

## 2019-11-14 PROCEDURE — 49083 ABD PARACENTESIS W/IMAGING: CPT

## 2019-11-14 PROCEDURE — 2500000003 HC RX 250 WO HCPCS: Performed by: RADIOLOGY

## 2019-11-14 PROCEDURE — C1729 CATH, DRAINAGE: HCPCS

## 2019-11-14 RX ORDER — LIDOCAINE HYDROCHLORIDE 10 MG/ML
5 INJECTION, SOLUTION EPIDURAL; INFILTRATION; INTRACAUDAL; PERINEURAL ONCE
Status: COMPLETED | OUTPATIENT
Start: 2019-11-14 | End: 2019-11-14

## 2019-11-14 RX ADMIN — LIDOCAINE HYDROCHLORIDE 5 ML: 10 INJECTION, SOLUTION EPIDURAL; INFILTRATION; INTRACAUDAL; PERINEURAL at 13:23

## 2019-11-21 ENCOUNTER — HOSPITAL ENCOUNTER (OUTPATIENT)
Dept: ULTRASOUND IMAGING | Age: 64
Discharge: HOME | End: 2019-11-21

## 2019-11-21 VITALS
HEART RATE: 79 BPM | WEIGHT: 205 LBS | HEIGHT: 74 IN | RESPIRATION RATE: 18 BRPM | TEMPERATURE: 98.1 F | SYSTOLIC BLOOD PRESSURE: 125 MMHG | BODY MASS INDEX: 26.31 KG/M2 | DIASTOLIC BLOOD PRESSURE: 75 MMHG | OXYGEN SATURATION: 99 %

## 2019-11-21 DIAGNOSIS — R18.8 OTHER ASCITES: ICD-10-CM

## 2019-11-21 LAB
APTT: 41.7 SEC (ref 24.2–36.2)
INR BLD: 1.37 (ref 0.86–1.14)
PROTHROMBIN TIME: 15.9 SEC (ref 10–13.2)

## 2019-11-21 PROCEDURE — 7100000010 HC PHASE II RECOVERY - FIRST 15 MIN

## 2019-11-21 PROCEDURE — 85730 THROMBOPLASTIN TIME PARTIAL: CPT

## 2019-11-21 PROCEDURE — 85610 PROTHROMBIN TIME: CPT

## 2019-11-21 PROCEDURE — 49083 ABD PARACENTESIS W/IMAGING: CPT

## 2019-11-21 PROCEDURE — 2500000003 HC RX 250 WO HCPCS: Performed by: INTERNAL MEDICINE

## 2019-11-21 PROCEDURE — 36415 COLL VENOUS BLD VENIPUNCTURE: CPT

## 2019-11-21 RX ORDER — LIDOCAINE HYDROCHLORIDE 10 MG/ML
5 INJECTION, SOLUTION EPIDURAL; INFILTRATION; INTRACAUDAL; PERINEURAL ONCE
Status: COMPLETED | OUTPATIENT
Start: 2019-11-21 | End: 2019-11-21

## 2019-11-21 RX ORDER — ACETAMINOPHEN 325 MG/1
650 TABLET ORAL EVERY 4 HOURS PRN
Status: DISCONTINUED | OUTPATIENT
Start: 2019-11-21 | End: 2019-11-22 | Stop reason: HOSPADM

## 2019-11-21 RX ADMIN — LIDOCAINE HYDROCHLORIDE 5 ML: 10 INJECTION, SOLUTION EPIDURAL; INFILTRATION; INTRACAUDAL; PERINEURAL at 13:00

## 2019-11-21 ASSESSMENT — PAIN - FUNCTIONAL ASSESSMENT: PAIN_FUNCTIONAL_ASSESSMENT: 0-10

## 2019-11-29 ENCOUNTER — HOSPITAL ENCOUNTER (OUTPATIENT)
Dept: INTERVENTIONAL RADIOLOGY/VASCULAR | Age: 64
Discharge: HOME | End: 2019-11-29

## 2019-11-29 VITALS
DIASTOLIC BLOOD PRESSURE: 74 MMHG | HEART RATE: 89 BPM | TEMPERATURE: 97 F | OXYGEN SATURATION: 95 % | RESPIRATION RATE: 18 BRPM | BODY MASS INDEX: 27.21 KG/M2 | HEIGHT: 74 IN | SYSTOLIC BLOOD PRESSURE: 116 MMHG | WEIGHT: 212 LBS

## 2019-11-29 DIAGNOSIS — K70.31 ASCITES DUE TO ALCOHOLIC CIRRHOSIS (HCC): ICD-10-CM

## 2019-11-29 LAB
INR BLD: 1.48 (ref 0.86–1.14)
PROTHROMBIN TIME: 17.2 SEC (ref 10–13.2)

## 2019-11-29 PROCEDURE — 36415 COLL VENOUS BLD VENIPUNCTURE: CPT

## 2019-11-29 PROCEDURE — 7100000010 HC PHASE II RECOVERY - FIRST 15 MIN

## 2019-11-29 PROCEDURE — 2500000003 HC RX 250 WO HCPCS: Performed by: RADIOLOGY

## 2019-11-29 PROCEDURE — 49083 ABD PARACENTESIS W/IMAGING: CPT

## 2019-11-29 PROCEDURE — 85610 PROTHROMBIN TIME: CPT

## 2019-11-29 PROCEDURE — C1729 CATH, DRAINAGE: HCPCS

## 2019-11-29 RX ORDER — LIDOCAINE HYDROCHLORIDE 10 MG/ML
5 INJECTION, SOLUTION EPIDURAL; INFILTRATION; INTRACAUDAL; PERINEURAL ONCE
Status: COMPLETED | OUTPATIENT
Start: 2019-11-29 | End: 2019-11-29

## 2019-11-29 RX ADMIN — LIDOCAINE HYDROCHLORIDE 5 ML: 10 INJECTION, SOLUTION EPIDURAL; INFILTRATION; INTRACAUDAL; PERINEURAL at 14:25

## 2019-12-05 ENCOUNTER — HOSPITAL ENCOUNTER (OUTPATIENT)
Dept: ULTRASOUND IMAGING | Age: 64
Discharge: HOME | End: 2019-12-05

## 2019-12-05 VITALS
HEART RATE: 100 BPM | OXYGEN SATURATION: 96 % | TEMPERATURE: 98 F | SYSTOLIC BLOOD PRESSURE: 113 MMHG | RESPIRATION RATE: 16 BRPM | DIASTOLIC BLOOD PRESSURE: 73 MMHG

## 2019-12-05 DIAGNOSIS — R18.8 OTHER ASCITES: ICD-10-CM

## 2019-12-05 LAB
APTT: 40.7 SEC (ref 24.2–36.2)
INR BLD: 1.45 (ref 0.86–1.14)
PROTHROMBIN TIME: 16.9 SEC (ref 10–13.2)

## 2019-12-05 PROCEDURE — 76705 ECHO EXAM OF ABDOMEN: CPT

## 2019-12-05 PROCEDURE — 85610 PROTHROMBIN TIME: CPT

## 2019-12-05 PROCEDURE — 85730 THROMBOPLASTIN TIME PARTIAL: CPT

## 2019-12-05 PROCEDURE — 36415 COLL VENOUS BLD VENIPUNCTURE: CPT

## 2019-12-12 ENCOUNTER — HOSPITAL ENCOUNTER (OUTPATIENT)
Dept: ULTRASOUND IMAGING | Age: 64
Discharge: HOME | End: 2019-12-12

## 2019-12-12 VITALS
DIASTOLIC BLOOD PRESSURE: 64 MMHG | TEMPERATURE: 96.8 F | RESPIRATION RATE: 20 BRPM | SYSTOLIC BLOOD PRESSURE: 110 MMHG | HEART RATE: 87 BPM | OXYGEN SATURATION: 99 %

## 2019-12-12 DIAGNOSIS — R18.8 OTHER ASCITES: ICD-10-CM

## 2019-12-12 PROCEDURE — 7100000011 HC PHASE II RECOVERY - ADDTL 15 MIN

## 2019-12-12 PROCEDURE — 49083 ABD PARACENTESIS W/IMAGING: CPT

## 2019-12-12 PROCEDURE — 2500000003 HC RX 250 WO HCPCS: Performed by: INTERNAL MEDICINE

## 2019-12-12 PROCEDURE — 7100000010 HC PHASE II RECOVERY - FIRST 15 MIN

## 2019-12-12 PROCEDURE — 6370000000 HC RX 637 (ALT 250 FOR IP): Performed by: INTERNAL MEDICINE

## 2019-12-12 RX ORDER — LIDOCAINE HYDROCHLORIDE 10 MG/ML
5 INJECTION, SOLUTION EPIDURAL; INFILTRATION; INTRACAUDAL; PERINEURAL ONCE
Status: COMPLETED | OUTPATIENT
Start: 2019-12-12 | End: 2019-12-12

## 2019-12-12 RX ORDER — BACITRACIN, NEOMYCIN, POLYMYXIN B 400; 3.5; 5 [USP'U]/G; MG/G; [USP'U]/G
OINTMENT TOPICAL ONCE
Status: COMPLETED | OUTPATIENT
Start: 2019-12-12 | End: 2019-12-12

## 2019-12-12 RX ADMIN — LIDOCAINE HYDROCHLORIDE 5 ML: 10 INJECTION, SOLUTION EPIDURAL; INFILTRATION; INTRACAUDAL; PERINEURAL at 12:10

## 2019-12-12 RX ADMIN — BACITRACIN ZINC, NEOMYCIN SULFATE, POLYMYXIN B SULFATE: 3.5; 5000; 4 OINTMENT TOPICAL at 12:45

## 2019-12-26 ENCOUNTER — HOSPITAL ENCOUNTER (OUTPATIENT)
Dept: ULTRASOUND IMAGING | Age: 64
Discharge: HOME | End: 2019-12-26

## 2019-12-26 VITALS
OXYGEN SATURATION: 94 % | RESPIRATION RATE: 18 BRPM | SYSTOLIC BLOOD PRESSURE: 95 MMHG | TEMPERATURE: 97.1 F | DIASTOLIC BLOOD PRESSURE: 65 MMHG | HEART RATE: 83 BPM

## 2019-12-26 DIAGNOSIS — R18.8 OTHER ASCITES: ICD-10-CM

## 2019-12-26 LAB
APTT: 42.7 SEC (ref 24.2–36.2)
INR BLD: 1.5 (ref 0.86–1.14)
PROTHROMBIN TIME: 17.5 SEC (ref 10–13.2)

## 2019-12-26 PROCEDURE — 36415 COLL VENOUS BLD VENIPUNCTURE: CPT

## 2019-12-26 PROCEDURE — 6370000000 HC RX 637 (ALT 250 FOR IP): Performed by: INTERNAL MEDICINE

## 2019-12-26 PROCEDURE — 2500000003 HC RX 250 WO HCPCS: Performed by: INTERNAL MEDICINE

## 2019-12-26 PROCEDURE — 85730 THROMBOPLASTIN TIME PARTIAL: CPT

## 2019-12-26 PROCEDURE — 7100000010 HC PHASE II RECOVERY - FIRST 15 MIN

## 2019-12-26 PROCEDURE — 85610 PROTHROMBIN TIME: CPT

## 2019-12-26 PROCEDURE — 49083 ABD PARACENTESIS W/IMAGING: CPT

## 2019-12-26 RX ORDER — BACITRACIN, NEOMYCIN, POLYMYXIN B 400; 3.5; 5 [USP'U]/G; MG/G; [USP'U]/G
OINTMENT TOPICAL 2 TIMES DAILY
Status: DISCONTINUED | OUTPATIENT
Start: 2019-12-26 | End: 2019-12-27 | Stop reason: HOSPADM

## 2019-12-26 RX ORDER — ALBUMIN (HUMAN) 12.5 G/50ML
0-250 SOLUTION INTRAVENOUS ONCE
Status: DISCONTINUED | OUTPATIENT
Start: 2019-12-26 | End: 2019-12-27 | Stop reason: HOSPADM

## 2019-12-26 RX ORDER — LIDOCAINE HYDROCHLORIDE 10 MG/ML
5 INJECTION, SOLUTION EPIDURAL; INFILTRATION; INTRACAUDAL; PERINEURAL ONCE
Status: COMPLETED | OUTPATIENT
Start: 2019-12-26 | End: 2019-12-26

## 2019-12-26 RX ORDER — ACETAMINOPHEN 325 MG/1
650 TABLET ORAL EVERY 4 HOURS PRN
Status: DISCONTINUED | OUTPATIENT
Start: 2019-12-26 | End: 2019-12-27 | Stop reason: HOSPADM

## 2019-12-26 RX ADMIN — LIDOCAINE HYDROCHLORIDE 5 ML: 10 INJECTION, SOLUTION EPIDURAL; INFILTRATION; INTRACAUDAL; PERINEURAL at 13:37

## 2019-12-26 RX ADMIN — BACITRACIN ZINC, NEOMYCIN SULFATE, POLYMYXIN B SULFATE: 3.5; 5000; 4 OINTMENT TOPICAL at 14:02

## 2020-01-02 ENCOUNTER — HOSPITAL ENCOUNTER (OUTPATIENT)
Dept: ULTRASOUND IMAGING | Age: 65
Discharge: HOME | End: 2020-01-02

## 2020-01-02 VITALS
OXYGEN SATURATION: 98 % | DIASTOLIC BLOOD PRESSURE: 70 MMHG | RESPIRATION RATE: 16 BRPM | TEMPERATURE: 98.6 F | SYSTOLIC BLOOD PRESSURE: 121 MMHG | HEART RATE: 90 BPM | WEIGHT: 230 LBS | BODY MASS INDEX: 29.53 KG/M2

## 2020-01-02 LAB
APTT: 41.4 SEC (ref 24.2–36.2)
INR BLD: 1.54 (ref 0.86–1.14)
PROTHROMBIN TIME: 18 SEC (ref 10–13.2)

## 2020-01-02 PROCEDURE — 85610 PROTHROMBIN TIME: CPT

## 2020-01-02 PROCEDURE — 36415 COLL VENOUS BLD VENIPUNCTURE: CPT

## 2020-01-02 PROCEDURE — 2709999900 US GUIDED PARACENTESIS

## 2020-01-02 PROCEDURE — 2500000003 HC RX 250 WO HCPCS: Performed by: INTERNAL MEDICINE

## 2020-01-02 PROCEDURE — 6370000000 HC RX 637 (ALT 250 FOR IP): Performed by: INTERNAL MEDICINE

## 2020-01-02 PROCEDURE — 85730 THROMBOPLASTIN TIME PARTIAL: CPT

## 2020-01-02 PROCEDURE — 7100000010 HC PHASE II RECOVERY - FIRST 15 MIN

## 2020-01-02 RX ORDER — LIDOCAINE HYDROCHLORIDE 10 MG/ML
5 INJECTION, SOLUTION EPIDURAL; INFILTRATION; INTRACAUDAL; PERINEURAL ONCE
Status: COMPLETED | OUTPATIENT
Start: 2020-01-02 | End: 2020-01-02

## 2020-01-02 RX ORDER — BACITRACIN, NEOMYCIN, POLYMYXIN B 400; 3.5; 5 [USP'U]/G; MG/G; [USP'U]/G
1 OINTMENT TOPICAL ONCE
Status: COMPLETED | OUTPATIENT
Start: 2020-01-02 | End: 2020-01-02

## 2020-01-02 RX ADMIN — BACITRACIN ZINC, NEOMYCIN SULFATE, POLYMYXIN B SULFATE 1 G: 3.5; 5000; 4 OINTMENT TOPICAL at 13:55

## 2020-01-02 RX ADMIN — LIDOCAINE HYDROCHLORIDE 5 ML: 10 INJECTION, SOLUTION EPIDURAL; INFILTRATION; INTRACAUDAL; PERINEURAL at 13:15

## 2020-01-02 ASSESSMENT — PAIN - FUNCTIONAL ASSESSMENT: PAIN_FUNCTIONAL_ASSESSMENT: 0-10

## 2020-01-02 NOTE — PROGRESS NOTES
Pt to preop from home by self. Pt is awake alert and oriented. Vss. Consent obtained for paracentesis. All personal belongings kept with pt on cart. Will monitor.

## 2020-01-09 ENCOUNTER — HOSPITAL ENCOUNTER (OUTPATIENT)
Dept: ULTRASOUND IMAGING | Age: 65
Discharge: HOME | End: 2020-01-09

## 2020-01-09 VITALS
SYSTOLIC BLOOD PRESSURE: 118 MMHG | DIASTOLIC BLOOD PRESSURE: 75 MMHG | HEART RATE: 92 BPM | TEMPERATURE: 97 F | OXYGEN SATURATION: 96 % | RESPIRATION RATE: 18 BRPM

## 2020-01-09 PROCEDURE — 7100000011 HC PHASE II RECOVERY - ADDTL 15 MIN

## 2020-01-09 PROCEDURE — 7100000010 HC PHASE II RECOVERY - FIRST 15 MIN

## 2020-01-09 PROCEDURE — 2709999900 US GUIDED PARACENTESIS

## 2020-01-09 RX ORDER — LIDOCAINE HYDROCHLORIDE 10 MG/ML
5 INJECTION, SOLUTION EPIDURAL; INFILTRATION; INTRACAUDAL; PERINEURAL ONCE
Status: DISCONTINUED | OUTPATIENT
Start: 2020-01-09 | End: 2020-01-10 | Stop reason: HOSPADM

## 2020-01-09 NOTE — PROGRESS NOTES
Pt arrived to me in same day surgery for phase 2 recovery monitoring and care prior to d/c from Radiology/Special Procedures/Ultrasound in good condition. Pt is alert and oriented X4. Report received from Radiology RN.     S/p paracentesis, 4250mL removed from abd with Dr. Dilshad Root  Dressing: CDI  Pt may leave per MD order: now; after brief assessment      Rosita REYESN, RN, [facility]  Pre-Op/Recovery   Same Day Surgery

## 2020-01-16 ENCOUNTER — HOSPITAL ENCOUNTER (OUTPATIENT)
Dept: ULTRASOUND IMAGING | Age: 65
Discharge: HOME | End: 2020-01-16

## 2020-01-16 VITALS
DIASTOLIC BLOOD PRESSURE: 72 MMHG | SYSTOLIC BLOOD PRESSURE: 117 MMHG | HEART RATE: 88 BPM | OXYGEN SATURATION: 96 % | RESPIRATION RATE: 16 BRPM | TEMPERATURE: 97.6 F

## 2020-01-16 LAB
APTT: 41.3 SEC (ref 24.2–36.2)
INR BLD: 1.43 (ref 0.86–1.14)
PROTHROMBIN TIME: 16.6 SEC (ref 10–13.2)

## 2020-01-16 PROCEDURE — 7100000010 HC PHASE II RECOVERY - FIRST 15 MIN

## 2020-01-16 PROCEDURE — 6370000000 HC RX 637 (ALT 250 FOR IP): Performed by: INTERNAL MEDICINE

## 2020-01-16 PROCEDURE — 85730 THROMBOPLASTIN TIME PARTIAL: CPT

## 2020-01-16 PROCEDURE — 36415 COLL VENOUS BLD VENIPUNCTURE: CPT

## 2020-01-16 PROCEDURE — 2500000003 HC RX 250 WO HCPCS: Performed by: INTERNAL MEDICINE

## 2020-01-16 PROCEDURE — 85610 PROTHROMBIN TIME: CPT

## 2020-01-16 PROCEDURE — 2709999900 US GUIDED PARACENTESIS

## 2020-01-16 RX ORDER — LIDOCAINE HYDROCHLORIDE 10 MG/ML
5 INJECTION, SOLUTION EPIDURAL; INFILTRATION; INTRACAUDAL; PERINEURAL ONCE
Status: COMPLETED | OUTPATIENT
Start: 2020-01-16 | End: 2020-01-16

## 2020-01-16 RX ADMIN — Medication: at 12:15

## 2020-01-16 RX ADMIN — LIDOCAINE HYDROCHLORIDE 5 ML: 10 INJECTION, SOLUTION EPIDURAL; INFILTRATION; INTRACAUDAL; PERINEURAL at 12:45

## 2020-01-23 ENCOUNTER — HOSPITAL ENCOUNTER (OUTPATIENT)
Dept: INTERVENTIONAL RADIOLOGY/VASCULAR | Age: 65
Discharge: HOME | End: 2020-01-23

## 2020-01-23 VITALS
DIASTOLIC BLOOD PRESSURE: 71 MMHG | RESPIRATION RATE: 18 BRPM | OXYGEN SATURATION: 95 % | HEART RATE: 98 BPM | SYSTOLIC BLOOD PRESSURE: 120 MMHG | TEMPERATURE: 98 F

## 2020-01-23 LAB
APTT: 41.4 SEC (ref 24.2–36.2)
INR BLD: 1.49 (ref 0.86–1.14)
PROTHROMBIN TIME: 17.3 SEC (ref 10–13.2)

## 2020-01-23 PROCEDURE — 85610 PROTHROMBIN TIME: CPT

## 2020-01-23 PROCEDURE — 49083 ABD PARACENTESIS W/IMAGING: CPT

## 2020-01-23 PROCEDURE — 85730 THROMBOPLASTIN TIME PARTIAL: CPT

## 2020-01-23 PROCEDURE — 7100000010 HC PHASE II RECOVERY - FIRST 15 MIN

## 2020-01-23 PROCEDURE — C1729 CATH, DRAINAGE: HCPCS

## 2020-01-23 PROCEDURE — 2500000003 HC RX 250 WO HCPCS: Performed by: RADIOLOGY

## 2020-01-23 PROCEDURE — 36415 COLL VENOUS BLD VENIPUNCTURE: CPT

## 2020-01-23 RX ORDER — LIDOCAINE HYDROCHLORIDE 10 MG/ML
20 INJECTION, SOLUTION EPIDURAL; INFILTRATION; INTRACAUDAL; PERINEURAL ONCE
Status: COMPLETED | OUTPATIENT
Start: 2020-01-23 | End: 2020-01-23

## 2020-01-23 RX ADMIN — LIDOCAINE HYDROCHLORIDE 8 ML: 10 INJECTION, SOLUTION EPIDURAL; INFILTRATION; INTRACAUDAL; PERINEURAL at 13:10

## 2020-01-23 ASSESSMENT — PAIN SCALES - GENERAL: PAINLEVEL_OUTOF10: 0

## 2020-02-06 ENCOUNTER — HOSPITAL ENCOUNTER (OUTPATIENT)
Dept: ULTRASOUND IMAGING | Age: 65
Discharge: HOME | End: 2020-02-06

## 2020-02-06 VITALS
OXYGEN SATURATION: 93 % | SYSTOLIC BLOOD PRESSURE: 114 MMHG | BODY MASS INDEX: 31.52 KG/M2 | HEART RATE: 90 BPM | DIASTOLIC BLOOD PRESSURE: 75 MMHG | TEMPERATURE: 97.6 F | WEIGHT: 245.5 LBS | RESPIRATION RATE: 20 BRPM

## 2020-02-06 LAB
APTT: 40.9 SEC (ref 24.2–36.2)
INR BLD: 1.52 (ref 0.86–1.14)
PROTHROMBIN TIME: 17.7 SEC (ref 10–13.2)

## 2020-02-06 PROCEDURE — 85610 PROTHROMBIN TIME: CPT

## 2020-02-06 PROCEDURE — 7100000010 HC PHASE II RECOVERY - FIRST 15 MIN

## 2020-02-06 PROCEDURE — 85730 THROMBOPLASTIN TIME PARTIAL: CPT

## 2020-02-06 PROCEDURE — 6370000000 HC RX 637 (ALT 250 FOR IP): Performed by: INTERNAL MEDICINE

## 2020-02-06 PROCEDURE — 36415 COLL VENOUS BLD VENIPUNCTURE: CPT

## 2020-02-06 PROCEDURE — 2500000003 HC RX 250 WO HCPCS: Performed by: INTERNAL MEDICINE

## 2020-02-06 PROCEDURE — 2709999900 US GUIDED PARACENTESIS

## 2020-02-06 RX ORDER — LIDOCAINE HYDROCHLORIDE 10 MG/ML
5 INJECTION, SOLUTION EPIDURAL; INFILTRATION; INTRACAUDAL; PERINEURAL ONCE
Status: COMPLETED | OUTPATIENT
Start: 2020-02-06 | End: 2020-02-06

## 2020-02-06 RX ADMIN — Medication: at 15:30

## 2020-02-06 RX ADMIN — LIDOCAINE HYDROCHLORIDE 5 ML: 10 INJECTION, SOLUTION EPIDURAL; INFILTRATION; INTRACAUDAL; PERINEURAL at 15:10

## 2020-02-06 ASSESSMENT — PAIN - FUNCTIONAL ASSESSMENT: PAIN_FUNCTIONAL_ASSESSMENT: 0-10

## 2020-02-06 NOTE — PROGRESS NOTES
Discharge instructions reviewed and understanding verbalized per pt with copy given. All home medications have been reviewed, questions answered and patient state understanding.

## 2020-02-13 ENCOUNTER — HOSPITAL ENCOUNTER (OUTPATIENT)
Dept: INTERVENTIONAL RADIOLOGY/VASCULAR | Age: 65
Discharge: HOME | End: 2020-02-13

## 2020-02-13 ENCOUNTER — HOSPITAL ENCOUNTER (OUTPATIENT)
Dept: ULTRASOUND IMAGING | Age: 65
Discharge: HOME | End: 2020-02-13

## 2020-02-13 VITALS
HEART RATE: 99 BPM | WEIGHT: 245 LBS | OXYGEN SATURATION: 95 % | DIASTOLIC BLOOD PRESSURE: 77 MMHG | BODY MASS INDEX: 31.44 KG/M2 | RESPIRATION RATE: 18 BRPM | TEMPERATURE: 97 F | SYSTOLIC BLOOD PRESSURE: 123 MMHG | HEIGHT: 74 IN

## 2020-02-13 LAB
APTT: 41.5 SEC (ref 24.2–36.2)
INR BLD: 1.49 (ref 0.86–1.14)
PROTHROMBIN TIME: 17.3 SEC (ref 10–13.2)

## 2020-02-13 PROCEDURE — 2500000003 HC RX 250 WO HCPCS: Performed by: RADIOLOGY

## 2020-02-13 PROCEDURE — 36415 COLL VENOUS BLD VENIPUNCTURE: CPT

## 2020-02-13 PROCEDURE — 7100000010 HC PHASE II RECOVERY - FIRST 15 MIN

## 2020-02-13 PROCEDURE — C1729 CATH, DRAINAGE: HCPCS

## 2020-02-13 PROCEDURE — 85730 THROMBOPLASTIN TIME PARTIAL: CPT

## 2020-02-13 PROCEDURE — 49083 ABD PARACENTESIS W/IMAGING: CPT

## 2020-02-13 PROCEDURE — 85610 PROTHROMBIN TIME: CPT

## 2020-02-13 RX ORDER — LIDOCAINE HYDROCHLORIDE 10 MG/ML
20 INJECTION, SOLUTION EPIDURAL; INFILTRATION; INTRACAUDAL; PERINEURAL ONCE
Status: COMPLETED | OUTPATIENT
Start: 2020-02-13 | End: 2020-02-13

## 2020-02-13 RX ADMIN — LIDOCAINE HYDROCHLORIDE 8 ML: 10 INJECTION, SOLUTION EPIDURAL; INFILTRATION; INTRACAUDAL; PERINEURAL at 13:00

## 2020-02-13 NOTE — PROGRESS NOTES
Patient/report received from ultrasound, vss, a/o, jose to paracentsis site c/d/i, states he is ready for d/c

## 2020-02-27 ENCOUNTER — HOSPITAL ENCOUNTER (OUTPATIENT)
Dept: ULTRASOUND IMAGING | Age: 65
Discharge: HOME | End: 2020-02-27

## 2020-02-27 VITALS
TEMPERATURE: 98 F | SYSTOLIC BLOOD PRESSURE: 114 MMHG | RESPIRATION RATE: 18 BRPM | OXYGEN SATURATION: 93 % | HEART RATE: 97 BPM | WEIGHT: 251 LBS | BODY MASS INDEX: 32.21 KG/M2 | DIASTOLIC BLOOD PRESSURE: 68 MMHG | HEIGHT: 74 IN

## 2020-02-27 LAB
APTT: 41.3 SEC (ref 24.2–36.2)
INR BLD: 1.49 (ref 0.86–1.14)
PROTHROMBIN TIME: 17.4 SEC (ref 10–13.2)

## 2020-02-27 PROCEDURE — 85610 PROTHROMBIN TIME: CPT

## 2020-02-27 PROCEDURE — 36415 COLL VENOUS BLD VENIPUNCTURE: CPT

## 2020-02-27 PROCEDURE — 49083 ABD PARACENTESIS W/IMAGING: CPT

## 2020-02-27 PROCEDURE — 7100000010 HC PHASE II RECOVERY - FIRST 15 MIN

## 2020-02-27 PROCEDURE — 85730 THROMBOPLASTIN TIME PARTIAL: CPT

## 2020-02-27 PROCEDURE — 6370000000 HC RX 637 (ALT 250 FOR IP): Performed by: INTERNAL MEDICINE

## 2020-02-27 PROCEDURE — 2500000003 HC RX 250 WO HCPCS: Performed by: INTERNAL MEDICINE

## 2020-02-27 RX ORDER — LIDOCAINE HYDROCHLORIDE 10 MG/ML
5 INJECTION, SOLUTION EPIDURAL; INFILTRATION; INTRACAUDAL; PERINEURAL ONCE
Status: COMPLETED | OUTPATIENT
Start: 2020-02-27 | End: 2020-02-27

## 2020-02-27 RX ADMIN — LIDOCAINE HYDROCHLORIDE 5 ML: 10 INJECTION, SOLUTION EPIDURAL; INFILTRATION; INTRACAUDAL; PERINEURAL at 12:40

## 2020-02-27 RX ADMIN — Medication: at 13:40

## 2020-02-27 ASSESSMENT — PAIN - FUNCTIONAL ASSESSMENT: PAIN_FUNCTIONAL_ASSESSMENT: 0-10

## 2020-02-27 NOTE — PROGRESS NOTES
Patient/report received from Ultrasound, vss, a/o, drsg to paracentesis site c/d/i, call light in reach, will monitor

## 2020-02-27 NOTE — PROGRESS NOTES
Teaching / education initiated regarding perioperative experience, expectations, and pain management during stay. Patient verbalized understanding. Patient awake, alert and oriented. VSS. Denies pain at present. Labs drawn at this time. Call light in reach. Continue to monitor.   Kip Stanley RN

## 2020-03-05 ENCOUNTER — HOSPITAL ENCOUNTER (OUTPATIENT)
Dept: ULTRASOUND IMAGING | Age: 65
Discharge: HOME | End: 2020-03-05

## 2020-03-05 VITALS
HEART RATE: 99 BPM | RESPIRATION RATE: 20 BRPM | TEMPERATURE: 97 F | OXYGEN SATURATION: 97 % | DIASTOLIC BLOOD PRESSURE: 73 MMHG | SYSTOLIC BLOOD PRESSURE: 98 MMHG

## 2020-03-05 LAB
APTT: 42.6 SEC (ref 24.2–36.2)
INR BLD: 1.63 (ref 0.86–1.14)
PROTHROMBIN TIME: 19 SEC (ref 10–13.2)

## 2020-03-05 PROCEDURE — 85610 PROTHROMBIN TIME: CPT

## 2020-03-05 PROCEDURE — 85730 THROMBOPLASTIN TIME PARTIAL: CPT

## 2020-03-05 PROCEDURE — 49083 ABD PARACENTESIS W/IMAGING: CPT

## 2020-03-05 PROCEDURE — 7100000010 HC PHASE II RECOVERY - FIRST 15 MIN

## 2020-03-05 PROCEDURE — 2500000003 HC RX 250 WO HCPCS: Performed by: INTERNAL MEDICINE

## 2020-03-05 PROCEDURE — 7100000011 HC PHASE II RECOVERY - ADDTL 15 MIN

## 2020-03-05 PROCEDURE — 36415 COLL VENOUS BLD VENIPUNCTURE: CPT

## 2020-03-05 RX ORDER — BACITRACIN, NEOMYCIN, POLYMYXIN B 400; 3.5; 5 [USP'U]/G; MG/G; [USP'U]/G
OINTMENT TOPICAL ONCE
Status: DISCONTINUED | OUTPATIENT
Start: 2020-03-05 | End: 2020-03-06 | Stop reason: HOSPADM

## 2020-03-05 RX ORDER — LIDOCAINE HYDROCHLORIDE 10 MG/ML
5 INJECTION, SOLUTION EPIDURAL; INFILTRATION; INTRACAUDAL; PERINEURAL ONCE
Status: COMPLETED | OUTPATIENT
Start: 2020-03-05 | End: 2020-03-05

## 2020-03-05 RX ADMIN — LIDOCAINE HYDROCHLORIDE 5 ML: 10 INJECTION, SOLUTION EPIDURAL; INFILTRATION; INTRACAUDAL; PERINEURAL at 12:38

## 2020-03-05 ASSESSMENT — PAIN SCALES - GENERAL: PAINLEVEL_OUTOF10: 0

## 2020-03-05 ASSESSMENT — PAIN - FUNCTIONAL ASSESSMENT: PAIN_FUNCTIONAL_ASSESSMENT: 0-10

## 2020-03-12 ENCOUNTER — HOSPITAL ENCOUNTER (OUTPATIENT)
Dept: ULTRASOUND IMAGING | Age: 65
Discharge: HOME | End: 2020-03-12

## 2020-03-12 VITALS
SYSTOLIC BLOOD PRESSURE: 117 MMHG | OXYGEN SATURATION: 95 % | BODY MASS INDEX: 32.98 KG/M2 | HEIGHT: 74 IN | TEMPERATURE: 97.3 F | RESPIRATION RATE: 18 BRPM | DIASTOLIC BLOOD PRESSURE: 71 MMHG | HEART RATE: 88 BPM | WEIGHT: 257 LBS

## 2020-03-12 LAB
APTT: 43.8 SEC (ref 24.2–36.2)
INR BLD: 1.53 (ref 0.86–1.14)
PROTHROMBIN TIME: 17.8 SEC (ref 10–13.2)

## 2020-03-12 PROCEDURE — 6370000000 HC RX 637 (ALT 250 FOR IP): Performed by: INTERNAL MEDICINE

## 2020-03-12 PROCEDURE — 85610 PROTHROMBIN TIME: CPT

## 2020-03-12 PROCEDURE — 7100000010 HC PHASE II RECOVERY - FIRST 15 MIN

## 2020-03-12 PROCEDURE — 85730 THROMBOPLASTIN TIME PARTIAL: CPT

## 2020-03-12 PROCEDURE — 49083 ABD PARACENTESIS W/IMAGING: CPT

## 2020-03-12 PROCEDURE — 36415 COLL VENOUS BLD VENIPUNCTURE: CPT

## 2020-03-12 PROCEDURE — 2500000003 HC RX 250 WO HCPCS: Performed by: INTERNAL MEDICINE

## 2020-03-12 PROCEDURE — 7100000011 HC PHASE II RECOVERY - ADDTL 15 MIN

## 2020-03-12 RX ORDER — BACITRACIN, NEOMYCIN, POLYMYXIN B 400; 3.5; 5 [USP'U]/G; MG/G; [USP'U]/G
OINTMENT TOPICAL ONCE
Status: COMPLETED | OUTPATIENT
Start: 2020-03-12 | End: 2020-03-12

## 2020-03-12 RX ORDER — LIDOCAINE HYDROCHLORIDE 10 MG/ML
5 INJECTION, SOLUTION EPIDURAL; INFILTRATION; INTRACAUDAL; PERINEURAL ONCE
Status: COMPLETED | OUTPATIENT
Start: 2020-03-12 | End: 2020-03-12

## 2020-03-12 RX ADMIN — BACITRACIN ZINC, NEOMYCIN SULFATE, POLYMYXIN B SULFATE: 3.5; 5000; 4 OINTMENT TOPICAL at 13:50

## 2020-03-12 RX ADMIN — LIDOCAINE HYDROCHLORIDE 5 ML: 10 INJECTION, SOLUTION EPIDURAL; INFILTRATION; INTRACAUDAL; PERINEURAL at 13:20

## 2020-03-12 ASSESSMENT — PAIN - FUNCTIONAL ASSESSMENT: PAIN_FUNCTIONAL_ASSESSMENT: 0-10

## 2020-03-12 NOTE — PROGRESS NOTES
Discharge instructions reviewed with patient. All home medications have been reviewed, questions answered and patient verbalized understanding. Discharge instructions signed and copies given. Patient discharged  with belongings.

## 2020-03-19 ENCOUNTER — HOSPITAL ENCOUNTER (OUTPATIENT)
Dept: ULTRASOUND IMAGING | Age: 65
Discharge: HOME | End: 2020-03-19

## 2020-03-19 VITALS
BODY MASS INDEX: 33.5 KG/M2 | HEART RATE: 100 BPM | RESPIRATION RATE: 18 BRPM | WEIGHT: 261 LBS | TEMPERATURE: 98 F | SYSTOLIC BLOOD PRESSURE: 98 MMHG | HEIGHT: 74 IN | OXYGEN SATURATION: 97 % | DIASTOLIC BLOOD PRESSURE: 79 MMHG

## 2020-03-19 LAB
INR BLD: 1.54 (ref 0.86–1.14)
PROTHROMBIN TIME: 18 SEC (ref 10–13.2)

## 2020-03-19 PROCEDURE — 7100000011 HC PHASE II RECOVERY - ADDTL 15 MIN

## 2020-03-19 PROCEDURE — 7100000010 HC PHASE II RECOVERY - FIRST 15 MIN

## 2020-03-19 PROCEDURE — 85610 PROTHROMBIN TIME: CPT

## 2020-03-19 PROCEDURE — 6370000000 HC RX 637 (ALT 250 FOR IP): Performed by: INTERNAL MEDICINE

## 2020-03-19 PROCEDURE — 2500000003 HC RX 250 WO HCPCS: Performed by: INTERNAL MEDICINE

## 2020-03-19 PROCEDURE — 36415 COLL VENOUS BLD VENIPUNCTURE: CPT

## 2020-03-19 PROCEDURE — 49083 ABD PARACENTESIS W/IMAGING: CPT

## 2020-03-19 RX ORDER — LIDOCAINE HYDROCHLORIDE 10 MG/ML
5 INJECTION, SOLUTION EPIDURAL; INFILTRATION; INTRACAUDAL; PERINEURAL ONCE
Status: COMPLETED | OUTPATIENT
Start: 2020-03-19 | End: 2020-03-19

## 2020-03-19 RX ADMIN — NEOMYCIN AND POLYMYXIN B SULFATES, BACITRACIN ZINC, AND HYDROCORTISONE: 3.5; 5000; 400; 1 OINTMENT TOPICAL at 13:40

## 2020-03-19 RX ADMIN — LIDOCAINE HYDROCHLORIDE 5 ML: 10 INJECTION, SOLUTION EPIDURAL; INFILTRATION; INTRACAUDAL; PERINEURAL at 13:05

## 2020-03-19 ASSESSMENT — PAIN SCALES - GENERAL: PAINLEVEL_OUTOF10: 0

## 2020-03-19 ASSESSMENT — PAIN - FUNCTIONAL ASSESSMENT: PAIN_FUNCTIONAL_ASSESSMENT: 0-10

## 2020-03-26 ENCOUNTER — HOSPITAL ENCOUNTER (OUTPATIENT)
Dept: ULTRASOUND IMAGING | Age: 65
Discharge: HOME | End: 2020-03-26

## 2020-03-26 VITALS
WEIGHT: 261.13 LBS | OXYGEN SATURATION: 94 % | TEMPERATURE: 98.5 F | DIASTOLIC BLOOD PRESSURE: 68 MMHG | HEIGHT: 74 IN | HEART RATE: 85 BPM | RESPIRATION RATE: 18 BRPM | BODY MASS INDEX: 33.51 KG/M2 | SYSTOLIC BLOOD PRESSURE: 136 MMHG

## 2020-03-26 LAB
A/G RATIO: 0.3 (ref 1.1–2.2)
ALBUMIN FLUID: <0.2 G/DL
ALBUMIN SERPL-MCNC: 1.7 G/DL (ref 3.4–5)
ALP BLD-CCNC: 160 U/L (ref 40–129)
ALT SERPL-CCNC: 15 U/L (ref 10–40)
ANION GAP SERPL CALCULATED.3IONS-SCNC: 9 MMOL/L (ref 3–16)
APPEARANCE FLUID: NORMAL
APTT: 43.3 SEC (ref 24.2–36.2)
AST SERPL-CCNC: 27 U/L (ref 15–37)
BASOPHILS ABSOLUTE: 0 K/UL (ref 0–0.2)
BASOPHILS RELATIVE PERCENT: 0.4 %
BILIRUB SERPL-MCNC: 3.6 MG/DL (ref 0–1)
BUN BLDV-MCNC: 8 MG/DL (ref 7–20)
CALCIUM SERPL-MCNC: 7.8 MG/DL (ref 8.3–10.6)
CELL COUNT FLUID TYPE: NORMAL
CHLORIDE BLD-SCNC: 99 MMOL/L (ref 99–110)
CLOT EVALUATION: NORMAL
CO2: 26 MMOL/L (ref 21–32)
COLOR FLUID: YELLOW
CREAT SERPL-MCNC: 0.8 MG/DL (ref 0.8–1.3)
EOSINOPHILS ABSOLUTE: 0.2 K/UL (ref 0–0.6)
EOSINOPHILS RELATIVE PERCENT: 2.6 %
FLUID TYPE: NORMAL
GFR AFRICAN AMERICAN: >60
GFR NON-AFRICAN AMERICAN: >60
GLOBULIN: 5.2 G/DL
GLUCOSE BLD-MCNC: 148 MG/DL (ref 70–99)
HCT VFR BLD CALC: 31.6 % (ref 40.5–52.5)
HEMOGLOBIN: 10.7 G/DL (ref 13.5–17.5)
INR BLD: 1.56 (ref 0.86–1.14)
LYMPHOCYTES ABSOLUTE: 1.5 K/UL (ref 1–5.1)
LYMPHOCYTES RELATIVE PERCENT: 22.3 %
LYMPHOCYTES, BODY FLUID: 66 %
MACROPHAGE FLUID: 8 %
MCH RBC QN AUTO: 36 PG (ref 26–34)
MCHC RBC AUTO-ENTMCNC: 33.8 G/DL (ref 31–36)
MCV RBC AUTO: 106.5 FL (ref 80–100)
MONOCYTE, FLUID: 25 %
MONOCYTES ABSOLUTE: 1.3 K/UL (ref 0–1.3)
MONOCYTES RELATIVE PERCENT: 19.4 %
NEUTROPHIL, FLUID: 1 %
NEUTROPHILS ABSOLUTE: 3.6 K/UL (ref 1.7–7.7)
NEUTROPHILS RELATIVE PERCENT: 55.3 %
NUCLEATED CELLS FLUID: 52 /CUMM
NUMBER OF CELLS COUNTED FLUID: 100
PDW BLD-RTO: 16.8 % (ref 12.4–15.4)
PLATELET # BLD: 83 K/UL (ref 135–450)
PMV BLD AUTO: 9.5 FL (ref 5–10.5)
POTASSIUM SERPL-SCNC: 3 MMOL/L (ref 3.5–5.1)
PROTHROMBIN TIME: 18.2 SEC (ref 10–13.2)
RBC # BLD: 2.96 M/UL (ref 4.2–5.9)
RBC FLUID: <1000 /CUMM
SODIUM BLD-SCNC: 134 MMOL/L (ref 136–145)
TOTAL PROTEIN: 6.9 G/DL (ref 6.4–8.2)
WBC # BLD: 6.5 K/UL (ref 4–11)

## 2020-03-26 PROCEDURE — 85730 THROMBOPLASTIN TIME PARTIAL: CPT

## 2020-03-26 PROCEDURE — 7100000010 HC PHASE II RECOVERY - FIRST 15 MIN

## 2020-03-26 PROCEDURE — 82042 OTHER SOURCE ALBUMIN QUAN EA: CPT

## 2020-03-26 PROCEDURE — 85025 COMPLETE CBC W/AUTO DIFF WBC: CPT

## 2020-03-26 PROCEDURE — 36415 COLL VENOUS BLD VENIPUNCTURE: CPT

## 2020-03-26 PROCEDURE — 80053 COMPREHEN METABOLIC PANEL: CPT

## 2020-03-26 PROCEDURE — 85610 PROTHROMBIN TIME: CPT

## 2020-03-26 PROCEDURE — 49083 ABD PARACENTESIS W/IMAGING: CPT

## 2020-03-26 PROCEDURE — 87205 SMEAR GRAM STAIN: CPT

## 2020-03-26 PROCEDURE — 89051 BODY FLUID CELL COUNT: CPT

## 2020-03-26 PROCEDURE — 87070 CULTURE OTHR SPECIMN AEROBIC: CPT

## 2020-03-26 PROCEDURE — 87015 SPECIMEN INFECT AGNT CONCNTJ: CPT

## 2020-03-26 PROCEDURE — 7100000011 HC PHASE II RECOVERY - ADDTL 15 MIN

## 2020-03-26 ASSESSMENT — PAIN - FUNCTIONAL ASSESSMENT: PAIN_FUNCTIONAL_ASSESSMENT: 0-10

## 2020-03-26 NOTE — PROGRESS NOTES
Pt to PACU from home. Pt to have ultrasound guided paracentesis. Pt is awake alert and oriented. SOB with exertion. Consent obtained. Patients clothing kept with patient.

## 2020-03-26 NOTE — PROGRESS NOTES
Patient denies pain/needs, vss, states he is ready for d/c    Discharge instructions reviewed and understanding verbalized per pt/family with copy given. All home medications/new prescriptions have been reviewed, questions answered and patient/family state understanding.  Medication information sheet provided for new prescriptions received when applicable

## 2020-03-29 LAB
BODY FLUID CULTURE, STERILE: NORMAL
GRAM STAIN RESULT: NORMAL

## 2020-04-02 ENCOUNTER — HOSPITAL ENCOUNTER (OUTPATIENT)
Dept: ULTRASOUND IMAGING | Age: 65
Discharge: HOME | End: 2020-04-02

## 2020-04-02 ENCOUNTER — HOSPITAL ENCOUNTER (OUTPATIENT)
Dept: INTERVENTIONAL RADIOLOGY/VASCULAR | Age: 65
Discharge: HOME | End: 2020-04-02

## 2020-04-02 VITALS
HEART RATE: 87 BPM | DIASTOLIC BLOOD PRESSURE: 60 MMHG | SYSTOLIC BLOOD PRESSURE: 108 MMHG | HEIGHT: 74 IN | OXYGEN SATURATION: 95 % | RESPIRATION RATE: 20 BRPM | WEIGHT: 262.7 LBS | BODY MASS INDEX: 33.71 KG/M2 | TEMPERATURE: 97.2 F

## 2020-04-02 LAB
APTT: 44.9 SEC (ref 24.2–36.2)
INR BLD: 1.59 (ref 0.86–1.14)
PROTHROMBIN TIME: 18.5 SEC (ref 10–13.2)

## 2020-04-02 PROCEDURE — 2500000003 HC RX 250 WO HCPCS: Performed by: RADIOLOGY

## 2020-04-02 PROCEDURE — 85730 THROMBOPLASTIN TIME PARTIAL: CPT

## 2020-04-02 PROCEDURE — 36415 COLL VENOUS BLD VENIPUNCTURE: CPT

## 2020-04-02 PROCEDURE — 49083 ABD PARACENTESIS W/IMAGING: CPT

## 2020-04-02 PROCEDURE — 85610 PROTHROMBIN TIME: CPT

## 2020-04-02 PROCEDURE — 7100000010 HC PHASE II RECOVERY - FIRST 15 MIN

## 2020-04-02 PROCEDURE — C1729 CATH, DRAINAGE: HCPCS

## 2020-04-02 RX ORDER — LIDOCAINE HYDROCHLORIDE 10 MG/ML
5 INJECTION, SOLUTION EPIDURAL; INFILTRATION; INTRACAUDAL; PERINEURAL ONCE
Status: COMPLETED | OUTPATIENT
Start: 2020-04-02 | End: 2020-04-02

## 2020-04-02 RX ADMIN — LIDOCAINE HYDROCHLORIDE 5 ML: 10 INJECTION, SOLUTION EPIDURAL; INFILTRATION; INTRACAUDAL; PERINEURAL at 14:54

## 2020-04-02 ASSESSMENT — PAIN - FUNCTIONAL ASSESSMENT: PAIN_FUNCTIONAL_ASSESSMENT: 0-10

## 2020-04-09 ENCOUNTER — HOSPITAL ENCOUNTER (OUTPATIENT)
Dept: ULTRASOUND IMAGING | Age: 65
Discharge: HOME | End: 2020-04-09

## 2020-04-09 VITALS
OXYGEN SATURATION: 100 % | BODY MASS INDEX: 33.62 KG/M2 | WEIGHT: 262 LBS | HEIGHT: 74 IN | DIASTOLIC BLOOD PRESSURE: 77 MMHG | SYSTOLIC BLOOD PRESSURE: 127 MMHG | HEART RATE: 89 BPM | TEMPERATURE: 97.8 F | RESPIRATION RATE: 18 BRPM

## 2020-04-09 LAB
INR BLD: 1.54 (ref 0.86–1.14)
PROTHROMBIN TIME: 17.9 SEC (ref 10–13.2)

## 2020-04-09 PROCEDURE — 85610 PROTHROMBIN TIME: CPT

## 2020-04-09 PROCEDURE — 49083 ABD PARACENTESIS W/IMAGING: CPT

## 2020-04-09 PROCEDURE — 7100000010 HC PHASE II RECOVERY - FIRST 15 MIN

## 2020-04-09 PROCEDURE — 36415 COLL VENOUS BLD VENIPUNCTURE: CPT

## 2020-04-09 ASSESSMENT — PAIN - FUNCTIONAL ASSESSMENT: PAIN_FUNCTIONAL_ASSESSMENT: 0-10

## 2020-04-09 NOTE — BRIEF OP NOTE
Brief Postoperative Note    Simran Garcia  YOB: 1955  [id number]    Pre-operative Diagnosis: ascites    Post-operative Diagnosis: Same    Procedure: paracentesis    Anesthesia: Local    Surgeons: Km Cerda MD    Estimated Blood Loss: Less than 5 mL    Complications: None    Specimens: Was Not Obtained    Findings: Successful US-guided paracentesis.     Electronically signed by Km Cerda MD on 4/9/2020 at 1:51 PM

## 2020-04-16 ENCOUNTER — HOSPITAL ENCOUNTER (OUTPATIENT)
Dept: ULTRASOUND IMAGING | Age: 65
Discharge: HOME | End: 2020-04-16

## 2020-04-16 VITALS
HEART RATE: 85 BPM | OXYGEN SATURATION: 100 % | TEMPERATURE: 97 F | DIASTOLIC BLOOD PRESSURE: 71 MMHG | RESPIRATION RATE: 18 BRPM | SYSTOLIC BLOOD PRESSURE: 117 MMHG

## 2020-04-16 LAB
APTT: 44.1 SEC (ref 24.2–36.2)
HCT VFR BLD CALC: 32.3 % (ref 40.5–52.5)
HEMOGLOBIN: 10.9 G/DL (ref 13.5–17.5)
INR BLD: 1.54 (ref 0.86–1.14)
MCH RBC QN AUTO: 36.3 PG (ref 26–34)
MCHC RBC AUTO-ENTMCNC: 33.7 G/DL (ref 31–36)
MCV RBC AUTO: 107.7 FL (ref 80–100)
PDW BLD-RTO: 16.7 % (ref 12.4–15.4)
PLATELET # BLD: 82 K/UL (ref 135–450)
PMV BLD AUTO: 9.1 FL (ref 5–10.5)
PROTHROMBIN TIME: 18 SEC (ref 10–13.2)
RBC # BLD: 3 M/UL (ref 4.2–5.9)
WBC # BLD: 6.6 K/UL (ref 4–11)

## 2020-04-16 PROCEDURE — 85730 THROMBOPLASTIN TIME PARTIAL: CPT

## 2020-04-16 PROCEDURE — 36415 COLL VENOUS BLD VENIPUNCTURE: CPT

## 2020-04-16 PROCEDURE — 85027 COMPLETE CBC AUTOMATED: CPT

## 2020-04-16 PROCEDURE — 85610 PROTHROMBIN TIME: CPT

## 2020-04-16 PROCEDURE — 49083 ABD PARACENTESIS W/IMAGING: CPT

## 2020-04-16 PROCEDURE — 7100000010 HC PHASE II RECOVERY - FIRST 15 MIN

## 2020-04-16 RX ORDER — ACETAMINOPHEN 325 MG/1
650 TABLET ORAL EVERY 4 HOURS PRN
Status: DISCONTINUED | OUTPATIENT
Start: 2020-04-16 | End: 2020-04-17 | Stop reason: HOSPADM

## 2020-04-16 ASSESSMENT — PAIN SCALES - GENERAL: PAINLEVEL_OUTOF10: 0

## 2020-04-16 NOTE — BRIEF OP NOTE
Brief Postoperative Note    Constance Khanna  YOB: 1955  [id number]    Pre-operative Diagnosis: ascites    Post-operative Diagnosis: Same    Procedure: paracentesis    Anesthesia: Local    Surgeons/Assistants: Dr. Salima Tam    Estimated Blood Loss: less than 5ml     Complications: None    Specimens: Was Obtained: ascitic fluid    Findings: clear yellow fluid    Electronically signed by Kylie Peace MD on 4/16/2020 at 2:10 PM

## 2020-04-16 NOTE — PROGRESS NOTES
Pt has all belongings  PT [address] [place] [address], PT IN STABLE CONDITION, TRANSPORTED TO CAR VIA WHEELCHAIR WITH kaleigh

## 2020-04-23 ENCOUNTER — HOSPITAL ENCOUNTER (OUTPATIENT)
Dept: INTERVENTIONAL RADIOLOGY/VASCULAR | Age: 65
Discharge: HOME | End: 2020-04-23

## 2020-04-23 VITALS
SYSTOLIC BLOOD PRESSURE: 103 MMHG | TEMPERATURE: 98 F | HEART RATE: 82 BPM | OXYGEN SATURATION: 96 % | RESPIRATION RATE: 18 BRPM | DIASTOLIC BLOOD PRESSURE: 70 MMHG

## 2020-04-23 LAB
ANION GAP SERPL CALCULATED.3IONS-SCNC: 8 MMOL/L (ref 3–16)
APTT: 43.9 SEC (ref 24.2–36.2)
BASOPHILS ABSOLUTE: 0.1 K/UL (ref 0–0.2)
BASOPHILS RELATIVE PERCENT: 0.9 %
BUN BLDV-MCNC: 9 MG/DL (ref 7–20)
CALCIUM SERPL-MCNC: 7.9 MG/DL (ref 8.3–10.6)
CHLORIDE BLD-SCNC: 99 MMOL/L (ref 99–110)
CO2: 27 MMOL/L (ref 21–32)
CREAT SERPL-MCNC: 0.9 MG/DL (ref 0.8–1.3)
EOSINOPHILS ABSOLUTE: 0.2 K/UL (ref 0–0.6)
EOSINOPHILS RELATIVE PERCENT: 2.7 %
GFR AFRICAN AMERICAN: >60
GFR NON-AFRICAN AMERICAN: >60
GLUCOSE BLD-MCNC: 154 MG/DL (ref 70–99)
HCT VFR BLD CALC: 32.2 % (ref 40.5–52.5)
HEMOGLOBIN: 10.7 G/DL (ref 13.5–17.5)
INR BLD: 1.5 (ref 0.86–1.14)
LYMPHOCYTES ABSOLUTE: 1.7 K/UL (ref 1–5.1)
LYMPHOCYTES RELATIVE PERCENT: 23.9 %
MCH RBC QN AUTO: 35.8 PG (ref 26–34)
MCHC RBC AUTO-ENTMCNC: 33.3 G/DL (ref 31–36)
MCV RBC AUTO: 107.6 FL (ref 80–100)
MONOCYTES ABSOLUTE: 1.3 K/UL (ref 0–1.3)
MONOCYTES RELATIVE PERCENT: 18.9 %
NEUTROPHILS ABSOLUTE: 3.8 K/UL (ref 1.7–7.7)
NEUTROPHILS RELATIVE PERCENT: 53.6 %
PDW BLD-RTO: 17 % (ref 12.4–15.4)
PLATELET # BLD: 87 K/UL (ref 135–450)
PMV BLD AUTO: 9 FL (ref 5–10.5)
POTASSIUM SERPL-SCNC: 4.1 MMOL/L (ref 3.5–5.1)
PROTHROMBIN TIME: 17.5 SEC (ref 10–13.2)
RBC # BLD: 2.99 M/UL (ref 4.2–5.9)
SODIUM BLD-SCNC: 134 MMOL/L (ref 136–145)
WBC # BLD: 7.1 K/UL (ref 4–11)

## 2020-04-23 PROCEDURE — 85610 PROTHROMBIN TIME: CPT

## 2020-04-23 PROCEDURE — 7100000010 HC PHASE II RECOVERY - FIRST 15 MIN

## 2020-04-23 PROCEDURE — 36415 COLL VENOUS BLD VENIPUNCTURE: CPT

## 2020-04-23 PROCEDURE — 49083 ABD PARACENTESIS W/IMAGING: CPT

## 2020-04-23 PROCEDURE — 85730 THROMBOPLASTIN TIME PARTIAL: CPT

## 2020-04-23 PROCEDURE — 85025 COMPLETE CBC W/AUTO DIFF WBC: CPT

## 2020-04-23 PROCEDURE — 80048 BASIC METABOLIC PNL TOTAL CA: CPT

## 2020-04-23 PROCEDURE — 2500000003 HC RX 250 WO HCPCS: Performed by: RADIOLOGY

## 2020-04-23 PROCEDURE — C1729 CATH, DRAINAGE: HCPCS

## 2020-04-23 PROCEDURE — 7100000011 HC PHASE II RECOVERY - ADDTL 15 MIN

## 2020-04-23 RX ORDER — LIDOCAINE HYDROCHLORIDE 10 MG/ML
20 INJECTION, SOLUTION EPIDURAL; INFILTRATION; INTRACAUDAL; PERINEURAL ONCE
Status: COMPLETED | OUTPATIENT
Start: 2020-04-23 | End: 2020-04-23

## 2020-04-23 RX ADMIN — LIDOCAINE HYDROCHLORIDE 8 ML: 10 INJECTION, SOLUTION EPIDURAL; INFILTRATION; INTRACAUDAL; PERINEURAL at 10:10

## 2020-04-23 NOTE — PROGRESS NOTES
6000ml of fluid removed  Spoke to patients RN and advised her the amount of fluid removed and that patient was returning to the SDS.

## 2020-04-30 ENCOUNTER — HOSPITAL ENCOUNTER (OUTPATIENT)
Dept: INTERVENTIONAL RADIOLOGY/VASCULAR | Age: 65
Discharge: HOME | End: 2020-04-30

## 2020-04-30 VITALS
TEMPERATURE: 97.4 F | HEART RATE: 82 BPM | RESPIRATION RATE: 18 BRPM | OXYGEN SATURATION: 100 % | SYSTOLIC BLOOD PRESSURE: 104 MMHG | DIASTOLIC BLOOD PRESSURE: 70 MMHG

## 2020-04-30 LAB
APTT: 41.5 SEC (ref 24.2–36.2)
INR BLD: 1.41 (ref 0.86–1.14)
PROTHROMBIN TIME: 16.4 SEC (ref 10–13.2)

## 2020-04-30 PROCEDURE — 49083 ABD PARACENTESIS W/IMAGING: CPT

## 2020-04-30 PROCEDURE — 85730 THROMBOPLASTIN TIME PARTIAL: CPT

## 2020-04-30 PROCEDURE — C1729 CATH, DRAINAGE: HCPCS

## 2020-04-30 PROCEDURE — 36415 COLL VENOUS BLD VENIPUNCTURE: CPT

## 2020-04-30 PROCEDURE — 2500000003 HC RX 250 WO HCPCS: Performed by: RADIOLOGY

## 2020-04-30 PROCEDURE — 7100000011 HC PHASE II RECOVERY - ADDTL 15 MIN

## 2020-04-30 PROCEDURE — 7100000010 HC PHASE II RECOVERY - FIRST 15 MIN

## 2020-04-30 PROCEDURE — 85610 PROTHROMBIN TIME: CPT

## 2020-04-30 RX ORDER — LIDOCAINE HYDROCHLORIDE 10 MG/ML
10 INJECTION, SOLUTION EPIDURAL; INFILTRATION; INTRACAUDAL; PERINEURAL ONCE
Status: COMPLETED | OUTPATIENT
Start: 2020-04-30 | End: 2020-04-30

## 2020-04-30 RX ADMIN — LIDOCAINE HYDROCHLORIDE 10 ML: 10 INJECTION, SOLUTION EPIDURAL; INFILTRATION; INTRACAUDAL; PERINEURAL at 10:57

## 2020-04-30 NOTE — PROGRESS NOTES
Pt arrived from radiology in stable condition. VSS. Paracentesis site clean dry and intact. Awaiting discharge order. Call light in reach. Will continue to monitor.

## 2020-05-07 ENCOUNTER — HOSPITAL ENCOUNTER (OUTPATIENT)
Dept: ULTRASOUND IMAGING | Age: 65
Discharge: HOME | End: 2020-05-07

## 2020-05-07 VITALS
RESPIRATION RATE: 18 BRPM | TEMPERATURE: 96.9 F | SYSTOLIC BLOOD PRESSURE: 120 MMHG | HEART RATE: 85 BPM | DIASTOLIC BLOOD PRESSURE: 73 MMHG | OXYGEN SATURATION: 96 %

## 2020-05-07 LAB
APTT: 41.4 SEC (ref 24.2–36.2)
INR BLD: 1.47 (ref 0.86–1.14)
PROTHROMBIN TIME: 17.1 SEC (ref 10–13.2)

## 2020-05-07 PROCEDURE — 85610 PROTHROMBIN TIME: CPT

## 2020-05-07 PROCEDURE — 36415 COLL VENOUS BLD VENIPUNCTURE: CPT

## 2020-05-07 PROCEDURE — 85730 THROMBOPLASTIN TIME PARTIAL: CPT

## 2020-05-07 PROCEDURE — 49083 ABD PARACENTESIS W/IMAGING: CPT

## 2020-05-07 PROCEDURE — 7100000010 HC PHASE II RECOVERY - FIRST 15 MIN

## 2020-05-14 ENCOUNTER — HOSPITAL ENCOUNTER (OUTPATIENT)
Dept: ULTRASOUND IMAGING | Age: 65
Discharge: HOME | End: 2020-05-14

## 2020-05-14 VITALS
SYSTOLIC BLOOD PRESSURE: 105 MMHG | HEART RATE: 78 BPM | TEMPERATURE: 97 F | RESPIRATION RATE: 20 BRPM | DIASTOLIC BLOOD PRESSURE: 74 MMHG | OXYGEN SATURATION: 97 %

## 2020-05-14 LAB
APTT: 42.2 SEC (ref 24.2–36.2)
INR BLD: 1.44 (ref 0.86–1.14)
PROTHROMBIN TIME: 16.8 SEC (ref 10–13.2)

## 2020-05-14 PROCEDURE — 2500000003 HC RX 250 WO HCPCS: Performed by: INTERNAL MEDICINE

## 2020-05-14 PROCEDURE — 85730 THROMBOPLASTIN TIME PARTIAL: CPT

## 2020-05-14 PROCEDURE — 49083 ABD PARACENTESIS W/IMAGING: CPT

## 2020-05-14 PROCEDURE — 85610 PROTHROMBIN TIME: CPT

## 2020-05-14 PROCEDURE — 7100000010 HC PHASE II RECOVERY - FIRST 15 MIN

## 2020-05-14 PROCEDURE — 36415 COLL VENOUS BLD VENIPUNCTURE: CPT

## 2020-05-14 RX ORDER — LIDOCAINE HYDROCHLORIDE 10 MG/ML
5 INJECTION, SOLUTION INFILTRATION; PERINEURAL ONCE
Status: COMPLETED | OUTPATIENT
Start: 2020-05-14 | End: 2020-05-14

## 2020-05-14 RX ORDER — BACITRACIN, NEOMYCIN, POLYMYXIN B 400; 3.5; 5 [USP'U]/G; MG/G; [USP'U]/G
OINTMENT TOPICAL ONCE
Status: COMPLETED | OUTPATIENT
Start: 2020-05-14 | End: 2020-05-14

## 2020-05-14 RX ADMIN — LIDOCAINE HYDROCHLORIDE 5 ML: 10 INJECTION, SOLUTION INFILTRATION; PERINEURAL at 14:28

## 2020-05-14 RX ADMIN — BACITRACIN, NEOMYCIN, POLYMYXIN B: 400; 3.5; 5 OINTMENT TOPICAL at 14:48

## 2020-05-14 ASSESSMENT — PAIN SCALES - GENERAL: PAINLEVEL_OUTOF10: 0

## 2020-05-14 NOTE — BRIEF OP NOTE
Brief Postoperative Note    Anamaria Amor  YOB: 1955  [id number]    Pre-operative Diagnosis: ascites    Post-operative Diagnosis: Same    Procedure: paracentesis    Anesthesia: Local    Surgeons: Bryan Eubanks MD    Estimated Blood Loss: Less than 5 mL    Complications: None    Specimens: Was Not Obtained    Findings: Successful US-guided paracentesis.     Electronically signed by Bryan Eubanks MD on 5/14/2020 at 2:29 PM

## 2020-05-21 ENCOUNTER — HOSPITAL ENCOUNTER (OUTPATIENT)
Dept: ULTRASOUND IMAGING | Age: 65
Discharge: HOME | End: 2020-05-21

## 2020-05-21 VITALS
SYSTOLIC BLOOD PRESSURE: 119 MMHG | DIASTOLIC BLOOD PRESSURE: 73 MMHG | HEART RATE: 69 BPM | TEMPERATURE: 97.4 F | OXYGEN SATURATION: 98 % | RESPIRATION RATE: 16 BRPM

## 2020-05-21 LAB
APTT: 43.8 SEC (ref 24.2–36.2)
INR BLD: 1.4 (ref 0.86–1.14)
PROTHROMBIN TIME: 16.3 SEC (ref 10–13.2)

## 2020-05-21 PROCEDURE — 85610 PROTHROMBIN TIME: CPT

## 2020-05-21 PROCEDURE — 2500000003 HC RX 250 WO HCPCS: Performed by: INTERNAL MEDICINE

## 2020-05-21 PROCEDURE — 36415 COLL VENOUS BLD VENIPUNCTURE: CPT

## 2020-05-21 PROCEDURE — 85730 THROMBOPLASTIN TIME PARTIAL: CPT

## 2020-05-21 PROCEDURE — 49083 ABD PARACENTESIS W/IMAGING: CPT

## 2020-05-21 RX ORDER — LIDOCAINE HYDROCHLORIDE 10 MG/ML
5 INJECTION, SOLUTION INFILTRATION; PERINEURAL ONCE
Status: COMPLETED | OUTPATIENT
Start: 2020-05-21 | End: 2020-05-21

## 2020-05-21 RX ORDER — BACITRACIN, NEOMYCIN, POLYMYXIN B 400; 3.5; 5 [USP'U]/G; MG/G; [USP'U]/G
OINTMENT TOPICAL ONCE
Status: COMPLETED | OUTPATIENT
Start: 2020-05-21 | End: 2020-05-21

## 2020-05-21 RX ADMIN — LIDOCAINE HYDROCHLORIDE 5 ML: 10 INJECTION, SOLUTION INFILTRATION; PERINEURAL at 13:50

## 2020-05-21 RX ADMIN — BACITRACIN, NEOMYCIN, POLYMYXIN B: 400; 3.5; 5 OINTMENT TOPICAL at 14:14

## 2020-05-21 ASSESSMENT — PAIN SCALES - GENERAL: PAINLEVEL_OUTOF10: 0

## 2020-05-21 ASSESSMENT — PAIN - FUNCTIONAL ASSESSMENT: PAIN_FUNCTIONAL_ASSESSMENT: 0-10

## 2020-05-28 ENCOUNTER — OFFICE VISIT (OUTPATIENT)
Dept: PRIMARY CARE CLINIC | Age: 65
End: 2020-05-28

## 2020-05-28 ENCOUNTER — HOSPITAL ENCOUNTER (OUTPATIENT)
Dept: ULTRASOUND IMAGING | Age: 65
Discharge: HOME | End: 2020-05-28

## 2020-05-28 VITALS — HEART RATE: 73 BPM | TEMPERATURE: 98.3 F | OXYGEN SATURATION: 96 %

## 2020-05-28 NOTE — PROGRESS NOTES
Pt given prescription for Covid test and discharged home. All personal belongings sent home with pt.

## 2020-05-28 NOTE — PATIENT INSTRUCTIONS
facility. These steps will help the healthcare provider's office to keep other people in the office or waiting room from getting infected or exposed. ; Alert health department: Ask your healthcare provider to call the local or state health department. Persons who are placed under active monitoring or facilitated self-monitoring should follow instructions provided by their local health department or occupational health professionals, as appropriate.  ; Call 911 if you have a medical emergency: If you have a medical emergency and need to call 911, notify the dispatch personnel that you have, or are being evaluated for COVID-19. If possible, put on a facemask before emergency medical services arrive. Thank you for enrolling in [address] [facility]. Please follow the instructions below to securely access your online medical record. [facility] allows you to send messages to your doctor, view your test results, renew your prescriptions, schedule appointments, and more. How Do I Sign Up? 1. In your Internet browser, go to [email or web address]  2. Click on the Sign Up Now link in the Sign In box. You will see the New Member Sign Up page. 3. Enter your [facility] Access Code exactly as it appears below. You will not need to use this code after youve completed the sign-up process. If you do not sign up before the expiration date, you must request a new code. [facility] Access Code: [email or web address]  Expires: 6/21/2020  1:08 PM    4. Enter your Social Security Number ([id number]) and Date of Birth (mm/dd/yyyy) as indicated and click Submit. You will be taken to the next sign-up page. 5. Create a [facility] ID. This will be your [facility] login ID and cannot be changed, so think of one that is secure and easy to remember. 6. Create a [facility] password. You can change your password at any time. 7. Enter your Password Reset Question and Answer. This can be used at a later time if you forget your password.    8. Enter your e-mail address. You will receive e-mail notification when new information is available in [address] 9. Click Sign Up. You can now view your medical record. Additional Information  If you have questions, please contact your physician practice where you receive care. Remember, MyChart is NOT to be used for urgent needs. For medical emergencies, dial 911.

## 2020-05-31 LAB
SARS-COV-2: NOT DETECTED
SOURCE: NORMAL

## 2020-06-04 ENCOUNTER — HOSPITAL ENCOUNTER (OUTPATIENT)
Dept: ULTRASOUND IMAGING | Age: 65
Discharge: HOME | End: 2020-06-04

## 2020-06-04 VITALS
OXYGEN SATURATION: 96 % | TEMPERATURE: 97 F | DIASTOLIC BLOOD PRESSURE: 66 MMHG | HEART RATE: 74 BPM | BODY MASS INDEX: 31.88 KG/M2 | RESPIRATION RATE: 16 BRPM | SYSTOLIC BLOOD PRESSURE: 104 MMHG | WEIGHT: 248.31 LBS

## 2020-06-04 LAB
APTT: 44.8 SEC (ref 24.2–36.2)
INR BLD: 1.5 (ref 0.86–1.14)
PROTHROMBIN TIME: 17.5 SEC (ref 10–13.2)

## 2020-06-04 PROCEDURE — 85730 THROMBOPLASTIN TIME PARTIAL: CPT

## 2020-06-04 PROCEDURE — 2500000003 HC RX 250 WO HCPCS: Performed by: INTERNAL MEDICINE

## 2020-06-04 PROCEDURE — 6370000000 HC RX 637 (ALT 250 FOR IP): Performed by: INTERNAL MEDICINE

## 2020-06-04 PROCEDURE — 85610 PROTHROMBIN TIME: CPT

## 2020-06-04 PROCEDURE — 49083 ABD PARACENTESIS W/IMAGING: CPT

## 2020-06-04 PROCEDURE — 7100000010 HC PHASE II RECOVERY - FIRST 15 MIN

## 2020-06-04 PROCEDURE — 7100000011 HC PHASE II RECOVERY - ADDTL 15 MIN

## 2020-06-04 PROCEDURE — 36415 COLL VENOUS BLD VENIPUNCTURE: CPT

## 2020-06-04 RX ORDER — LIDOCAINE HYDROCHLORIDE 10 MG/ML
5 INJECTION, SOLUTION EPIDURAL; INFILTRATION; INTRACAUDAL; PERINEURAL ONCE
Status: COMPLETED | OUTPATIENT
Start: 2020-06-04 | End: 2020-06-04

## 2020-06-04 RX ADMIN — LIDOCAINE HYDROCHLORIDE 5 ML: 10 INJECTION, SOLUTION EPIDURAL; INFILTRATION; INTRACAUDAL; PERINEURAL at 13:00

## 2020-06-04 RX ADMIN — NEOMYCIN AND POLYMYXIN B SULFATES, BACITRACIN ZINC, AND HYDROCORTISONE: 3.5; 5000; 400; 1 OINTMENT TOPICAL at 13:25

## 2020-06-04 ASSESSMENT — PAIN - FUNCTIONAL ASSESSMENT: PAIN_FUNCTIONAL_ASSESSMENT: 0-10

## 2020-06-04 NOTE — PROGRESS NOTES
Pt discharged home per private vehicle  Wheeled to front of the hospital with all belongings, reviewed D/C instructions and verbalized understanding

## 2020-06-11 ENCOUNTER — HOSPITAL ENCOUNTER (OUTPATIENT)
Dept: ULTRASOUND IMAGING | Age: 65
Discharge: HOME | End: 2020-06-11

## 2020-06-11 ENCOUNTER — OFFICE VISIT (OUTPATIENT)
Dept: PRIMARY CARE CLINIC | Age: 65
End: 2020-06-11

## 2020-06-11 NOTE — PATIENT INSTRUCTIONS
Steps to help prevent the spread of COVID-19 if you are sick  SOURCE - https://felipe-roque.info/. html     Stay home except to get medical care   ; Stay home: People who are mildly ill with COVID-19 are able to isolate at home during their illness. You should restrict activities outside your home, except for getting medical care.   ; Avoid public areas: Do not go to work, school, or public areas.   ; Avoid public transportation: Avoid using public transportation, ride-sharing, or taxis.  ; Separate yourself from other people and animals in your home   ; Stay away from others: As much as possible, you should stay in a specific room and away from other people in your home. Also, you should use a separate bathroom, if available.   ; Limit contact with pets & animals: You should restrict contact with pets and other animals while you are sick with COVID-19, just like you would around other people. Although there have not been reports of pets or other animals becoming sick with COVID-19, it is still recommended that people sick with COVID-19 limit contact with animals until more information is known about the virus. ; When possible, have another member of your household care for your animals while you are sick. If you are sick with COVID-19, avoid contact with your pet, including petting, snuggling, being kissed or licked, and sharing food. If you must care for your pet or be around animals while you are sick, wash your hands before and after you interact with pets and wear a facemask. See COVID-19 and Animals for more information. Other considerations   The ill person should eat/be fed in their room if possible. Non-disposable  items used should be handled with gloves and washed with hot water or in a . Clean hands after handling used  items.  If possible, dedicate a lined trash can for the ill person.  Use gloves when removing garbage bags, handling, and disposing of trash. Wash hands after handling or disposing of trash.  Consider consulting with your local health department about trash disposal guidance if available. Information for Household Members and Caregivers of Someone who is Sick   Call ahead before visiting your doctor   Call ahead: If you have a medical appointment, call the healthcare provider and tell them that you have or may have COVID-19. This will help the healthcare provider's office take steps to keep other people from getting infected or exposed. Wear a facemask if you are sick   ; If you are sick: You should wear a facemask when you are around other people (e.g., sharing a room or vehicle) or pets and before you enter a healthcare provider's office. ; If you are caring for others: If the person who is sick is not able to wear a facemask (for example, because it causes trouble breathing), then people who live with the person who is sick should not stay in the same room with them, or they should wear a facemask if they enter a room with the person who is sick. Cover your coughs and sneezes   ; Cover: Cover your mouth and nose with a tissue when you cough or sneeze.   ; Dispose: Throw used tissues in a lined trash can.   ; Wash hands: Immediately wash your hands with soap and water for at least 20 seconds or, if soap and water are not available, clean your hands with an alcohol-based hand  that contains at least 60% alcohol. Clean your hands often   ;  Wash hands: Wash your hands often with soap and water for at least 20 seconds, especially after blowing your nose, coughing, or sneezing; going to the bathroom; and before eating or preparing food.   ; Hand : If soap and water are not readily available, use an alcohol-based hand  with at least 60% alcohol, covering all surfaces of your hands and rubbing them together until they feel dry.   ; Soap and water: Soap and water are the best option if hands are visibly dirty.   ; Avoid touching: Avoid touching your eyes, nose, and mouth with unwashed hands. Handwashing Tips   ; Wet your hands with clean, running water (warm or cold), turn off the tap, and apply soap.  ; Lather your hands by rubbing them together with the soap. Lather the backs of your hands, between your fingers, and under your nails. ; Scrub your hands for at least 20 seconds. Need a timer? Hum the [place] from beginning to end twice.  ; Rinse your hands well under clean, running water.  ; Dry your hands using a clean towel or air dry them. Avoid sharing personal household items   ; Do not share: You should not share dishes, drinking glasses, cups, eating utensils, towels, or bedding with other people or pets in your home.   ; Wash thoroughly after use: After using these items, they should be washed thoroughly with soap and water. Clean all high-touch surfaces everyday   ; Clean and disinfect: Practice routine cleaning of high touch surfaces.  ; High touch surfaces include counters, tabletops, doorknobs, bathroom fixtures, toilets, phones, keyboards, tablets, and bedside tables.  ; Disinfect areas with bodily fluids: Also, clean any surfaces that may have blood, stool, or body fluids on them.   ; Household : Use a household cleaning spray or wipe, according to the label instructions. Labels contain instructions for safe and effective use of the cleaning product including precautions you should take when applying the product, such as wearing gloves and making sure you have good ventilation during use of the product.     Monitor your symptoms   Seek medical attention: Seek prompt medical attention if your illness is worsening     (e.g., difficulty breathing).   ; Call your doctor: Before seeking care, call your healthcare provider and tell them that you have, or are being evaluated for, COVID-19.   ; Wear a facemask when sick: Put on a facemask before you enter the

## 2020-06-14 LAB
SARS-COV-2: NOT DETECTED
SOURCE: NORMAL

## 2020-06-18 ENCOUNTER — HOSPITAL ENCOUNTER (OUTPATIENT)
Dept: ULTRASOUND IMAGING | Age: 65
Discharge: HOME | End: 2020-06-18

## 2020-06-18 ENCOUNTER — HOSPITAL ENCOUNTER (OUTPATIENT)
Dept: INTERVENTIONAL RADIOLOGY/VASCULAR | Age: 65
Discharge: HOME | End: 2020-06-18

## 2020-06-18 VITALS
HEART RATE: 74 BPM | SYSTOLIC BLOOD PRESSURE: 122 MMHG | RESPIRATION RATE: 15 BRPM | TEMPERATURE: 97.6 F | OXYGEN SATURATION: 97 % | DIASTOLIC BLOOD PRESSURE: 71 MMHG

## 2020-06-18 LAB
APTT: 44.4 SEC (ref 24.2–36.2)
INR BLD: 1.6 (ref 0.86–1.14)
PROTHROMBIN TIME: 18.6 SEC (ref 10–13.2)

## 2020-06-18 PROCEDURE — 36415 COLL VENOUS BLD VENIPUNCTURE: CPT

## 2020-06-18 PROCEDURE — 85610 PROTHROMBIN TIME: CPT

## 2020-06-18 PROCEDURE — 85730 THROMBOPLASTIN TIME PARTIAL: CPT

## 2020-06-18 PROCEDURE — C1729 CATH, DRAINAGE: HCPCS

## 2020-06-18 PROCEDURE — 2500000003 HC RX 250 WO HCPCS: Performed by: RADIOLOGY

## 2020-06-18 PROCEDURE — 7100000011 HC PHASE II RECOVERY - ADDTL 15 MIN

## 2020-06-18 PROCEDURE — 49083 ABD PARACENTESIS W/IMAGING: CPT

## 2020-06-18 PROCEDURE — 7100000010 HC PHASE II RECOVERY - FIRST 15 MIN

## 2020-06-18 RX ORDER — LIDOCAINE HYDROCHLORIDE 10 MG/ML
5 INJECTION, SOLUTION EPIDURAL; INFILTRATION; INTRACAUDAL; PERINEURAL ONCE
Status: COMPLETED | OUTPATIENT
Start: 2020-06-18 | End: 2020-06-18

## 2020-06-18 RX ADMIN — LIDOCAINE HYDROCHLORIDE 5 ML: 10 INJECTION, SOLUTION EPIDURAL; INFILTRATION; INTRACAUDAL; PERINEURAL at 13:56

## 2020-06-18 NOTE — PROGRESS NOTES
Pt arrived to same day from radiology post paracentesis, site to abd c/d/i. Pt a&o x4, VSS. Will continue to monitor.

## 2020-06-25 ENCOUNTER — HOSPITAL ENCOUNTER (OUTPATIENT)
Dept: ULTRASOUND IMAGING | Age: 65
Discharge: HOME | End: 2020-06-25

## 2020-06-25 VITALS
SYSTOLIC BLOOD PRESSURE: 119 MMHG | TEMPERATURE: 97 F | HEART RATE: 67 BPM | RESPIRATION RATE: 18 BRPM | DIASTOLIC BLOOD PRESSURE: 73 MMHG | OXYGEN SATURATION: 96 %

## 2020-06-25 LAB
ALBUMIN FLUID: 0.3 G/DL
APPEARANCE FLUID: CLEAR
APTT: 41.5 SEC (ref 24.2–36.2)
CELL COUNT FLUID TYPE: NORMAL
CLOT EVALUATION: NORMAL
COLOR FLUID: YELLOW
FLUID PATH CONSULT: YES
FLUID TYPE: NORMAL
INR BLD: 1.4 (ref 0.86–1.14)
LYMPHOCYTES, BODY FLUID: 19 %
MACROPHAGE FLUID: 73 %
MESOTHELIAL FLUID: 2 %
MONOCYTE, FLUID: 4 %
NEUTROPHIL, FLUID: 2 %
NUCLEATED CELLS FLUID: 64 /CUMM
NUMBER OF CELLS COUNTED FLUID: 100
PROTHROMBIN TIME: 16.3 SEC (ref 10–13.2)
RBC FLUID: <1000 /CUMM

## 2020-06-25 PROCEDURE — 87205 SMEAR GRAM STAIN: CPT

## 2020-06-25 PROCEDURE — 7100000011 HC PHASE II RECOVERY - ADDTL 15 MIN

## 2020-06-25 PROCEDURE — 85610 PROTHROMBIN TIME: CPT

## 2020-06-25 PROCEDURE — 36415 COLL VENOUS BLD VENIPUNCTURE: CPT

## 2020-06-25 PROCEDURE — 85730 THROMBOPLASTIN TIME PARTIAL: CPT

## 2020-06-25 PROCEDURE — 87070 CULTURE OTHR SPECIMN AEROBIC: CPT

## 2020-06-25 PROCEDURE — 49083 ABD PARACENTESIS W/IMAGING: CPT

## 2020-06-25 PROCEDURE — 82042 OTHER SOURCE ALBUMIN QUAN EA: CPT

## 2020-06-25 PROCEDURE — 89051 BODY FLUID CELL COUNT: CPT

## 2020-06-25 PROCEDURE — 7100000010 HC PHASE II RECOVERY - FIRST 15 MIN

## 2020-06-25 ASSESSMENT — PAIN - FUNCTIONAL ASSESSMENT: PAIN_FUNCTIONAL_ASSESSMENT: 0-10

## 2020-06-25 NOTE — PROGRESS NOTES
Pt arrived to same day from radiology/ultrasound post paracentesis, site c/d/i. Pt a&o x4, VSS. Will continue to monitor.

## 2020-06-28 LAB
BODY FLUID CULTURE, STERILE: NORMAL
GRAM STAIN RESULT: NORMAL

## 2020-06-29 LAB — PATH CONSULT FLUID: NORMAL

## 2020-07-02 ENCOUNTER — HOSPITAL ENCOUNTER (OUTPATIENT)
Dept: INTERVENTIONAL RADIOLOGY/VASCULAR | Age: 65
Discharge: HOME | End: 2020-07-02

## 2020-07-02 VITALS
RESPIRATION RATE: 18 BRPM | SYSTOLIC BLOOD PRESSURE: 104 MMHG | DIASTOLIC BLOOD PRESSURE: 68 MMHG | TEMPERATURE: 97.2 F | OXYGEN SATURATION: 96 % | HEART RATE: 78 BPM

## 2020-07-02 LAB
APTT: 45.3 SEC (ref 24.2–36.2)
INR BLD: 1.6 (ref 0.86–1.14)
PROTHROMBIN TIME: 18.6 SEC (ref 10–13.2)

## 2020-07-02 PROCEDURE — 85610 PROTHROMBIN TIME: CPT

## 2020-07-02 PROCEDURE — C1729 CATH, DRAINAGE: HCPCS

## 2020-07-02 PROCEDURE — 36415 COLL VENOUS BLD VENIPUNCTURE: CPT

## 2020-07-02 PROCEDURE — 49083 ABD PARACENTESIS W/IMAGING: CPT

## 2020-07-02 PROCEDURE — 85730 THROMBOPLASTIN TIME PARTIAL: CPT

## 2020-07-02 PROCEDURE — 2500000003 HC RX 250 WO HCPCS: Performed by: RADIOLOGY

## 2020-07-02 PROCEDURE — 7100000010 HC PHASE II RECOVERY - FIRST 15 MIN

## 2020-07-02 RX ORDER — LIDOCAINE HYDROCHLORIDE 10 MG/ML
10 INJECTION, SOLUTION EPIDURAL; INFILTRATION; INTRACAUDAL; PERINEURAL ONCE
Status: COMPLETED | OUTPATIENT
Start: 2020-07-02 | End: 2020-07-02

## 2020-07-02 RX ADMIN — LIDOCAINE HYDROCHLORIDE 10 ML: 10 INJECTION, SOLUTION EPIDURAL; INFILTRATION; INTRACAUDAL; PERINEURAL at 12:03

## 2020-07-02 NOTE — PROGRESS NOTES
4700ml of fluid removed  Spoke to patients CHANG Andres and advised her the amount of fluid removed and that patient was returning to the floor.

## 2020-07-09 ENCOUNTER — HOSPITAL ENCOUNTER (OUTPATIENT)
Dept: ULTRASOUND IMAGING | Age: 65
Discharge: HOME | End: 2020-07-09

## 2020-07-09 ENCOUNTER — HOSPITAL ENCOUNTER (OUTPATIENT)
Dept: INTERVENTIONAL RADIOLOGY/VASCULAR | Age: 65
Discharge: HOME | End: 2020-07-09

## 2020-07-09 VITALS
DIASTOLIC BLOOD PRESSURE: 57 MMHG | TEMPERATURE: 97.5 F | OXYGEN SATURATION: 96 % | BODY MASS INDEX: 30.17 KG/M2 | SYSTOLIC BLOOD PRESSURE: 90 MMHG | RESPIRATION RATE: 16 BRPM | WEIGHT: 235 LBS | HEART RATE: 69 BPM

## 2020-07-09 LAB
APTT: 45.1 SEC (ref 24.2–36.2)
INR BLD: 1.67 (ref 0.86–1.14)
PROTHROMBIN TIME: 19.5 SEC (ref 10–13.2)

## 2020-07-09 PROCEDURE — 7100000010 HC PHASE II RECOVERY - FIRST 15 MIN

## 2020-07-09 PROCEDURE — 36415 COLL VENOUS BLD VENIPUNCTURE: CPT

## 2020-07-09 PROCEDURE — 49083 ABD PARACENTESIS W/IMAGING: CPT

## 2020-07-09 PROCEDURE — 85730 THROMBOPLASTIN TIME PARTIAL: CPT

## 2020-07-09 PROCEDURE — 2500000003 HC RX 250 WO HCPCS: Performed by: INTERNAL MEDICINE

## 2020-07-09 PROCEDURE — 85610 PROTHROMBIN TIME: CPT

## 2020-07-09 RX ORDER — LIDOCAINE HYDROCHLORIDE 10 MG/ML
5 INJECTION, SOLUTION EPIDURAL; INFILTRATION; INTRACAUDAL; PERINEURAL ONCE
Status: COMPLETED | OUTPATIENT
Start: 2020-07-09 | End: 2020-07-09

## 2020-07-09 RX ADMIN — LIDOCAINE HYDROCHLORIDE 5 ML: 10 INJECTION, SOLUTION EPIDURAL; INFILTRATION; INTRACAUDAL; PERINEURAL at 11:35

## 2020-07-09 ASSESSMENT — PAIN - FUNCTIONAL ASSESSMENT: PAIN_FUNCTIONAL_ASSESSMENT: 0-10

## 2020-07-09 NOTE — BRIEF OP NOTE
Brief Postoperative Note    Kashmir Reed  YOB: 1955  [id number]    Pre-operative Diagnosis: ascites    Post-operative Diagnosis: Same    Procedure: paracentesis    Anesthesia: Local    Surgeons: Amalia Pimentel MD    Estimated Blood Loss: Less than 5 mL    Complications: None    Specimens: Was Not Obtained    Findings: Successful US-guided paracentesis.     Electronically signed by Amalia Pimentel MD on 7/9/2020 at 11:46 AM

## 2020-07-09 NOTE — PROGRESS NOTES
Pt arrived to same day from radiology/ultrasound post paracentesis, site to abd c/d/i. Pt a&o x4, VSS. Will continue to monitor.

## 2020-07-09 NOTE — PROGRESS NOTES
Teaching / education initiated regarding perioperative experience, expectations, and pain management during stay. Patient verbalized understanding.     Lab at bedside for preop labs

## 2020-07-16 ENCOUNTER — HOSPITAL ENCOUNTER (OUTPATIENT)
Dept: ULTRASOUND IMAGING | Age: 65
Discharge: HOME | End: 2020-07-16

## 2020-07-16 LAB
APTT: 44.9 SEC (ref 24.2–36.2)
INR BLD: 1.64 (ref 0.86–1.14)
PROTHROMBIN TIME: 19.1 SEC (ref 10–13.2)

## 2020-07-16 PROCEDURE — 36415 COLL VENOUS BLD VENIPUNCTURE: CPT

## 2020-07-16 PROCEDURE — 85610 PROTHROMBIN TIME: CPT

## 2020-07-16 PROCEDURE — 6370000000 HC RX 637 (ALT 250 FOR IP): Performed by: INTERNAL MEDICINE

## 2020-07-16 PROCEDURE — 2500000003 HC RX 250 WO HCPCS: Performed by: INTERNAL MEDICINE

## 2020-07-16 PROCEDURE — 85730 THROMBOPLASTIN TIME PARTIAL: CPT

## 2020-07-16 PROCEDURE — 49083 ABD PARACENTESIS W/IMAGING: CPT

## 2020-07-16 RX ORDER — BACITRACIN, NEOMYCIN, POLYMYXIN B 400; 3.5; 5 [USP'U]/G; MG/G; [USP'U]/G
OINTMENT TOPICAL ONCE
Status: COMPLETED | OUTPATIENT
Start: 2020-07-16 | End: 2020-07-16

## 2020-07-16 RX ORDER — LIDOCAINE HYDROCHLORIDE 10 MG/ML
5 INJECTION, SOLUTION EPIDURAL; INFILTRATION; INTRACAUDAL; PERINEURAL ONCE
Status: COMPLETED | OUTPATIENT
Start: 2020-07-16 | End: 2020-07-16

## 2020-07-16 RX ADMIN — BACITRACIN ZINC, NEOMYCIN SULFATE, POLYMYXIN B SULFATE: 3.5; 5000; 4 OINTMENT TOPICAL at 15:45

## 2020-07-16 RX ADMIN — LIDOCAINE HYDROCHLORIDE 5 ML: 10 INJECTION, SOLUTION EPIDURAL; INFILTRATION; INTRACAUDAL; PERINEURAL at 14:50

## 2020-07-16 NOTE — FLOWSHEET NOTE
Pt to be discharged, VSS. /72, P 87, SaO2 96%, RR 16.  Pt without complaints.   Discharged in wheelchair to car with family member

## 2020-07-30 ENCOUNTER — HOSPITAL ENCOUNTER (OUTPATIENT)
Dept: INTERVENTIONAL RADIOLOGY/VASCULAR | Age: 65
Discharge: HOME | End: 2020-07-30

## 2020-07-30 VITALS
WEIGHT: 232 LBS | BODY MASS INDEX: 29.79 KG/M2 | SYSTOLIC BLOOD PRESSURE: 102 MMHG | HEART RATE: 86 BPM | OXYGEN SATURATION: 95 % | RESPIRATION RATE: 18 BRPM | DIASTOLIC BLOOD PRESSURE: 71 MMHG | TEMPERATURE: 97 F

## 2020-07-30 LAB
APTT: 42.7 SEC (ref 24.2–36.2)
INR BLD: 1.56 (ref 0.86–1.14)
PROTHROMBIN TIME: 18.2 SEC (ref 10–13.2)

## 2020-07-30 PROCEDURE — 49083 ABD PARACENTESIS W/IMAGING: CPT

## 2020-07-30 PROCEDURE — 7100000010 HC PHASE II RECOVERY - FIRST 15 MIN

## 2020-07-30 PROCEDURE — 7100000011 HC PHASE II RECOVERY - ADDTL 15 MIN

## 2020-07-30 PROCEDURE — 36415 COLL VENOUS BLD VENIPUNCTURE: CPT

## 2020-07-30 PROCEDURE — C1729 CATH, DRAINAGE: HCPCS

## 2020-07-30 PROCEDURE — 85730 THROMBOPLASTIN TIME PARTIAL: CPT

## 2020-07-30 PROCEDURE — 2500000003 HC RX 250 WO HCPCS: Performed by: RADIOLOGY

## 2020-07-30 PROCEDURE — 85610 PROTHROMBIN TIME: CPT

## 2020-07-30 RX ORDER — LIDOCAINE HYDROCHLORIDE 10 MG/ML
5 INJECTION, SOLUTION EPIDURAL; INFILTRATION; INTRACAUDAL; PERINEURAL ONCE
Status: COMPLETED | OUTPATIENT
Start: 2020-07-30 | End: 2020-07-30

## 2020-07-30 RX ADMIN — LIDOCAINE HYDROCHLORIDE 5 ML: 10 INJECTION, SOLUTION EPIDURAL; INFILTRATION; INTRACAUDAL; PERINEURAL at 15:23

## 2020-07-30 NOTE — PROGRESS NOTES
5600ml of fluid removed  Spoke to patients RN trixie and advised her the amount of fluid removed and that patient was returning to the floor.

## 2020-07-30 NOTE — BRIEF OP NOTE
Brief Postoperative Note    Sav Uribe  YOB: 1955  [id number]    Pre-operative Diagnosis: ascites    Post-operative Diagnosis: Same    Procedure: paracentesis    Anesthesia: Local    Surgeons: Latrice Zacarias MD    Estimated Blood Loss: Less than 5 mL    Complications: None    Specimens: Was Not Obtained    Findings: Successful US-guided paracentesis.     Electronically signed by Latrice Zacarias MD on 7/30/2020 at 2:09 PM yes/10/10

## 2020-08-06 ENCOUNTER — HOSPITAL ENCOUNTER (OUTPATIENT)
Dept: INTERVENTIONAL RADIOLOGY/VASCULAR | Age: 65
Discharge: HOME | End: 2020-08-06
Payer: MEDICARE

## 2020-08-06 VITALS
TEMPERATURE: 97.6 F | RESPIRATION RATE: 16 BRPM | DIASTOLIC BLOOD PRESSURE: 73 MMHG | WEIGHT: 228 LBS | SYSTOLIC BLOOD PRESSURE: 97 MMHG | HEART RATE: 91 BPM | BODY MASS INDEX: 29.27 KG/M2 | OXYGEN SATURATION: 98 %

## 2020-08-06 LAB
INR BLD: 1.61 (ref 0.86–1.14)
PROTHROMBIN TIME: 18.8 SEC (ref 10–13.2)

## 2020-08-06 PROCEDURE — 2500000003 HC RX 250 WO HCPCS: Performed by: RADIOLOGY

## 2020-08-06 PROCEDURE — 36415 COLL VENOUS BLD VENIPUNCTURE: CPT

## 2020-08-06 PROCEDURE — 49083 ABD PARACENTESIS W/IMAGING: CPT

## 2020-08-06 PROCEDURE — C1729 CATH, DRAINAGE: HCPCS

## 2020-08-06 PROCEDURE — 7100000011 HC PHASE II RECOVERY - ADDTL 15 MIN

## 2020-08-06 PROCEDURE — 85610 PROTHROMBIN TIME: CPT

## 2020-08-06 PROCEDURE — 7100000010 HC PHASE II RECOVERY - FIRST 15 MIN

## 2020-08-06 RX ORDER — LIDOCAINE HYDROCHLORIDE 10 MG/ML
5 INJECTION, SOLUTION EPIDURAL; INFILTRATION; INTRACAUDAL; PERINEURAL ONCE
Status: COMPLETED | OUTPATIENT
Start: 2020-08-06 | End: 2020-08-06

## 2020-08-06 RX ADMIN — LIDOCAINE HYDROCHLORIDE 5 ML: 10 INJECTION, SOLUTION EPIDURAL; INFILTRATION; INTRACAUDAL; PERINEURAL at 09:38

## 2020-08-06 ASSESSMENT — PAIN SCALES - GENERAL: PAINLEVEL_OUTOF10: 0

## 2020-08-06 ASSESSMENT — PAIN - FUNCTIONAL ASSESSMENT: PAIN_FUNCTIONAL_ASSESSMENT: 0-10

## 2020-08-06 NOTE — PROGRESS NOTES
5000ml of fluid removed  Spoke to patients CHANG Bhatia and advised her the amount of fluid removed and that patient was returning to the floor.

## 2020-08-13 ENCOUNTER — HOSPITAL ENCOUNTER (OUTPATIENT)
Dept: INTERVENTIONAL RADIOLOGY/VASCULAR | Age: 65
Discharge: HOME | End: 2020-08-13
Payer: MEDICARE

## 2020-08-13 ENCOUNTER — HOSPITAL ENCOUNTER (OUTPATIENT)
Dept: ULTRASOUND IMAGING | Age: 65
Discharge: HOME | End: 2020-08-13
Payer: MEDICARE

## 2020-08-13 VITALS
OXYGEN SATURATION: 97 % | RESPIRATION RATE: 20 BRPM | TEMPERATURE: 97 F | HEART RATE: 84 BPM | DIASTOLIC BLOOD PRESSURE: 78 MMHG | WEIGHT: 229.4 LBS | HEIGHT: 74 IN | SYSTOLIC BLOOD PRESSURE: 112 MMHG | BODY MASS INDEX: 29.44 KG/M2

## 2020-08-13 LAB
APTT: 43.8 SEC (ref 24.2–36.2)
INR BLD: 1.56 (ref 0.86–1.14)
PROTHROMBIN TIME: 18.2 SEC (ref 10–13.2)

## 2020-08-13 PROCEDURE — 2500000003 HC RX 250 WO HCPCS: Performed by: RADIOLOGY

## 2020-08-13 PROCEDURE — 7100000010 HC PHASE II RECOVERY - FIRST 15 MIN

## 2020-08-13 PROCEDURE — 85730 THROMBOPLASTIN TIME PARTIAL: CPT

## 2020-08-13 PROCEDURE — C1729 CATH, DRAINAGE: HCPCS

## 2020-08-13 PROCEDURE — 7100000011 HC PHASE II RECOVERY - ADDTL 15 MIN

## 2020-08-13 PROCEDURE — 36415 COLL VENOUS BLD VENIPUNCTURE: CPT

## 2020-08-13 PROCEDURE — 49083 ABD PARACENTESIS W/IMAGING: CPT

## 2020-08-13 PROCEDURE — 85610 PROTHROMBIN TIME: CPT

## 2020-08-13 RX ORDER — LIDOCAINE HYDROCHLORIDE 10 MG/ML
10 INJECTION, SOLUTION EPIDURAL; INFILTRATION; INTRACAUDAL; PERINEURAL ONCE
Status: COMPLETED | OUTPATIENT
Start: 2020-08-13 | End: 2020-08-13

## 2020-08-13 RX ADMIN — LIDOCAINE HYDROCHLORIDE 10 ML: 10 INJECTION, SOLUTION EPIDURAL; INFILTRATION; INTRACAUDAL; PERINEURAL at 09:09

## 2020-08-13 ASSESSMENT — PAIN - FUNCTIONAL ASSESSMENT: PAIN_FUNCTIONAL_ASSESSMENT: 0-10

## 2020-08-13 NOTE — PROGRESS NOTES
Teaching / education initiated regarding perioperative experience, expectations, and pain management during stay. Patient verbalized understanding. Patient awake, alert and oriented. VSS. Denies pain. No distress noted. Labs drawn. Continue to monitor.   Sang Aldridge RN

## 2020-08-13 NOTE — PROGRESS NOTES
5500ml of fluid removed  Spoke to patients RN sameday and advised her the amount of fluid removed and that patient was returning to the floor.

## 2020-08-17 ENCOUNTER — HOSPITAL ENCOUNTER (INPATIENT)
Age: 65
LOS: 12 days | Discharge: HOSPICE | DRG: 871 | End: 2020-08-29
Attending: EMERGENCY MEDICINE | Admitting: HOSPITALIST
Payer: MEDICARE

## 2020-08-17 ENCOUNTER — APPOINTMENT (OUTPATIENT)
Dept: GENERAL RADIOLOGY | Age: 65
DRG: 871 | End: 2020-08-17
Payer: MEDICARE

## 2020-08-17 PROBLEM — A41.9 SEPSIS (HCC): Status: ACTIVE | Noted: 2020-08-17

## 2020-08-17 PROBLEM — N17.9 AKI (ACUTE KIDNEY INJURY) (HCC): Status: ACTIVE | Noted: 2020-08-17

## 2020-08-17 PROBLEM — J18.9 HCAP (HEALTHCARE-ASSOCIATED PNEUMONIA): Status: ACTIVE | Noted: 2020-08-17

## 2020-08-17 LAB
A/G RATIO: 0.3 (ref 1.1–2.2)
ABO/RH: NORMAL
ALBUMIN SERPL-MCNC: 1.8 G/DL (ref 3.4–5)
ALBUMIN SERPL-MCNC: 1.8 G/DL (ref 3.4–5)
ALP BLD-CCNC: 106 U/L (ref 40–129)
ALT SERPL-CCNC: 26 U/L (ref 10–40)
ANION GAP SERPL CALCULATED.3IONS-SCNC: 12 MMOL/L (ref 3–16)
ANION GAP SERPL CALCULATED.3IONS-SCNC: 9 MMOL/L (ref 3–16)
ANTIBODY SCREEN: NORMAL
ANTIBODY SCREEN: NORMAL
APTT: 27.5 SEC (ref 24.2–36.2)
AST SERPL-CCNC: 58 U/L (ref 15–37)
BANDED NEUTROPHILS RELATIVE PERCENT: 2 % (ref 0–7)
BASOPHILS ABSOLUTE: 0 K/UL (ref 0–0.2)
BASOPHILS RELATIVE PERCENT: 0 %
BILIRUB SERPL-MCNC: 10 MG/DL (ref 0–1)
BILIRUBIN URINE: ABNORMAL
BLOOD, URINE: ABNORMAL
BUN BLDV-MCNC: 24 MG/DL (ref 7–20)
BUN BLDV-MCNC: 25 MG/DL (ref 7–20)
CALCIUM SERPL-MCNC: 7.4 MG/DL (ref 8.3–10.6)
CALCIUM SERPL-MCNC: 7.8 MG/DL (ref 8.3–10.6)
CHLORIDE BLD-SCNC: 92 MMOL/L (ref 99–110)
CHLORIDE BLD-SCNC: 93 MMOL/L (ref 99–110)
CLARITY: CLEAR
CO2: 22 MMOL/L (ref 21–32)
CO2: 24 MMOL/L (ref 21–32)
COLOR: ABNORMAL
CREAT SERPL-MCNC: 2.1 MG/DL (ref 0.8–1.3)
CREAT SERPL-MCNC: 2.1 MG/DL (ref 0.8–1.3)
D DIMER: 2525 NG/ML DDU (ref 0–229)
EOSINOPHILS ABSOLUTE: 0 K/UL (ref 0–0.6)
EOSINOPHILS RELATIVE PERCENT: 0 %
EPITHELIAL CELLS, UA: 3 /HPF (ref 0–5)
FIBRINOGEN: 250 MG/DL (ref 200–397)
GFR AFRICAN AMERICAN: 39
GFR AFRICAN AMERICAN: 39
GFR NON-AFRICAN AMERICAN: 32
GFR NON-AFRICAN AMERICAN: 32
GLOBULIN: 5.8 G/DL
GLUCOSE BLD-MCNC: 159 MG/DL (ref 70–99)
GLUCOSE BLD-MCNC: 163 MG/DL (ref 70–99)
GLUCOSE URINE: 100 MG/DL
HCT VFR BLD CALC: 31.7 % (ref 40.5–52.5)
HEMATOLOGY PATH CONSULT: YES
HEMOGLOBIN: 10.7 G/DL (ref 13.5–17.5)
HYALINE CASTS: 7 /LPF (ref 0–8)
INR BLD: 1.5 (ref 0.86–1.14)
KETONES, URINE: ABNORMAL MG/DL
LACTATE DEHYDROGENASE: 355 U/L (ref 100–190)
LACTIC ACID: 2.5 MMOL/L (ref 0.4–2)
LACTIC ACID: 3.6 MMOL/L (ref 0.4–2)
LEUKOCYTE ESTERASE, URINE: ABNORMAL
LIPASE: 12 U/L (ref 13–60)
LYMPHOCYTES ABSOLUTE: 0.5 K/UL (ref 1–5.1)
LYMPHOCYTES RELATIVE PERCENT: 5 %
MACROCYTES: ABNORMAL
MCH RBC QN AUTO: 37.8 PG (ref 26–34)
MCHC RBC AUTO-ENTMCNC: 33.9 G/DL (ref 31–36)
MCV RBC AUTO: 111.4 FL (ref 80–100)
MICROSCOPIC EXAMINATION: YES
MONOCYTES ABSOLUTE: 2.4 K/UL (ref 0–1.3)
MONOCYTES RELATIVE PERCENT: 23 %
NEUTROPHILS ABSOLUTE: 7.6 K/UL (ref 1.7–7.7)
NEUTROPHILS RELATIVE PERCENT: 70 %
NITRITE, URINE: NEGATIVE
PDW BLD-RTO: 16.3 % (ref 12.4–15.4)
PH UA: 5.5 (ref 5–8)
PHOSPHORUS: 2.2 MG/DL (ref 2.5–4.9)
PLATELET # BLD: 68 K/UL (ref 135–450)
PLATELET SLIDE REVIEW: ABNORMAL
PMV BLD AUTO: 9.9 FL (ref 5–10.5)
POLYCHROMASIA: ABNORMAL
POTASSIUM SERPL-SCNC: 3.6 MMOL/L (ref 3.5–5.1)
POTASSIUM SERPL-SCNC: 3.6 MMOL/L (ref 3.5–5.1)
PROCALCITONIN: 0.49 NG/ML (ref 0–0.15)
PROTEIN UA: ABNORMAL MG/DL
PROTHROMBIN TIME: 17.5 SEC (ref 10–13.2)
RBC # BLD: 2.84 M/UL (ref 4.2–5.9)
RBC UA: 6 /HPF (ref 0–4)
SLIDE REVIEW: ABNORMAL
SODIUM BLD-SCNC: 126 MMOL/L (ref 136–145)
SODIUM BLD-SCNC: 126 MMOL/L (ref 136–145)
SPECIFIC GRAVITY UA: 1.01 (ref 1–1.03)
TOTAL PROTEIN: 7.6 G/DL (ref 6.4–8.2)
TRICHOMONAS: PRESENT /HPF
TROPONIN: <0.01 NG/ML
URIC ACID, SERUM: 8.1 MG/DL (ref 3.5–7.2)
URINE REFLEX TO CULTURE: YES
URINE TYPE: ABNORMAL
UROBILINOGEN, URINE: >=8 E.U./DL
WBC # BLD: 10.5 K/UL (ref 4–11)
WBC UA: 16 /HPF (ref 0–5)

## 2020-08-17 PROCEDURE — 84550 ASSAY OF BLOOD/URIC ACID: CPT

## 2020-08-17 PROCEDURE — 71045 X-RAY EXAM CHEST 1 VIEW: CPT

## 2020-08-17 PROCEDURE — 6360000002 HC RX W HCPCS: Performed by: EMERGENCY MEDICINE

## 2020-08-17 PROCEDURE — 82570 ASSAY OF URINE CREATININE: CPT

## 2020-08-17 PROCEDURE — 99284 EMERGENCY DEPT VISIT MOD MDM: CPT

## 2020-08-17 PROCEDURE — 87040 BLOOD CULTURE FOR BACTERIA: CPT

## 2020-08-17 PROCEDURE — 84540 ASSAY OF URINE/UREA-N: CPT

## 2020-08-17 PROCEDURE — 81003 URINALYSIS AUTO W/O SCOPE: CPT

## 2020-08-17 PROCEDURE — 85379 FIBRIN DEGRADATION QUANT: CPT

## 2020-08-17 PROCEDURE — 87086 URINE CULTURE/COLONY COUNT: CPT

## 2020-08-17 PROCEDURE — 85025 COMPLETE CBC W/AUTO DIFF WBC: CPT

## 2020-08-17 PROCEDURE — 85610 PROTHROMBIN TIME: CPT

## 2020-08-17 PROCEDURE — 6360000002 HC RX W HCPCS: Performed by: PHYSICIAN ASSISTANT

## 2020-08-17 PROCEDURE — 84484 ASSAY OF TROPONIN QUANT: CPT

## 2020-08-17 PROCEDURE — 2060000000 HC ICU INTERMEDIATE R&B

## 2020-08-17 PROCEDURE — 82728 ASSAY OF FERRITIN: CPT

## 2020-08-17 PROCEDURE — 84300 ASSAY OF URINE SODIUM: CPT

## 2020-08-17 PROCEDURE — 83615 LACTATE (LD) (LDH) ENZYME: CPT

## 2020-08-17 PROCEDURE — 81001 URINALYSIS AUTO W/SCOPE: CPT

## 2020-08-17 PROCEDURE — 86850 RBC ANTIBODY SCREEN: CPT

## 2020-08-17 PROCEDURE — 85384 FIBRINOGEN ACTIVITY: CPT

## 2020-08-17 PROCEDURE — 83690 ASSAY OF LIPASE: CPT

## 2020-08-17 PROCEDURE — 86900 BLOOD TYPING SEROLOGIC ABO: CPT

## 2020-08-17 PROCEDURE — 96367 TX/PROPH/DG ADDL SEQ IV INF: CPT

## 2020-08-17 PROCEDURE — 86901 BLOOD TYPING SEROLOGIC RH(D): CPT

## 2020-08-17 PROCEDURE — P9047 ALBUMIN (HUMAN), 25%, 50ML: HCPCS | Performed by: EMERGENCY MEDICINE

## 2020-08-17 PROCEDURE — 80053 COMPREHEN METABOLIC PANEL: CPT

## 2020-08-17 PROCEDURE — 96375 TX/PRO/DX INJ NEW DRUG ADDON: CPT

## 2020-08-17 PROCEDURE — U0003 INFECTIOUS AGENT DETECTION BY NUCLEIC ACID (DNA OR RNA); SEVERE ACUTE RESPIRATORY SYNDROME CORONAVIRUS 2 (SARS-COV-2) (CORONAVIRUS DISEASE [COVID-19]), AMPLIFIED PROBE TECHNIQUE, MAKING USE OF HIGH THROUGHPUT TECHNOLOGIES AS DESCRIBED BY CMS-2020-01-R: HCPCS

## 2020-08-17 PROCEDURE — 83605 ASSAY OF LACTIC ACID: CPT

## 2020-08-17 PROCEDURE — 93005 ELECTROCARDIOGRAM TRACING: CPT | Performed by: EMERGENCY MEDICINE

## 2020-08-17 PROCEDURE — 96366 THER/PROPH/DIAG IV INF ADDON: CPT

## 2020-08-17 PROCEDURE — 84133 ASSAY OF URINE POTASSIUM: CPT

## 2020-08-17 PROCEDURE — 96365 THER/PROPH/DIAG IV INF INIT: CPT

## 2020-08-17 PROCEDURE — 82436 ASSAY OF URINE CHLORIDE: CPT

## 2020-08-17 PROCEDURE — 2580000003 HC RX 258: Performed by: PHYSICIAN ASSISTANT

## 2020-08-17 PROCEDURE — 84145 PROCALCITONIN (PCT): CPT

## 2020-08-17 PROCEDURE — 85730 THROMBOPLASTIN TIME PARTIAL: CPT

## 2020-08-17 RX ORDER — FENTANYL CITRATE 50 UG/ML
50 INJECTION, SOLUTION INTRAMUSCULAR; INTRAVENOUS ONCE
Status: COMPLETED | OUTPATIENT
Start: 2020-08-17 | End: 2020-08-17

## 2020-08-17 RX ORDER — 0.9 % SODIUM CHLORIDE 0.9 %
500 INTRAVENOUS SOLUTION INTRAVENOUS ONCE
Status: COMPLETED | OUTPATIENT
Start: 2020-08-17 | End: 2020-08-17

## 2020-08-17 RX ORDER — ALBUMIN, HUMAN INJ 5% 5 %
25 SOLUTION INTRAVENOUS EVERY 6 HOURS
Status: DISCONTINUED | OUTPATIENT
Start: 2020-08-18 | End: 2020-08-18

## 2020-08-17 RX ORDER — ALBUMIN (HUMAN) 12.5 G/50ML
25 SOLUTION INTRAVENOUS ONCE
Status: COMPLETED | OUTPATIENT
Start: 2020-08-17 | End: 2020-08-17

## 2020-08-17 RX ADMIN — CEFEPIME HYDROCHLORIDE 2 G: 2 INJECTION, POWDER, FOR SOLUTION INTRAVENOUS at 21:09

## 2020-08-17 RX ADMIN — ALBUMIN (HUMAN) 25 G: 0.25 INJECTION, SOLUTION INTRAVENOUS at 22:45

## 2020-08-17 RX ADMIN — VANCOMYCIN HYDROCHLORIDE 1500 MG: 10 INJECTION, POWDER, LYOPHILIZED, FOR SOLUTION INTRAVENOUS at 21:49

## 2020-08-17 RX ADMIN — SODIUM CHLORIDE 500 ML: 9 INJECTION, SOLUTION INTRAVENOUS at 19:30

## 2020-08-17 RX ADMIN — FENTANYL CITRATE 50 MCG: 50 INJECTION, SOLUTION INTRAMUSCULAR; INTRAVENOUS at 18:46

## 2020-08-17 ASSESSMENT — PAIN SCALES - GENERAL
PAINLEVEL_OUTOF10: 10
PAINLEVEL_OUTOF10: 7

## 2020-08-17 ASSESSMENT — PAIN DESCRIPTION - ORIENTATION: ORIENTATION: LEFT;RIGHT

## 2020-08-17 ASSESSMENT — PAIN DESCRIPTION - LOCATION: LOCATION: LEG

## 2020-08-17 NOTE — ED PROVIDER NOTES
cough, abdominal pain, dysuria, hematuria, diarrhea, bloody stool or any other symptoms. Nursing Notes were all reviewed and agreed with or any disagreements were addressed in the HPI. REVIEW OF SYSTEMS    (2-9 systems for level 4, 10 or more for level 5)     Review of Systems    Positives and Pertinent negatives as per HPI. Except as noted above in the ROS, all other systems were reviewed and negative. PAST MEDICAL HISTORY     Past Medical History:   Diagnosis Date    Cirrhosis of liver ([facility] [address]     aug 2018         SURGICAL HISTORY   History reviewed. No pertinent surgical history. CURRENTMEDICATIONS       Previous Medications    FUROSEMIDE (LASIX) 80 MG TABLET    Take 1 tablet by mouth daily    POTASSIUM CHLORIDE (KLOR-CON M) 20 MEQ EXTENDED RELEASE TABLET    Take 1 tablet by mouth daily    SPIRONOLACTONE (ALDACTONE) 100 MG TABLET    Take 1 tablet by mouth daily         ALLERGIES     Patient has no known allergies. FAMILYHISTORY       Family History   Problem Relation Age of Onset    High Blood Pressure Mother           SOCIAL HISTORY       Social History     Tobacco Use    Smoking status: Current Every Day Smoker     Packs/day: 0.25    Smokeless tobacco: Never Used   Substance Use Topics    Alcohol use: Not Currently     Frequency: Never    Drug use: Never       SCREENINGS             PHYSICAL EXAM    (up to 7 for level 4, 8 or more for level 5)     ED Triage Vitals [08/17/20 1652]   BP Temp Temp Source Pulse Resp SpO2 Height Weight   95/61 98.1 °F (36.7 °C) Oral 69 20 98 % -- 236 lb (107 kg)       Physical Exam  Vitals signs and nursing note reviewed. Constitutional:       Appearance: He is well-developed. He is not diaphoretic. Comments: Chronically ill-appearing   HENT:      Head: Atraumatic. Nose: Nose normal.   Eyes:      General:         Right eye: No discharge. Left eye: No discharge. Neck:      Musculoskeletal: Normal range of motion.    Cardiovascular: Rate and Rhythm: Normal rate and regular rhythm. Heart sounds: No murmur. No friction rub. No gallop. Pulmonary:      Effort: Pulmonary effort is normal. No respiratory distress. Breath sounds: No stridor. No wheezing, rhonchi or rales. Comments: Some decreased breath sounds at the bases body obvious retractions, rales or wheezing. Abdominal:      General: Bowel sounds are normal. There is no distension. Palpations: Abdomen is soft. There is no mass. Tenderness: There is no abdominal tenderness. There is no guarding or rebound. Hernia: A hernia is present. Comments: Large reducible periumbilical hernia with some generalized abdominal swelling. No tenderness. Musculoskeletal: Normal range of motion. General: No swelling. Right lower leg: Edema present. Left lower leg: Edema present. Skin:     General: Skin is warm and dry. Findings: No erythema or rash. Neurological:      Mental Status: He is alert and oriented to person, place, and time. Cranial Nerves: No cranial nerve deficit.    Psychiatric:         Behavior: Behavior normal.         DIAGNOSTIC RESULTS   LABS:    Labs Reviewed   CBC WITH AUTO DIFFERENTIAL - Abnormal; Notable for the following components:       Result Value    RBC 2.84 (*)     Hemoglobin 10.7 (*)     Hematocrit 31.7 (*)     .4 (*)     MCH 37.8 (*)     RDW 16.3 (*)     Platelets 68 (*)     Lymphocytes Absolute 0.5 (*)     Monocytes Absolute 2.4 (*)     Macrocytes 2+ (*)     Polychromasia Occasional (*)     All other components within normal limits    Narrative:     Performed at:  [facility] [address], [place], [facility] [address]   Phone [phone]   COMPREHENSIVE METABOLIC PANEL - Abnormal; Notable for the following components:    Sodium 126 (*)     Chloride 93 (*)     Glucose 163 (*)     BUN 25 (*)     CREATININE 2.1 (*)     GFR Non- 32 (*)     GFR  39 (*)     Calcium 7.8 (*)     Alb 1.8 (*)     Albumin/Globulin Ratio 0.3 (*)     Total Bilirubin 10.0 (*)     AST 58 (*)     All other components within normal limits    Narrative:     Performed at:  [facility] [address], [email or web address]   Phone [phone]   LIPASE - Abnormal; Notable for the following components:    Lipase 12.0 (*)     All other components within normal limits    Narrative:     Performed at:  [facility] [address], [email or web address]   Phone [phone]   PROTIME-INR - Abnormal; Notable for the following components:    Protime 17.5 (*)     INR 1.50 (*)     All other components within normal limits    Narrative:     Performed at:  [facility] [address], [email or web address]   Phone [phone]   LACTATE DEHYDROGENASE - Abnormal; Notable for the following components:     (*)     All other components within normal limits    Narrative:     Performed at:  [facility] [address] [email or web address]   Phone [phone]   D-DIMER, QUANTITATIVE - Abnormal; Notable for the following components:    D-Dimer, Quant 2525 (*)     All other components within normal limits    Narrative:     Performed at:  [facility] [address], [email or web address]   Phone [phone]   PROCALCITONIN - Abnormal; Notable for the following components:    Procalcitonin 0.49 (*)     All other components within normal limits    Narrative:     Performed at:  [facility] [address], [email or web address]   Phone [phone]   LACTIC ACID, PLASMA - Abnormal; Notable for the following components:    Lactic Acid 3.6 (*)     All other components within normal limits    Narrative:     Performed at:  [facility] [address], [email or web address] Phone [phone]   LACTIC ACID, PLASMA - Abnormal; Notable for the following components:    Lactic Acid 2.5 (*)     All other components within normal limits    Narrative:     Performed at:  [facility] [address] [facility]  [place], [address]   Phone [phone]   CULTURE, BLOOD 1   CULTURE, BLOOD 2   APTT    Narrative:     Performed at:  [facility] [address], [place], [address]   Phone [phone]   FIBRINOGEN    Narrative:     Performed at:  [facility] [address], [address]   Phone [phone]   TROPONIN    Narrative:     Performed at:  [facility] [address], [place], [address]   Phone [phone]   URINE RT REFLEX TO CULTURE   FERRITIN   COVID-19   PROTIME-INR   APTT   FIBRINOGEN   D-DIMER, QUANTITATIVE   RENAL FUNCTION PANEL   URINALYSIS WITH MICROSCOPIC   UREA NITROGEN, URINE   ELECTROLYTES URINE RANDOM   URIC ACID   CREATININE, RANDOM URINE   RENAL FUNCTION PANEL   TYPE AND SCREEN    Narrative:     Performed at:  [facility] [address] [facility], [address]   Phone [phone]   ANTIBODY SCREEN    Narrative:     Performed at:  [facility] [address], [facility] [address]   Phone [phone]       All other labs were within normal range or not returned as of this dictation. EKG: All EKG's are interpreted by the Emergency Department Physician in the absence of a cardiologist.  Please see their note for interpretation of EKG.       RADIOLOGY:   Non-plain film images such as CT, Ultrasound and MRI are read by the radiologist. Plain radiographic images are visualized and preliminarily interpreted by the ED Provider with the below findings:        Interpretation per the Radiologist below, if available at the time of this note:    XR CHEST PORTABLE   Final Result   Increasing pleuroparenchymal disease on the left. Ir Us Guided Paracentesis    Result Date: 8/13/2020  PROCEDURE: ULTRASOUND GUIDED PARACENTESIS 8/13/2020 HISTORY: ORDERING SYSTEM PROVIDED HISTORY: Ascites due to alcoholic cirrhosis ([address] TECHNIQUE: Informed consent was obtained after a detailed explanation of the procedure including risks, benefits, and alternatives. Universal protocol was followed. The right abdomen was prepped and draped in sterile fashion and local anesthesia was achieved with lidocaine. A 5 Western Kimberly Yueh needle sheath was advanced under ultrasound guidance into ascites and paracentesis was performed. The patient tolerated the procedure well. FINDINGS: A total of 5.5 L of yellow ascites was removed. Successful ultrasound guided paracentesis. PROCEDURES   Unless otherwise noted below, none     Procedures    CRITICAL CARE TIME   N/A    CONSULTS:   1.Dr. Anne Oh  2.  Dr. Leonor Cortez and DIFFERENTIAL DIAGNOSIS/MDM:   Vitals:    Vitals:    08/17/20 1945 08/17/20 2000 08/17/20 2015 08/17/20 2030   BP:    90/65   Pulse: 69 70 70 69   Resp: 19 19 20 19   Temp:       TempSrc:       SpO2: 97% 97% 97% 96%   Weight:           Patient was given the following medications:  Medications   vancomycin (VANCOCIN) 1,500 mg in dextrose 5 % 250 mL IVPB (has no administration in time range)   albumin human 5 % IV solution 25 g (has no administration in time range)   albumin human 25 % IV solution 25 g (has no administration in time range)   fentaNYL (SUBLIMAZE) injection 50 mcg (50 mcg Intravenous Given 8/17/20 1846)   0.9 % sodium chloride bolus (500 mLs Intravenous New Bag 8/17/20 1930)   cefepime (MAXIPIME) 2 g IVPB minibag (2 g Intravenous New Bag 8/17/20 2109)           SEP-1 CORE MEASURE DATA    Classification: exclude from core measure    Exclusion criteria: the patient is NOT to be included for sepsis due to:  Viral etiology found or highly suspected (including possible COVID-19) without concomitant bacterial infection     Patient presented with general weakness, loss of taste, decreased appetite and body aches. He does have history of liver cirrhosis related he gets weekly paracentesis. He was found to have lymphopenia, hyponatremia and lactic acidosis. D-dimer is elevated but given platelet count we will hold off on anticoagulation at this time will defer to hospitalist for this. He does have KACEY with a creatinine of 2.1. Given his history of cirrhosis only gentle fluid hydration was initially initiated that 500 cc. Further fluid hydration was performed with albumin and per nephrology recommendation. Patient does have worsening consolidation in the left lower lung. Repeat lactic is still pending but suspect could be COVID-19. To this do not want to give 30 mL's per kilogram of fluids also due to his history of cirrhosis. Low suspicion for pulmonary embolus, pneumothorax, acute abdomen or other emergent etiology. Patient was stable time of admission. FINAL IMPRESSION      1. Severe sepsis ([address]    2. Pneumonia due to organism    3. Acute kidney injury [facility])          DISPOSITION/PLAN   DISPOSITION Decision To Admit 08/17/2020 07:49:51 PM      PATIENT REFERREDTO:  No follow-up provider specified.     DISCHARGE MEDICATIONS:  New Prescriptions    No medications on file       DISCONTINUED MEDICATIONS:  Discontinued Medications    No medications on file              (Please note that portions of this note were completed with a voice recognition program.  Efforts were made to edit the dictations but occasionally words are mis-transcribed.)    Janny العراقي PA-C (electronically signed)           Janny العراقي PA-C  08/17/20 6471

## 2020-08-17 NOTE — ED NOTES
PT given warm blanket.  Bed alarm activated     Renee Robbins RN  08/17/20 [address] [facility], RN  08/17/20 6308

## 2020-08-18 PROBLEM — E87.1 HYPONATREMIA: Status: ACTIVE | Noted: 2020-08-18

## 2020-08-18 PROBLEM — D68.9 COAGULOPATHY (HCC): Status: ACTIVE | Noted: 2020-08-18

## 2020-08-18 PROBLEM — K70.30 ALCOHOLIC CIRRHOSIS (HCC): Status: ACTIVE | Noted: 2020-08-18

## 2020-08-18 PROBLEM — D69.6 THROMBOCYTOPENIA (HCC): Status: ACTIVE | Noted: 2020-08-18

## 2020-08-18 PROBLEM — D64.9 ANEMIA: Status: ACTIVE | Noted: 2020-08-18

## 2020-08-18 LAB
A/G RATIO: 0.4 (ref 1.1–2.2)
A/G RATIO: 0.6 (ref 1.1–2.2)
ALBUMIN SERPL-MCNC: 2 G/DL (ref 3.4–5)
ALBUMIN SERPL-MCNC: 2.4 G/DL (ref 3.4–5)
ALP BLD-CCNC: 78 U/L (ref 40–129)
ALP BLD-CCNC: 88 U/L (ref 40–129)
ALT SERPL-CCNC: 18 U/L (ref 10–40)
ALT SERPL-CCNC: 20 U/L (ref 10–40)
ANION GAP SERPL CALCULATED.3IONS-SCNC: 11 MMOL/L (ref 3–16)
ANION GAP SERPL CALCULATED.3IONS-SCNC: 7 MMOL/L (ref 3–16)
APTT: 44.3 SEC (ref 24.2–36.2)
APTT: 47.4 SEC (ref 24.2–36.2)
AST SERPL-CCNC: 41 U/L (ref 15–37)
AST SERPL-CCNC: 42 U/L (ref 15–37)
BASOPHILS ABSOLUTE: 0 K/UL (ref 0–0.2)
BASOPHILS ABSOLUTE: 0 K/UL (ref 0–0.2)
BASOPHILS RELATIVE PERCENT: 0.3 %
BASOPHILS RELATIVE PERCENT: 0.6 %
BILIRUB SERPL-MCNC: 10 MG/DL (ref 0–1)
BILIRUB SERPL-MCNC: 10.5 MG/DL (ref 0–1)
BILIRUB SERPL-MCNC: 9.8 MG/DL (ref 0–1)
BILIRUBIN DIRECT: 5.6 MG/DL (ref 0–0.3)
BILIRUBIN, INDIRECT: 4.9 MG/DL (ref 0–1)
BUN BLDV-MCNC: 24 MG/DL (ref 7–20)
BUN BLDV-MCNC: 25 MG/DL (ref 7–20)
CALCIUM SERPL-MCNC: 7.5 MG/DL (ref 8.3–10.6)
CALCIUM SERPL-MCNC: 7.7 MG/DL (ref 8.3–10.6)
CHLORIDE BLD-SCNC: 94 MMOL/L (ref 99–110)
CHLORIDE BLD-SCNC: 95 MMOL/L (ref 99–110)
CHLORIDE URINE RANDOM: <20 MMOL/L
CO2: 23 MMOL/L (ref 21–32)
CO2: 26 MMOL/L (ref 21–32)
CREAT SERPL-MCNC: 1.8 MG/DL (ref 0.8–1.3)
CREAT SERPL-MCNC: 1.9 MG/DL (ref 0.8–1.3)
CREATININE URINE: 315.6 MG/DL (ref 39–259)
D DIMER: 1878 NG/ML DDU (ref 0–229)
D DIMER: 1903 NG/ML DDU (ref 0–229)
EKG ATRIAL RATE: 69 BPM
EKG DIAGNOSIS: NORMAL
EKG P AXIS: 9 DEGREES
EKG P-R INTERVAL: 138 MS
EKG Q-T INTERVAL: 410 MS
EKG QRS DURATION: 82 MS
EKG QTC CALCULATION (BAZETT): 439 MS
EKG R AXIS: -20 DEGREES
EKG T AXIS: -12 DEGREES
EKG VENTRICULAR RATE: 69 BPM
EOSINOPHILS ABSOLUTE: 0.1 K/UL (ref 0–0.6)
EOSINOPHILS ABSOLUTE: 0.1 K/UL (ref 0–0.6)
EOSINOPHILS RELATIVE PERCENT: 0.9 %
EOSINOPHILS RELATIVE PERCENT: 1.4 %
FERRITIN: 1073 NG/ML (ref 30–400)
FERRITIN: 864.6 NG/ML (ref 30–400)
FIBRINOGEN: 175 MG/DL (ref 200–397)
FIBRINOGEN: 200 MG/DL (ref 200–397)
GFR AFRICAN AMERICAN: 43
GFR AFRICAN AMERICAN: 46
GFR NON-AFRICAN AMERICAN: 36
GFR NON-AFRICAN AMERICAN: 38
GLOBULIN: 4.2 G/DL
GLOBULIN: 4.7 G/DL
GLUCOSE BLD-MCNC: 138 MG/DL (ref 70–99)
GLUCOSE BLD-MCNC: 143 MG/DL (ref 70–99)
HCT VFR BLD CALC: 29.1 % (ref 40.5–52.5)
HCT VFR BLD CALC: 29.7 % (ref 40.5–52.5)
HEMATOLOGY PATH CONSULT: NORMAL
HEMOGLOBIN: 10 G/DL (ref 13.5–17.5)
HEMOGLOBIN: 10.1 G/DL (ref 13.5–17.5)
INR BLD: 1.69 (ref 0.86–1.14)
INR BLD: 1.75 (ref 0.86–1.14)
LACTATE DEHYDROGENASE: 322 U/L (ref 100–190)
LACTIC ACID: 2.5 MMOL/L (ref 0.4–2)
LYMPHOCYTES ABSOLUTE: 0.3 K/UL (ref 1–5.1)
LYMPHOCYTES ABSOLUTE: 1.1 K/UL (ref 1–5.1)
LYMPHOCYTES RELATIVE PERCENT: 12.1 %
LYMPHOCYTES RELATIVE PERCENT: 4.5 %
MAGNESIUM: 1.9 MG/DL (ref 1.8–2.4)
MCH RBC QN AUTO: 37.5 PG (ref 26–34)
MCH RBC QN AUTO: 38.1 PG (ref 26–34)
MCHC RBC AUTO-ENTMCNC: 34 G/DL (ref 31–36)
MCHC RBC AUTO-ENTMCNC: 34.6 G/DL (ref 31–36)
MCV RBC AUTO: 110.1 FL (ref 80–100)
MCV RBC AUTO: 110.3 FL (ref 80–100)
MONOCYTES ABSOLUTE: 1 K/UL (ref 0–1.3)
MONOCYTES ABSOLUTE: 2 K/UL (ref 0–1.3)
MONOCYTES RELATIVE PERCENT: 12.6 %
MONOCYTES RELATIVE PERCENT: 21.4 %
NEUTROPHILS ABSOLUTE: 5.9 K/UL (ref 1.7–7.7)
NEUTROPHILS ABSOLUTE: 6.2 K/UL (ref 1.7–7.7)
NEUTROPHILS RELATIVE PERCENT: 64.8 %
NEUTROPHILS RELATIVE PERCENT: 81.4 %
PDW BLD-RTO: 16.1 % (ref 12.4–15.4)
PDW BLD-RTO: 16.3 % (ref 12.4–15.4)
PLATELET # BLD: 55 K/UL (ref 135–450)
PLATELET # BLD: 60 K/UL (ref 135–450)
PMV BLD AUTO: 9.4 FL (ref 5–10.5)
PMV BLD AUTO: 9.9 FL (ref 5–10.5)
POTASSIUM REFLEX MAGNESIUM: 3.5 MMOL/L (ref 3.5–5.1)
POTASSIUM REFLEX MAGNESIUM: 3.8 MMOL/L (ref 3.5–5.1)
POTASSIUM, UR: 35.6 MMOL/L
PROCALCITONIN: 0.39 NG/ML (ref 0–0.15)
PROTHROMBIN TIME: 19.7 SEC (ref 10–13.2)
PROTHROMBIN TIME: 20.4 SEC (ref 10–13.2)
RBC # BLD: 2.64 M/UL (ref 4.2–5.9)
RBC # BLD: 2.7 M/UL (ref 4.2–5.9)
SARS-COV-2, NAA: NOT DETECTED
SODIUM BLD-SCNC: 128 MMOL/L (ref 136–145)
SODIUM BLD-SCNC: 128 MMOL/L (ref 136–145)
SODIUM URINE: <20 MMOL/L
TOTAL PROTEIN: 6.6 G/DL (ref 6.4–8.2)
TOTAL PROTEIN: 6.7 G/DL (ref 6.4–8.2)
UREA NITROGEN, UR: 597.2 MG/DL (ref 800–1666)
URINE CULTURE, ROUTINE: NORMAL
WBC # BLD: 7.6 K/UL (ref 4–11)
WBC # BLD: 9.1 K/UL (ref 4–11)

## 2020-08-18 PROCEDURE — 82247 BILIRUBIN TOTAL: CPT

## 2020-08-18 PROCEDURE — 92611 MOTION FLUOROSCOPY/SWALLOW: CPT

## 2020-08-18 PROCEDURE — 92526 ORAL FUNCTION THERAPY: CPT

## 2020-08-18 PROCEDURE — 6360000002 HC RX W HCPCS: Performed by: INTERNAL MEDICINE

## 2020-08-18 PROCEDURE — 80053 COMPREHEN METABOLIC PANEL: CPT

## 2020-08-18 PROCEDURE — 93010 ELECTROCARDIOGRAM REPORT: CPT | Performed by: INTERNAL MEDICINE

## 2020-08-18 PROCEDURE — 85610 PROTHROMBIN TIME: CPT

## 2020-08-18 PROCEDURE — 94761 N-INVAS EAR/PLS OXIMETRY MLT: CPT

## 2020-08-18 PROCEDURE — 2060000000 HC ICU INTERMEDIATE R&B

## 2020-08-18 PROCEDURE — 85384 FIBRINOGEN ACTIVITY: CPT

## 2020-08-18 PROCEDURE — 6360000002 HC RX W HCPCS: Performed by: HOSPITALIST

## 2020-08-18 PROCEDURE — P9045 ALBUMIN (HUMAN), 5%, 250 ML: HCPCS | Performed by: HOSPITALIST

## 2020-08-18 PROCEDURE — 83615 LACTATE (LD) (LDH) ENZYME: CPT

## 2020-08-18 PROCEDURE — 83735 ASSAY OF MAGNESIUM: CPT

## 2020-08-18 PROCEDURE — 2580000003 HC RX 258: Performed by: HOSPITALIST

## 2020-08-18 PROCEDURE — 84145 PROCALCITONIN (PCT): CPT

## 2020-08-18 PROCEDURE — 94150 VITAL CAPACITY TEST: CPT

## 2020-08-18 PROCEDURE — 82248 BILIRUBIN DIRECT: CPT

## 2020-08-18 PROCEDURE — 83605 ASSAY OF LACTIC ACID: CPT

## 2020-08-18 PROCEDURE — 85730 THROMBOPLASTIN TIME PARTIAL: CPT

## 2020-08-18 PROCEDURE — 94640 AIRWAY INHALATION TREATMENT: CPT

## 2020-08-18 PROCEDURE — P9045 ALBUMIN (HUMAN), 5%, 250 ML: HCPCS | Performed by: INTERNAL MEDICINE

## 2020-08-18 PROCEDURE — 85025 COMPLETE CBC W/AUTO DIFF WBC: CPT

## 2020-08-18 PROCEDURE — 6370000000 HC RX 637 (ALT 250 FOR IP): Performed by: HOSPITALIST

## 2020-08-18 PROCEDURE — 36415 COLL VENOUS BLD VENIPUNCTURE: CPT

## 2020-08-18 PROCEDURE — 82728 ASSAY OF FERRITIN: CPT

## 2020-08-18 PROCEDURE — 85379 FIBRIN DEGRADATION QUANT: CPT

## 2020-08-18 RX ORDER — ACETAMINOPHEN 650 MG/1
650 SUPPOSITORY RECTAL EVERY 6 HOURS PRN
Status: DISCONTINUED | OUTPATIENT
Start: 2020-08-18 | End: 2020-08-29 | Stop reason: HOSPADM

## 2020-08-18 RX ORDER — DEXAMETHASONE 4 MG/1
6 TABLET ORAL DAILY
Status: DISCONTINUED | OUTPATIENT
Start: 2020-08-18 | End: 2020-08-18

## 2020-08-18 RX ORDER — ONDANSETRON 4 MG/1
4 TABLET, ORALLY DISINTEGRATING ORAL EVERY 8 HOURS PRN
Status: DISCONTINUED | OUTPATIENT
Start: 2020-08-18 | End: 2020-08-29 | Stop reason: HOSPADM

## 2020-08-18 RX ORDER — SENNA PLUS 8.6 MG/1
1 TABLET ORAL DAILY PRN
Status: DISCONTINUED | OUTPATIENT
Start: 2020-08-18 | End: 2020-08-29 | Stop reason: HOSPADM

## 2020-08-18 RX ORDER — SODIUM CHLORIDE 0.9 % (FLUSH) 0.9 %
10 SYRINGE (ML) INJECTION PRN
Status: DISCONTINUED | OUTPATIENT
Start: 2020-08-18 | End: 2020-08-29 | Stop reason: HOSPADM

## 2020-08-18 RX ORDER — NICOTINE 21 MG/24HR
1 PATCH, TRANSDERMAL 24 HOURS TRANSDERMAL DAILY
Status: DISCONTINUED | OUTPATIENT
Start: 2020-08-18 | End: 2020-08-19

## 2020-08-18 RX ORDER — ALBUMIN, HUMAN INJ 5% 5 %
25 SOLUTION INTRAVENOUS EVERY 6 HOURS
Status: COMPLETED | OUTPATIENT
Start: 2020-08-18 | End: 2020-08-19

## 2020-08-18 RX ORDER — ACETAMINOPHEN 325 MG/1
650 TABLET ORAL EVERY 6 HOURS PRN
Status: DISCONTINUED | OUTPATIENT
Start: 2020-08-18 | End: 2020-08-29 | Stop reason: HOSPADM

## 2020-08-18 RX ORDER — ALBUTEROL SULFATE 90 UG/1
2 AEROSOL, METERED RESPIRATORY (INHALATION) 4 TIMES DAILY
Status: DISCONTINUED | OUTPATIENT
Start: 2020-08-18 | End: 2020-08-19

## 2020-08-18 RX ORDER — FUROSEMIDE 10 MG/ML
40 INJECTION INTRAMUSCULAR; INTRAVENOUS 2 TIMES DAILY
Status: DISCONTINUED | OUTPATIENT
Start: 2020-08-18 | End: 2020-08-21

## 2020-08-18 RX ORDER — ONDANSETRON 2 MG/ML
4 INJECTION INTRAMUSCULAR; INTRAVENOUS EVERY 6 HOURS PRN
Status: DISCONTINUED | OUTPATIENT
Start: 2020-08-18 | End: 2020-08-29 | Stop reason: HOSPADM

## 2020-08-18 RX ORDER — SODIUM CHLORIDE 0.9 % (FLUSH) 0.9 %
10 SYRINGE (ML) INJECTION EVERY 12 HOURS SCHEDULED
Status: DISCONTINUED | OUTPATIENT
Start: 2020-08-18 | End: 2020-08-29 | Stop reason: HOSPADM

## 2020-08-18 RX ORDER — MORPHINE SULFATE 2 MG/ML
2 INJECTION, SOLUTION INTRAMUSCULAR; INTRAVENOUS EVERY 4 HOURS PRN
Status: DISCONTINUED | OUTPATIENT
Start: 2020-08-18 | End: 2020-08-19

## 2020-08-18 RX ADMIN — FUROSEMIDE 40 MG: 10 INJECTION, SOLUTION INTRAMUSCULAR; INTRAVENOUS at 18:30

## 2020-08-18 RX ADMIN — CEFEPIME HYDROCHLORIDE 2 G: 2 INJECTION, POWDER, FOR SOLUTION INTRAVENOUS at 09:02

## 2020-08-18 RX ADMIN — Medication 2 PUFF: at 11:58

## 2020-08-18 RX ADMIN — ALBUMIN (HUMAN) 25 G: 12.5 INJECTION, SOLUTION INTRAVENOUS at 18:40

## 2020-08-18 RX ADMIN — MORPHINE SULFATE 2 MG: 2 INJECTION, SOLUTION INTRAMUSCULAR; INTRAVENOUS at 18:28

## 2020-08-18 RX ADMIN — Medication 10 ML: at 20:28

## 2020-08-18 RX ADMIN — ACETAMINOPHEN 650 MG: 325 TABLET, FILM COATED ORAL at 10:54

## 2020-08-18 RX ADMIN — VANCOMYCIN HYDROCHLORIDE 1500 MG: 10 INJECTION, POWDER, LYOPHILIZED, FOR SOLUTION INTRAVENOUS at 21:33

## 2020-08-18 RX ADMIN — ALBUMIN (HUMAN) 25 G: 12.5 INJECTION, SOLUTION INTRAVENOUS at 01:52

## 2020-08-18 RX ADMIN — CEFEPIME HYDROCHLORIDE 2 G: 2 INJECTION, POWDER, FOR SOLUTION INTRAVENOUS at 20:27

## 2020-08-18 RX ADMIN — MORPHINE SULFATE 2 MG: 2 INJECTION, SOLUTION INTRAMUSCULAR; INTRAVENOUS at 13:39

## 2020-08-18 RX ADMIN — Medication 2 PUFF: at 15:25

## 2020-08-18 RX ADMIN — DEXAMETHASONE 6 MG: 4 TABLET ORAL at 01:40

## 2020-08-18 RX ADMIN — ALBUMIN (HUMAN) 25 G: 12.5 INJECTION, SOLUTION INTRAVENOUS at 06:56

## 2020-08-18 RX ADMIN — Medication 2 PUFF: at 08:43

## 2020-08-18 RX ADMIN — Medication 10 ML: at 01:40

## 2020-08-18 RX ADMIN — MORPHINE SULFATE 2 MG: 2 INJECTION, SOLUTION INTRAMUSCULAR; INTRAVENOUS at 22:57

## 2020-08-18 RX ADMIN — ACETAMINOPHEN 650 MG: 325 TABLET, FILM COATED ORAL at 01:40

## 2020-08-18 RX ADMIN — ALBUMIN (HUMAN) 25 G: 12.5 INJECTION, SOLUTION INTRAVENOUS at 12:32

## 2020-08-18 RX ADMIN — Medication 10 ML: at 22:57

## 2020-08-18 ASSESSMENT — PAIN DESCRIPTION - PAIN TYPE
TYPE: ACUTE PAIN;CHRONIC PAIN
TYPE: ACUTE PAIN

## 2020-08-18 ASSESSMENT — PAIN DESCRIPTION - FREQUENCY
FREQUENCY: CONTINUOUS
FREQUENCY: CONTINUOUS

## 2020-08-18 ASSESSMENT — PAIN DESCRIPTION - ONSET
ONSET: ON-GOING
ONSET: ON-GOING

## 2020-08-18 ASSESSMENT — PAIN SCALES - GENERAL
PAINLEVEL_OUTOF10: 10

## 2020-08-18 ASSESSMENT — PAIN DESCRIPTION - LOCATION
LOCATION: ABDOMEN;BACK
LOCATION: BACK;LEG
LOCATION: ABDOMEN;BACK
LOCATION: ABDOMEN;BACK

## 2020-08-18 ASSESSMENT — PAIN DESCRIPTION - PROGRESSION
CLINICAL_PROGRESSION: NOT CHANGED
CLINICAL_PROGRESSION: NOT CHANGED

## 2020-08-18 ASSESSMENT — PAIN DESCRIPTION - DESCRIPTORS
DESCRIPTORS: ACHING;BURNING;STABBING

## 2020-08-18 NOTE — PROGRESS NOTES
Clinical Pharmacy Note: Pharmacy to Dose Vancomycin    Rimma Cope is a 59 y.o. male started on Vancomycin; consult received from Dr. Augusto Hernandez to manage therapy. Also receiving the following antibiotics: cefepime. Goal Trough Level: 15-20 mcg/mL    Assessment/Plan:  Initiate vancomycin 1500 mg IV every 24 hours. A vancomycin trough has been ordered for [email or web address]. Changes in regimen will be determined based on culture results, renal function, and clinical response. Pharmacy will continue to monitor and adjust regimen as necessary. Allergies:  Patient has no known allergies. Recent Labs     08/17/20  1712   CREATININE 2.1*  2.1*       Recent Labs     08/17/20  1712   WBC 10.5       Ht Readings from Last 1 Encounters:   08/18/20 6' 2\" (1.88 m)        Wt Readings from Last 1 Encounters:   08/17/20 236 lb (107 kg)         Estimated Creatinine Clearance: 46 mL/min (A) (based on SCr of 2.1 mg/dL (H)).       Thank you for the consult,    Christ PeñaD

## 2020-08-18 NOTE — CONSULTS
Management per primary and other consulting teams. Hospital Problems           Last Modified POA    * (Principal) Sepsis ([facility] [address] 8/17/2020 Yes    Cirrhosis ([address] 8/17/2020 Yes    Anasarca 8/18/2020 Yes    HCAP (healthcare-associated pneumonia) 8/17/2020 Yes    KACEY (acute kidney injury) ([address] 8/17/2020 Yes    Anemia 8/18/2020 Yes    Thrombocytopenia ([address] 8/18/2020 Yes    Coagulopathy ([address] 9/41/1095 Yes    Alcoholic cirrhosis ([address] 2/72/7195 Yes    Hyponatremia 8/18/2020 Yes          Please refer to the orders. High Complexity. Multiple complex problems. Discussed with patient, and treatment team- referring ER team- Vijay Gomez PA-C   + Romina Waters MD  Thank you for allowing me to participate in this patient's care. Please do not hesitate to contact me with any questions/concerns. We will follow along with you. Lucia Colindres MD       [facility] [address] [place] [address] [place]: [phone] or [employer]  Fax: [phone]        ========================================================   ========================================================       CHIEF COMPLAINT:   Chief Complaint   Patient presents with    Leg Swelling     Pt c/o pain to bilateral lower extremities with gross amount of swelling. Denies fever. reports decreased appetite. Hx liver failure. reports not taking prescribed medications because they havent been filled by the pharmacy. Gross abdominal swelling noted as well     History Obtained From:  patient + treatment team + Electronic Medical Records    HPI: Mr. Brandyn Carson is a 59 y.o. male with significant past medical history as below:   Past Medical History:   Diagnosis Date    Cirrhosis of liver ([address]     aug 2018      Presents with Leg Swelling (Pt c/o pain to bilateral lower extremities with gross amount of swelling. Denies fever. reports decreased appetite. Hx liver failure.  reports not taking prescribed medications because they havent been filled by the pharmacy. Gross abdominal swelling noted as well)    Admitted with [facility]epsis [facility] [A41.9]  Sepsis ([facility] [A41.9]   Found to have KACEY , so we are called for that. Seen in ER on admission. Was hypotensive in ER - giving NS. Had c/o worsening leg swelling, but no SOB,   Has he has not been compliant. Regarding: KACEY   · Duration: acute   · Location: kidneys  · Severity: severe SCR 2.1, eGFR 32   · Timing: continous  · Context (ex: related to condition): hypotension , lasix, SPNL,   · Modifying factors (ex: medications, interventions): IVF ,   · Associated signs & symptoms (ex: edema, SOB): As mentioned above in CC and HPI      Past medical, Surgical, Social, Family medical history reviewed by me. PAST MEDICAL HISTORY:   Past Medical History:   Diagnosis Date    Cirrhosis of liver ([facility]     aug 2018     PAST SURGICAL HISTORY: History reviewed. No pertinent surgical history.   FAMILY HISTORY:   Family History   Problem Relation Age of Onset    High Blood Pressure Mother      SOCIAL HISTORY:   Social History     Socioeconomic History    Marital status:      Spouse name: None    Number of children: None    Years of education: None    Highest education level: None   Occupational History    None   Social Needs    Financial resource strain: None    Food insecurity     Worry: None     Inability: None    Transportation needs     Medical: None     Non-medical: None   Tobacco Use    Smoking status: Current Every Day Smoker     Packs/day: 0.25    Smokeless tobacco: Never Used   Substance and Sexual Activity    Alcohol use: Not Currently     Frequency: Never    Drug use: Never    Sexual activity: None   Lifestyle    Physical activity     Days per week: None     Minutes per session: None    Stress: None   Relationships    Social connections     Talks on phone: None     Gets together: None     Attends [employer] service: None     Active member of club or organization: None     Attends meetings of clubs BLOODU MODERATE 04/18/2019    GLUCOSEU Negative 04/18/2019    AMORPHOUS 2+ 08/06/2018     Microalbumen/Creatinine ratio:  No components found for: RUCREAT  24 Hour Urine for Protein:  No components found for: RAWUPRO, UHRS3, TQBX67VO, UTV3  24 Hour Urine for Creatinine Clearance:  No components found for: CREAT4, UHRS10, UTV10  Urine Toxicology:  No components found for: IAMMENTA, IBARBIT, IBENZO, ICOCAINE, IMARTHC, IOPIATES, IPHENCYC    HgBA1c:  No results found for: LABA1C  RPR:  No results found for: RPR  HIV:  No results found for: HIV  AZAR:    Lab Results   Component Value Date    AZAR Negative 08/07/2018     RF:  No results found for: RF  DSDNA:  No components found for: DNA  AMYLASE:  No results found for: AMYLASE  LIPASE:    Lab Results   Component Value Date    LIPASE 12.0 08/17/2020     Fibrinogen Level:  No components found for: FIB       BELOW MENTIONED RADIOLOGY STUDY RESULTS BY ME:    Xr Chest Portable    Result Date: 8/17/2020  EXAMINATION: ONE XRAY VIEW OF THE CHEST 8/17/2020 6:23 pm COMPARISON: 04/22/2019 HISTORY: ORDERING SYSTEM PROVIDED HISTORY: general fatigue TECHNOLOGIST PROVIDED HISTORY: Reason for exam:->general fatigue Reason for Exam: vLeg Swelling (Pt c/o pain to bilateral lower extremities with gross amount of swelling. Denies fever. reports decreased appetite. Hx liver failure. reports not taking prescribed medications because they havent been filled by the pharmacy. Gross abdominal swelling noted as well) Acuity: Unknown Type of Exam: Unknown FINDINGS: Lung volumes are low accentuating heart size and bronchovascular markings at the lung bases. There is left-sided pleural effusion, likely loculated, with adjacent left lung consolidation, increased compared to prior. Volume loss is also seen in the left hemithorax Hazy right basilar opacity is seen. Increasing pleuroparenchymal disease on the left.      Ir Us Guided Paracentesis    Result Date: 8/13/2020  PROCEDURE: ULTRASOUND ascites and paracentesis was performed. The patient tolerated the procedure well. Estimated blood loss: Less than 5 cc FINDINGS: A total of 5.6 L was removed. Successful ultrasound guided paracentesis.

## 2020-08-18 NOTE — CONSULTS
Currently     Frequency: Never        Family History   Problem Relation Age of Onset    High Blood Pressure Mother         Review of Systems  Constitutional: negative for fevers, chills, sweats  + fatigue  Ears, nose, mouth, throat, and face: negative for nasal congestion and sore throat   Respiratory: negative for cough and shortness of breath   Cardiovascular: negative for chest pain and dyspnea   Gastrointestinal: see hpi   Genitourinary:negative for dysuria and frequency   Integument/breast: negative for pruritus and rash   Hematologic/lymphatic: negative for bleeding and easy bruising   Musculoskeletal:negative for arthralgias + myalgias   Neurological: negative for dizziness + weakness         Physical Exam  Blood pressure 105/66, pulse 73, temperature 98 °F (36.7 °C), temperature source Oral, resp. rate 16, height 6' 2\" (1.88 m), weight 225 lb 3.2 oz (102.2 kg), SpO2 96 %. Due to the current efforts to prevent transmission of COVID-19 and also the need to preserve PPE for other caregivers, a face-to-face encounter with the patient was not performed. All relevant records and diagnostic tests were reviewed, including laboratory results and imaging.          Data Review:    Recent Labs     08/17/20 1712 08/18/20 0157 08/18/20  0546   WBC 10.5 9.1 7.6   HGB 10.7* 10.1* 10.0*   HCT 31.7* 29.7* 29.1*   .4* 110.3* 110.1*   PLT 68* 60* 55*     Recent Labs     08/17/20 1712 08/18/20 0157 08/18/20  0546   *  126* 128* 128*   K 3.6  3.6 3.5 3.8   CL 92*  93* 95* 94*   CO2 22  24 26 23   PHOS 2.2*  --   --    BUN 24*  25* 25* 24*   CREATININE 2.1*  2.1* 1.9* 1.8*     Recent Labs     08/17/20 1712 08/18/20  0157 08/18/20  0546   AST 58* 42* 41*   ALT 26 20 18   BILITOT 10.0* 10.0* 9.8*   ALKPHOS 106 88 78     Recent Labs     08/17/20 1712   LIPASE 12.0*     Recent Labs     08/17/20  1841 08/18/20  0157 08/18/20  0546   PROTIME 17.5* 19.7* 20.4*   INR 1.50* 1.69* 1.75*     No results for input(s): PTT in the last 72 hours. No results for input(s): OCCULTBLD in the last 72 hours. Imaging Studies:                            Assessment:     Principal Problem:    Sepsis ([facility]  Active Problems:    Cirrhosis ([facility] [address]    HCAP (healthcare-associated pneumonia)    KACEY (acute kidney injury) ([facility] [address]  Resolved Problems:    * No resolved hospital problems. *    Cirrhosis - due to alcohol abuse. No GI bleeding. No prior EGD. No HE. Worsening bili (10) with stable transaminases. INR increasing (1.5 -> 1.75). May all be from worsening liver disease. Bili could be elevated from baseline due to sepsis. Ascites - noncompliant with diuretics. Has been getting weekly paracenteses every Thursday. H/o HCV - RNA level detectable at low level in 2018. KACEY  Sepsis  Pneumonia - covid pending. Recommendations:   - monitor liver enzymes, bili, INR  - f/u covid  - RUQ US when out of covid precautions  - nephrology consulted for KACEY  - financial consulted for help with meds at discharge     Discussed with Dr. Michelle Ruiz, PA-C  [facility]  I have personally performed a face to face diagnostic evaluation on this patient. I have interviewed and examined the patient and I agree with the findings and recommended plan of care. In summary, my findings and plan are the following: As above, 58 yo man known to me with alcoholic cirrhosis admitted with multiple c/o's and anasarca. He has intractable ascites in part due to non compliance with diuretic Rx. Now admitted with pneumonia/sepsis and increased anasarca with peripheral edema in addition to ascites. On exam abd distended, edema, but non tender. 3+ LE edema and scrotal edema. His labs demonstrate elevated Bilirubin and Creatinine. He needs diuresis and paracentesis when Covid r/o'ed. Agree with Nephro consult to help with diuretic regimen in setting of KACEY. He denies alcohol use or high risk behavior.   Will follow along.    MD Marianela Cordero and Alaina Mane [address]  8/18/2020

## 2020-08-18 NOTE — H&P
Poor judgment  Skin: Warm, dry, normal turgor, no rash  Brisk capillary refill, peripheral pulses palpable   Labs:  CBC:   Lab Results   Component Value Date    WBC 9.1 08/18/2020    RBC 2.70 08/18/2020    HGB 10.1 08/18/2020    HCT 29.7 08/18/2020    .3 08/18/2020    MCH 37.5 08/18/2020    MCHC 34.0 08/18/2020    RDW 16.3 08/18/2020    PLT 60 08/18/2020    MPV 9.4 08/18/2020     BMP:    Lab Results   Component Value Date     08/18/2020    K 3.5 08/18/2020    CL 95 08/18/2020    CO2 26 08/18/2020    BUN 25 08/18/2020    CREATININE 1.9 08/18/2020    CALCIUM 7.7 08/18/2020    GFRAA 43 08/18/2020    GFRAA >60 08/10/2010    LABGLOM 36 08/18/2020    GLUCOSE 143 08/18/2020     XR CHEST PORTABLE   Final Result   Increasing pleuroparenchymal disease on the left. Chest Xray:   EKG:    I visualized CXR images and EKG strips  Fogging kidney  Ventricular Rate  69 P BPM  QTc Calculation (Bazett)  439 P ms    Atrial Rate  69 P BPM  P Rowan  9 P degrees    P-R Interval  138 P ms  R Axis  -20 P degrees    QRS Duration  82 P ms  T Axis  -12 P degrees    Q-T Interval  410 P ms  Diagnosis  Normal sinus rhythmMinimal voltage criteria for LVH, may be normal variantSeptal infarct , age un. .. P     * ECHO scheduled for am    Discussed case  with ED provider    Problem List  Principal Problem:    Sepsis ([facility] [address]  Active Problems:    Cirrhosis ([facility] [address]    HCAP (healthcare-associated pneumonia)    KACEY (acute kidney injury) ([facility] [address]  Resolved Problems:    * No resolved hospital problems. *        Assessment/Plan:     1. Blood, urine, and sputum cultures collected then patient initiated on IV vancomycin and cefepime for H CAP coverage; incentive spirometry and Combivent MDI; patient also COVID-19 rule out admitted under droplet plus precautions; supplemental O2 as well as Decadron 6 mg daily    2.   Volume status remains challenging; patient noncompliant with diuretics and now with profound edema in abdomen/BLE, however KACEY and sepsis limit ability to diurese; strict I's and O's with consult placed to nephro; may benefit from albumin infusion to increase oncotic pressure; ECHO in am    3. Consult placed to GI for chronic cirrhosis with new hyperbilirubinemia/icterus; await recommendations; n.p.o. after midnight pending evaluation      DVT prophylaxis-BLE SCD due to thrombocytopenia  Code status-full code  Diet-n.p.o. midnight  IV access-PIV established in ED      Admit as inpatient. I anticipate hospitalization spanning more than two midnights for investigation and treatment of the above medically necessary diagnoses. Please note that some part of this chart was generated using Dragon dictation software. Although every effort was made to ensure the accuracy of this automated transcription, some errors in transcription may have occurred inadvertently. If you may need any clarification, please do not hesitate to contact me through [facility].        Cali Knight MD    8/18/2020 3:10 AM

## 2020-08-18 NOTE — PROGRESS NOTES
CLINICAL BEDSIDE SWALLOWING EVALUATION  Speech Therapy Department    Patient Name:  Nilson Vicente  :  1955  Pain level:denies   Medical Diagnosis:   Sepsis ([facility] [address] [A41.9]  Sepsis ([facility] [address] [A41.9]    HPI: Nilson Vicente is a 59 y.o. male who presented to the ED to be evaluated for anorexia and increased BLE edema in the setting of known alcoholic cirrhosis. The patient undergoes paracentesis almost weekly, and reports that he has not felt well since his last intervention. Unfortunately the patient has not been compliant with any of his diuretics stating that the pharmacy has not yet filled them for him. Vital signs and labs are suggestive of severe sepsis, hyperbilirubinemia, as well as KACEY. Chest x-ray revealed increasing pleural-parenchymal disease concerning for pneumonia. Hospitalist team consulted to admit this patient for a forementioned comorbidities. \"  CXR revealed:  Impression    Increasing pleuroparenchymal disease on the left. Treatment Diagnosis:  Dysphagia    Impressions:Pt was positioned upright in bed on RA. SLP eval orders received due to suspected dysphagia. He reported he typically consumes softer foods and thin liquids. He endorsed intermittent difficulty with swallowing. Per pt his intake has been poor x5 days due to \"food poisoning. \" RN reported he passed the 3oz water swallow screen however, was noted to hold liquids in oral cavity prior to swallowing. He has also been requiring his pills to be crushed in puree. Pt was placed on a Dysphagia II Minced and Moist prior to SLP evaluation. Oral mechanism examination appeared to be grossly [place] [place]. Various consistency trials were provided to assess swallow function. Pt demonstrated prolonged mastication, oral holding with delayed bolus manipulation, suspected pharyngeal pooling and delayed swallow initiation.  Intermittent slight vocal quality change was noted post multiple trials which pt was able to clear with subsequent swallows. Recommend continuation of soft solids with ongoing assessment of swallow function. Dietary Recommendations:  Continue Dysphagia II Minced and Moist with thin liquids, meds with puree     Strategies: 90 degree positioning with all p.o. intake; small bites/sips; alternate textures through meal; reduce rate of intake    Treatment/Goals: Speech therapy for dysphagia tx 3-5 times per week. ST.) Pt will tolerate recommended diet without s/s of aspiration   2.) Pt will tolerate diet advance to least restricted diet, as clinically indicated, with no signs of aspiration       Oral motor Exam:  Dentition: adequate   Labial/Facial: WFL   Lingual: WFL   Voice: WFL    Oral Phase:   Oral holding   Reduced mastication  Reduced A-P propulsion    Pharyngeal Phase:  Apparent pharyngeal pooling  Delayed swallow initiation  Decreased laryngeal elevation via palpation   Apparent decreased pharyngeal clearing  Intermittent vocal quality change     Patient/Family Education:Education, results and recommendations given to the Pt and nurse, who verbalized understanding    Timed Code Treatment: 0 minutes     Total Treatment Time: 30 minutes     Discharge Recommendations: Speech Therapy for Speech/Dysphagia treatment at discharge. If patient discharges prior to next session this note will serve as a discharge summary.       Nury Souza, [facility]  Speech Language Pathologist

## 2020-08-18 NOTE — PROGRESS NOTES
Incentive Spirometry education and demonstration completed by Respiratory Therapy Yes      Response to education: Excellent     Teaching Time: 5 minutes    Minimum Predicted Vital Capacity - 777 mL. Patient's Actual Vital Capacity - 500 mL.  Turning over to Nursing for routine follow-up No.    Comments: Goal not met    Electronically signed by Georgina Christine RCP on 8/18/2020 at 8:47 AM

## 2020-08-18 NOTE — PROGRESS NOTES
Advanced Care Planning Note. Purpose of Encounter: Advanced care planning in light of alcoholic cirrhosis  Parties In Attendance: Patient  Decisional Capacity: Yes  Subjective: Patient with swollen legs and pain in legs  Objective: Cr 1.8  Goals of Care Determination: Patient wants full support (CPR, vent, surgery, HD, trach, PEG)  Plan:  IV Abx, IV Lasix, Renal and Gi consult, Palliative care consult  Code Status: Full code   Time spent on Advanced care Plannin minutes  Advanced Care Planning Documents: Completed advanced directives on chart, Suzanne Drummond (ex wife) is the [address] [place] [address].     Robi Quesada MD  2020 3:52 PM

## 2020-08-18 NOTE — PROGRESS NOTES
Hyponatremia 8/18/2020 Yes        : other supportive care :   - Check daily renal function panel with electrolytes-phosphorus  - Strict monitoring of I/Os, daily weight  - Renal feeds/diet  - Current medications reviewed. - Nephrotoxic medications have been discontinued. - Dose adjusted and appropriate. - Dose meds for eGFR <15 mL/min/1.73m2 during KACEY    - Avoid heavy opioids due to renal failure - may use very low dose dilaudid / fentanyl with close monitoring of CNS and respiratory depression. Please refer to the orders. High Complexity. Multiple complex problems. Discussed with patient, and treatment team- referring ER team- Vicki Gomez PA-C   + Chandana Louie MD  Thank you for allowing me to participate in this patient's care. Please do not hesitate to contact me with any questions/concerns. We will follow along with you. Jackie Gr MD       [facility] [address] [place] [address] [place]: [phone] or [facility]  Fax: [phone]        ========================================================   ========================================================        This patient is COVID-19 rule out , so in a strict isolation, as per current protocol; So in order to preserve PPE supply and to avoid unnecessary exposure to prevent transmission of COVID-19; this patient was NOT examined face to face, as risk of contact outweighs the benefits and likely would not change much of my clinical management. HPI, review of system and physical exam was limited, as it was mainly obtained from chart review and the primary team. But all relevant records and diagnostic tests were reviewed, including laboratory and imaging results. Patient's care is being coordinated with the primary service. Subjective:   Seen through glass windows  Not in distress. , no SOB   Still seen w/ major edema    Past medical, Surgical, Social, Family medical history reviewed by me.    MEDICATIONS: reviewed by me. Medications Prior to Admission:  No current facility-administered medications on file prior to encounter. Current Outpatient Medications on File Prior to Encounter   Medication Sig Dispense Refill    furosemide (LASIX) 80 MG tablet Take 1 tablet by mouth daily 30 tablet 3    spironolactone (ALDACTONE) 100 MG tablet Take 1 tablet by mouth daily 30 tablet 3    potassium chloride (KLOR-CON M) 20 MEQ extended release tablet Take 1 tablet by mouth daily 30 tablet 5       Medications Prior to Admission: furosemide (LASIX) 80 MG tablet, Take 1 tablet by mouth daily  spironolactone (ALDACTONE) 100 MG tablet, Take 1 tablet by mouth daily  potassium chloride (KLOR-CON M) 20 MEQ extended release tablet, Take 1 tablet by mouth daily     Scheduled Meds:   sodium chloride flush  10 mL Intravenous 2 times per day    nicotine  1 patch Transdermal Daily    cefepime  2 g Intravenous Q12H    vancomycin  1,500 mg Intravenous Q24H    albuterol sulfate HFA  2 puff Inhalation 4x daily    And    ipratropium  2 puff Inhalation 4x daily    albumin human  25 g Intravenous Q6H    furosemide  40 mg Intravenous BID     Continuous Infusions:  PRN Meds:. REVIEW OF SYSTEMS:  As mentioned in chief complaint and HPI , Subjective       PHYSICAL EXAM:  Recent vital signs and recent I/Os reviewed by me.      Wt Readings from Last 3 Encounters:   08/18/20 225 lb 3.2 oz (102.2 kg)   08/13/20 229 lb 6.4 oz (104.1 kg)   08/06/20 228 lb (103.4 kg)     BP Readings from Last 3 Encounters:   08/18/20 102/65   08/13/20 112/78   08/06/20 97/73     Patient Vitals for the past 24 hrs:   BP Temp Temp src Pulse Resp SpO2 Height Weight   08/18/20 1545 102/65 97.9 °F (36.6 °C) Oral 77 16 95 % -- --   08/18/20 1525 -- -- -- -- 16 97 % -- --   08/18/20 1156 -- -- -- -- 16 96 % -- --   08/18/20 1130 103/64 98 °F (36.7 °C) Oral 68 16 96 % -- --   08/18/20 1015 -- -- -- -- -- -- -- 225 lb 3.2 oz (102.2 kg)   08/18/20 0845 -- -- -- -- 16 96 % -- --   08/18/20 0745 105/66 98 °F (36.7 °C) Oral 73 16 96 % -- --   08/18/20 0650 100/65 97.9 °F (36.6 °C) Oral 70 16 97 % -- --   08/18/20 0100 -- -- -- -- -- -- 6' 2\" (1.88 m) --   08/18/20 0058 97/68 97.9 °F (36.6 °C) Oral 66 18 96 % -- --   08/17/20 2330 87/61 -- -- 66 20 96 % -- --   08/17/20 2300 92/60 -- -- 65 21 97 % -- --   08/17/20 2200 88/62 -- -- 64 20 97 % -- --   08/17/20 2130 99/63 -- -- 69 20 97 % -- --   08/17/20 2100 98/66 -- -- 68 20 98 % -- --   08/17/20 2030 90/65 -- -- 69 19 96 % -- --   08/17/20 2015 -- -- -- 70 20 97 % -- --   08/17/20 2000 -- -- -- 70 19 97 % -- --   08/17/20 1945 -- -- -- 69 19 97 % -- --   08/17/20 1930 87/62 -- -- 68 19 97 % -- --   08/17/20 1839 100/65 -- -- 69 -- 99 % -- --   08/17/20 1652 95/61 98.1 °F (36.7 °C) Oral 69 20 98 % -- 236 lb (107 kg)       Intake/Output Summary (Last 24 hours) at 8/18/2020 1620  Last data filed at 8/18/2020 1144  Gross per 24 hour   Intake 0 ml   Output 350 ml   Net -350 ml          Physical exam:  Bedside physical examination deferred. However, I did visually inspect the patient and observed the following:      Vitals signs reviewed. Constitutional:       General: not in acute distress. Appearance:   ill-appearing. HENT:      Head: Normocephalic and atraumatic. Nose: Nose normal.      Mouth/Throat:      Mouth: Mucous membranes are moist.   Eyes:      General: No scleral icterus. Conjunctiva/sclera: Conjunctivae normal.   Neck:      Musculoskeletal: Normal range of motion and neck supple. Cardiovascular:      Rate and Rhythm: Normal rate. Pulmonary:      Effort: Pulmonary effort is normal.   Abdominal:      General: There is  distension. Musculoskeletal:   Extremities/MSK: 3+ Edema, no cyanosis. Skin:     Coloration: Skin is not jaundiced. Neurological:      General: No focal deficit present.           DATA:  Diagnostic tests reviewed by me for today's visit:    Recent Labs     08/17/20  6056 08/17/2020    GLUCOSEU 100 08/17/2020    AMORPHOUS 2+ 08/06/2018     Microalbumen/Creatinine ratio:  No components found for: RUCREAT  24 Hour Urine for Protein:  No components found for: RAWUPRO, UHRS3, MFIX20ZC, UTV3  24 Hour Urine for Creatinine Clearance:  No components found for: CREAT4, UHRS10, UTV10  Urine Toxicology:  No components found for: IAMMENTA, IBARBIT, IBENZO, ICOCAINE, IMARTHC, IOPIATES, IPHENCYC    HgBA1c:  No results found for: LABA1C  RPR:  No results found for: RPR  HIV:  No results found for: HIV  AZAR:    Lab Results   Component Value Date    AZAR Negative 08/07/2018     RF:  No results found for: RF  DSDNA:  No components found for: DNA  AMYLASE:  No results found for: AMYLASE  LIPASE:    Lab Results   Component Value Date    LIPASE 12.0 08/17/2020     Fibrinogen Level:  No components found for: FIB       BELOW MENTIONED RADIOLOGY STUDY RESULTS BY ME:    Xr Chest Portable    Result Date: 8/17/2020  EXAMINATION: ONE XRAY VIEW OF THE CHEST 8/17/2020 6:23 pm COMPARISON: 04/22/2019 HISTORY: ORDERING SYSTEM PROVIDED HISTORY: general fatigue TECHNOLOGIST PROVIDED HISTORY: Reason for exam:->general fatigue Reason for Exam: vLeg Swelling (Pt c/o pain to bilateral lower extremities with gross amount of swelling. Denies fever. reports decreased appetite. Hx liver failure. reports not taking prescribed medications because they havent been filled by the pharmacy. Gross abdominal swelling noted as well) Acuity: Unknown Type of Exam: Unknown FINDINGS: Lung volumes are low accentuating heart size and bronchovascular markings at the lung bases. There is left-sided pleural effusion, likely loculated, with adjacent left lung consolidation, increased compared to prior. Volume loss is also seen in the left hemithorax Hazy right basilar opacity is seen. Increasing pleuroparenchymal disease on the left.      Ir Us Guided Paracentesis    Result Date: 8/13/2020  PROCEDURE: ULTRASOUND GUIDED PARACENTESIS 8/13/2020 performed. The patient tolerated the procedure well. Estimated blood loss: Less than 5 cc FINDINGS: A total of 5.6 L was removed. Successful ultrasound guided paracentesis.

## 2020-08-18 NOTE — ED PROVIDER NOTES
[facility] Emergency Department      Pt Name: William Santos  MRN: [id number]  [place] 1955  Date of evaluation: 8/17/2020  Provider: Monica Amaya MD  I independently performed a history and physical on William Santos. All diagnostic, treatment, and disposition decisions were made by myself in conjunction with the advanced practice provider. HPI: William Santos presented with   Chief Complaint   Patient presents with    Leg Swelling     Pt c/o pain to bilateral lower extremities with gross amount of swelling. Denies fever. reports decreased appetite. Hx liver failure. reports not taking prescribed medications because they havent been filled by the pharmacy. Gross abdominal swelling noted as well     William Santos has a past medical history of Cirrhosis of liver ([facility] [address] He has no past surgical history on file. No current facility-administered medications on file prior to encounter.       Current Outpatient Medications on File Prior to Encounter   Medication Sig Dispense Refill    furosemide (LASIX) 80 MG tablet Take 1 tablet by mouth daily 30 tablet 3    spironolactone (ALDACTONE) 100 MG tablet Take 1 tablet by mouth daily 30 tablet 3    potassium chloride (KLOR-CON M) 20 MEQ extended release tablet Take 1 tablet by mouth daily 30 tablet 5     PHYSICAL EXAM  Vitals: /65   Pulse 69   Temp 98.1 °F (36.7 °C) (Oral)   Resp 20   Wt 236 lb (107 kg)   SpO2 99%   BMI 30.30   Constitutional:  59 y.o. male alert, cooperative, chronically ill appearing  HENT:  Atraumatic scalp, mucous membranes dry  Eyes:   Conjunctiva clear, icterus  Neck:  Supple, no visible JVD, no signs of injury  Cardiovascular:  Regular, no rubs  Thorax & Lungs:  No accessory muscle usage, decreased basilar bs  Abdomen:  Soft, distended consistent with chronic liver disease, NT, umbilical hernia that reduces  Back:  No deformity  Genitalia:  Deferred  Rectal:  Deferred  Extremities:  No Performed at:  [facility]  [address] [address], [address]   Phone [phone]   URINE RT REFLEX TO CULTURE   FERRITIN   COVID-19   PROTIME-INR   APTT   FIBRINOGEN   D-DIMER, QUANTITATIVE   LACTIC ACID, PLASMA   RENAL FUNCTION PANEL   URINALYSIS WITH MICROSCOPIC   UREA NITROGEN, URINE   ELECTROLYTES URINE RANDOM   URIC ACID   CREATININE, RANDOM URINE   RENAL FUNCTION PANEL   TYPE AND SCREEN   ANTIBODY SCREEN     RADIOLOGY:     Plain x-rays were viewed by me:   XR CHEST PORTABLE   Final Result   Increasing pleuroparenchymal disease on the left. EKG:  Read by me in the absence of a cardiologist shows: Sinus rhythm, rate 69, intervals normal, axis left, poor R wave progression in septal leads, no acute injury pattern, minimal change when compared to April 2019    CRITICAL CARE:   Total critical care time of 31 minutes (excludes any time for procedures). This includes but not limited to vital sign monitoring, medication, clinical response to medications, interpretation of diagnostics, review of nursing notes, pertinent record review, discussions about patient condition, consultation time, documentation time. Critical care due to the patient's KACEY, fatigue, liver failure.     Vitals:    08/17/20 1945 08/17/20 2000 08/17/20 2015 08/17/20 2030   BP:    90/65   Pulse: 69 70 70 69   Resp: 19 19 20 19   Temp:       TempSrc:       SpO2: 97% 97% 97% 96%   Weight:         Medications administered:  Medications   cefepime (MAXIPIME) 2 g IVPB minibag (has no administration in time range)   vancomycin (VANCOCIN) 1,500 mg in dextrose 5 % 250 mL IVPB (has no administration in time range)   albumin human 5 % IV solution 25 g (has no administration in time range)   albumin human 25 % IV solution 25 g (has no administration in time range)   fentaNYL (SUBLIMAZE) injection 50 mcg (50 mcg Intravenous Given 8/17/20 5996)   0.9 % sodium chloride bolus (500 mLs Intravenous New Bag 8/17/20 1930)     FINAL IMPRESSION:    1. Severe sepsis ([address]    2. Pneumonia due to organism    3.  Acute kidney injury [facility])      DISPOSITION Decision To Admit 08/17/2020 07:49:51 PM       Layla Cunningham MD  08/17/20 3714

## 2020-08-18 NOTE — PROGRESS NOTES
[facility]    8/18/2020 3:46 PM        Name: Yaron Estrada . Admitted: 8/17/2020  Primary Care Provider: No primary care provider on file. (Tel: None)    Brief Course:  58 yo M with alcoholic cirrhosis, non compliance came to ER with complaints of anasarca. Admitted as inpatient for HCAP, sepsis and KACEY. COVID 19 ordered on 8/18 from ED. GI and Renal consutled      CC: BL LE edema, scrotal swelling, loss of appetite    Subjective:  . Patient complaining of pain in legs and thighs. No CP, HA or fevers. Some abdominal pain.     Reviewed interval ancillary notes    Current Medications  sodium chloride flush 0.9 % injection 10 mL, 2 times per day  sodium chloride flush 0.9 % injection 10 mL, PRN  acetaminophen (TYLENOL) tablet 650 mg, Q6H PRN    Or  acetaminophen (TYLENOL) suppository 650 mg, Q6H PRN  senna (SENOKOT) tablet 8.6 mg, Daily PRN  ondansetron (ZOFRAN-ODT) disintegrating tablet 4 mg, Q8H PRN    Or  ondansetron (ZOFRAN) injection 4 mg, Q6H PRN  nicotine (NICODERM CQ) 21 MG/24HR 1 patch, Daily  cefepime (MAXIPIME) 2 g IVPB minibag, Q12H  vancomycin (VANCOCIN) 1,500 mg in dextrose 5 % 250 mL IVPB, Q24H  perflutren lipid microspheres (DEFINITY) injection 1.65 mg, ONCE PRN  albuterol sulfate  (90 Base) MCG/ACT inhaler 2 puff, 4x daily    And  ipratropium (ATROVENT HFA) 17 MCG/ACT inhaler 2 puff, 4x daily  albumin human 5 % IV solution 25 g, Q6H  morphine (PF) injection 2 mg, Q4H PRN        Objective:  /64   Pulse 68   Temp 98 °F (36.7 °C) (Oral)   Resp 16   Ht 6' 2\" (1.88 m)   Wt 225 lb 3.2 oz (102.2 kg)   SpO2 97%   BMI 28.91 kg/m²     Intake/Output Summary (Last 24 hours) at 8/18/2020 1546  Last data filed at 8/18/2020 1144  Gross per 24 hour   Intake 0 ml   Output 350 ml   Net -350 ml      Wt Readings from Last 3 Encounters:   08/18/20 225 lb 3.2 oz (102.2 kg) 08/13/20 229 lb 6.4 oz (104.1 kg)   08/06/20 228 lb (103.4 kg)       General appearance:  Appears comfortable, chronically ill appearing  Eyes: Sclera clear. Pupils equal.  ENT: Moist oral mucosa. Trachea midline, no adenopathy. Bitemporal wasting  Cardiovascular: Regular rhythm, normal S1, S2. No murmur. 4+ BL LE edema  Respiratory: Not using accessory muscles. Good inspiratory effort. Clear to auscultation bilaterally, no wheeze or crackles. GI: Abdomen soft, no tenderness, distended, normal bowel sounds, ascites  Musculoskeletal: No cyanosis in digits, neck supple  Neurology: Grossly intact. No speech or motor deficits  Psych: Normal affect. Alert and oriented in time, place and person  Skin: Warm, dry, normal turgor  Extremity exam shows brisk capillary refill. Peripheral pulses are palpable in lower extremities     Labs and Tests:  CBC:   Recent Labs     08/17/20 1712 08/18/20  0157 08/18/20  0546   WBC 10.5 9.1 7.6   HGB 10.7* 10.1* 10.0*   PLT 68* 60* 55*     BMP:    Recent Labs     08/17/20 1712 08/18/20  0157 08/18/20  0546   *  126* 128* 128*   K 3.6  3.6 3.5 3.8   CL 92*  93* 95* 94*   CO2 22  24 26 23   BUN 24*  25* 25* 24*   CREATININE 2.1*  2.1* 1.9* 1.8*   GLUCOSE 159*  163* 143* 138*     Hepatic:   Recent Labs     08/17/20 1712 08/18/20  0157 08/18/20  0546   AST 58* 42* 41*   ALT 26 20 18   BILITOT 10.0* 10.0* 9.8*   ALKPHOS 106 88 78     XR CHEST PORTABLE   Final Result   Increasing pleuroparenchymal disease on the left. US GALLBLADDER RUQ    (Results Pending)         Problem List  Principal Problem:    Sepsis ([facility] [address]  Active Problems:    Cirrhosis ([facility]    Anasarca    HCAP (healthcare-associated pneumonia)    KACEY (acute kidney injury) ([address]    Anemia    Thrombocytopenia (HCC)    Coagulopathy (HCC)    Alcoholic cirrhosis ([address]    Hyponatremia  Resolved Problems:    * No resolved hospital problems. *       Assessment & Plan:   1. Cont Cefepime and Vanco for ? HCAP  2. Lasix 40 mg IV bid for anasarca  3. Renal consult for KACEY  4. GI consult for anasarca/cirrhosis  5. Morphine IV PRN pain  6. Droplet plus iso until COVID from 8/18 r/o  7. Cont Albumin IV  8. PT/OT  9. Will exclude SBP when COVID negative  10. Does not follow up as requested as OP  11. Palliative care consult    IV Access: Peripheral  Stack: No  Diet: DIET LOW SODIUM 2 GM; Dysphagia Minced and Moist  Code:Full Code  DVT PPX SCD  Disposition Home with home PT/OT/VNS/HHA    Discussed with patient, Dr Alejandro Ortega (GI) and nursing. Poor prognosis here. His noncompliance with end stage disease is terminal condition. This is all palliative in nature.     Matthew Francois MD   8/18/2020 3:46 PM

## 2020-08-19 ENCOUNTER — APPOINTMENT (OUTPATIENT)
Dept: ULTRASOUND IMAGING | Age: 65
DRG: 871 | End: 2020-08-19
Payer: MEDICARE

## 2020-08-19 ENCOUNTER — APPOINTMENT (OUTPATIENT)
Dept: INTERVENTIONAL RADIOLOGY/VASCULAR | Age: 65
DRG: 871 | End: 2020-08-19
Payer: MEDICARE

## 2020-08-19 LAB
A/G RATIO: 0.9 (ref 1.1–2.2)
ALBUMIN FLUID: 0.6 G/DL
ALBUMIN SERPL-MCNC: 3.3 G/DL (ref 3.4–5)
ALP BLD-CCNC: 68 U/L (ref 40–129)
ALT SERPL-CCNC: 17 U/L (ref 10–40)
ANION GAP SERPL CALCULATED.3IONS-SCNC: 12 MMOL/L (ref 3–16)
APPEARANCE FLUID: NORMAL
APTT: 50.9 SEC (ref 24.2–36.2)
AST SERPL-CCNC: 34 U/L (ref 15–37)
BASOPHILS ABSOLUTE: 0 K/UL (ref 0–0.2)
BASOPHILS RELATIVE PERCENT: 0.1 %
BILIRUB SERPL-MCNC: 9.1 MG/DL (ref 0–1)
BILIRUBIN DIRECT: 4.4 MG/DL (ref 0–0.3)
BILIRUBIN, INDIRECT: 4.7 MG/DL (ref 0–1)
BUN BLDV-MCNC: 32 MG/DL (ref 7–20)
CALCIUM SERPL-MCNC: 7.8 MG/DL (ref 8.3–10.6)
CELL COUNT FLUID TYPE: NORMAL
CHLORIDE BLD-SCNC: 96 MMOL/L (ref 99–110)
CLOT EVALUATION: NORMAL
CO2: 22 MMOL/L (ref 21–32)
COLOR FLUID: YELLOW
CREAT SERPL-MCNC: 1.8 MG/DL (ref 0.8–1.3)
D DIMER: 1647 NG/ML DDU (ref 0–229)
EOSINOPHILS ABSOLUTE: 0 K/UL (ref 0–0.6)
EOSINOPHILS RELATIVE PERCENT: 0 %
FIBRINOGEN: 175 MG/DL (ref 200–397)
FLUID TYPE: NORMAL
GFR AFRICAN AMERICAN: 46
GFR NON-AFRICAN AMERICAN: 38
GLOBULIN: 3.7 G/DL
GLUCOSE BLD-MCNC: 177 MG/DL (ref 70–99)
HCT VFR BLD CALC: 27.1 % (ref 40.5–52.5)
HEMOGLOBIN: 9.3 G/DL (ref 13.5–17.5)
INR BLD: 2.1 (ref 0.86–1.14)
LV EF: 58 %
LVEF MODALITY: NORMAL
LYMPHOCYTES ABSOLUTE: 1.1 K/UL (ref 1–5.1)
LYMPHOCYTES RELATIVE PERCENT: 9.5 %
LYMPHOCYTES, BODY FLUID: 2 %
MACROPHAGE FLUID: 50 %
MCH RBC QN AUTO: 38.2 PG (ref 26–34)
MCHC RBC AUTO-ENTMCNC: 34.4 G/DL (ref 31–36)
MCV RBC AUTO: 110.9 FL (ref 80–100)
MONOCYTES ABSOLUTE: 2.4 K/UL (ref 0–1.3)
MONOCYTES RELATIVE PERCENT: 20 %
MONONUCLEAR UNIDENTIFIED CELLS FLUID: 1 %
NEUTROPHIL, FLUID: 47 %
NEUTROPHILS ABSOLUTE: 8.3 K/UL (ref 1.7–7.7)
NEUTROPHILS RELATIVE PERCENT: 70.4 %
NRBC FLUID: 1 %
NUCLEATED CELLS FLUID: 485 /CUMM
NUMBER OF CELLS COUNTED FLUID: 100
PDW BLD-RTO: 16.5 % (ref 12.4–15.4)
PLATELET # BLD: 53 K/UL (ref 135–450)
PMV BLD AUTO: 9.2 FL (ref 5–10.5)
POTASSIUM REFLEX MAGNESIUM: 3.9 MMOL/L (ref 3.5–5.1)
PROTEIN FLUID: 1.7 G/DL
PROTHROMBIN TIME: 24.6 SEC (ref 10–13.2)
RBC # BLD: 2.44 M/UL (ref 4.2–5.9)
RBC FLUID: 2200 /CUMM
SODIUM BLD-SCNC: 130 MMOL/L (ref 136–145)
TOTAL PROTEIN: 7 G/DL (ref 6.4–8.2)
VOLUME: 2 ML
WBC # BLD: 11.8 K/UL (ref 4–11)

## 2020-08-19 PROCEDURE — 6360000002 HC RX W HCPCS: Performed by: HOSPITALIST

## 2020-08-19 PROCEDURE — 6370000000 HC RX 637 (ALT 250 FOR IP): Performed by: HOSPITALIST

## 2020-08-19 PROCEDURE — 80053 COMPREHEN METABOLIC PANEL: CPT

## 2020-08-19 PROCEDURE — 85379 FIBRIN DEGRADATION QUANT: CPT

## 2020-08-19 PROCEDURE — 87449 NOS EACH ORGANISM AG IA: CPT

## 2020-08-19 PROCEDURE — 2060000000 HC ICU INTERMEDIATE R&B

## 2020-08-19 PROCEDURE — 82042 OTHER SOURCE ALBUMIN QUAN EA: CPT

## 2020-08-19 PROCEDURE — 2500000003 HC RX 250 WO HCPCS: Performed by: INTERNAL MEDICINE

## 2020-08-19 PROCEDURE — 85730 THROMBOPLASTIN TIME PARTIAL: CPT

## 2020-08-19 PROCEDURE — 6360000002 HC RX W HCPCS: Performed by: INTERNAL MEDICINE

## 2020-08-19 PROCEDURE — 2580000003 HC RX 258: Performed by: HOSPITALIST

## 2020-08-19 PROCEDURE — 0W9G3ZZ DRAINAGE OF PERITONEAL CAVITY, PERCUTANEOUS APPROACH: ICD-10-PCS | Performed by: RADIOLOGY

## 2020-08-19 PROCEDURE — 94761 N-INVAS EAR/PLS OXIMETRY MLT: CPT

## 2020-08-19 PROCEDURE — 76705 ECHO EXAM OF ABDOMEN: CPT

## 2020-08-19 PROCEDURE — 94640 AIRWAY INHALATION TREATMENT: CPT

## 2020-08-19 PROCEDURE — 87070 CULTURE OTHR SPECIMN AEROBIC: CPT

## 2020-08-19 PROCEDURE — C1729 CATH, DRAINAGE: HCPCS

## 2020-08-19 PROCEDURE — 89051 BODY FLUID CELL COUNT: CPT

## 2020-08-19 PROCEDURE — 87015 SPECIMEN INFECT AGNT CONCNTJ: CPT

## 2020-08-19 PROCEDURE — 85384 FIBRINOGEN ACTIVITY: CPT

## 2020-08-19 PROCEDURE — 85610 PROTHROMBIN TIME: CPT

## 2020-08-19 PROCEDURE — 93306 TTE W/DOPPLER COMPLETE: CPT

## 2020-08-19 PROCEDURE — 85025 COMPLETE CBC W/AUTO DIFF WBC: CPT

## 2020-08-19 PROCEDURE — 51702 INSERT TEMP BLADDER CATH: CPT

## 2020-08-19 PROCEDURE — 36415 COLL VENOUS BLD VENIPUNCTURE: CPT

## 2020-08-19 PROCEDURE — P9045 ALBUMIN (HUMAN), 5%, 250 ML: HCPCS | Performed by: INTERNAL MEDICINE

## 2020-08-19 PROCEDURE — 49083 ABD PARACENTESIS W/IMAGING: CPT

## 2020-08-19 PROCEDURE — 84157 ASSAY OF PROTEIN OTHER: CPT

## 2020-08-19 PROCEDURE — 92526 ORAL FUNCTION THERAPY: CPT

## 2020-08-19 PROCEDURE — 87205 SMEAR GRAM STAIN: CPT

## 2020-08-19 RX ORDER — LIDOCAINE HYDROCHLORIDE 10 MG/ML
5 INJECTION, SOLUTION EPIDURAL; INFILTRATION; INTRACAUDAL; PERINEURAL ONCE
Status: COMPLETED | OUTPATIENT
Start: 2020-08-19 | End: 2020-08-19

## 2020-08-19 RX ORDER — IPRATROPIUM BROMIDE AND ALBUTEROL SULFATE 2.5; .5 MG/3ML; MG/3ML
1 SOLUTION RESPIRATORY (INHALATION)
Status: DISCONTINUED | OUTPATIENT
Start: 2020-08-19 | End: 2020-08-29 | Stop reason: HOSPADM

## 2020-08-19 RX ORDER — CALCIUM CARBONATE 200(500)MG
500 TABLET,CHEWABLE ORAL 3 TIMES DAILY PRN
Status: DISCONTINUED | OUTPATIENT
Start: 2020-08-19 | End: 2020-08-29 | Stop reason: HOSPADM

## 2020-08-19 RX ORDER — ALBUMIN, HUMAN INJ 5% 5 %
12.5 SOLUTION INTRAVENOUS EVERY 12 HOURS
Status: COMPLETED | OUTPATIENT
Start: 2020-08-19 | End: 2020-08-20

## 2020-08-19 RX ORDER — HYDROMORPHONE HYDROCHLORIDE 1 MG/ML
1 INJECTION, SOLUTION INTRAMUSCULAR; INTRAVENOUS; SUBCUTANEOUS EVERY 4 HOURS PRN
Status: DISCONTINUED | OUTPATIENT
Start: 2020-08-19 | End: 2020-08-29

## 2020-08-19 RX ADMIN — Medication 2 PUFF: at 12:13

## 2020-08-19 RX ADMIN — ALBUMIN (HUMAN) 25 G: 12.5 INJECTION, SOLUTION INTRAVENOUS at 02:01

## 2020-08-19 RX ADMIN — ALBUMIN (HUMAN) 25 G: 12.5 INJECTION, SOLUTION INTRAVENOUS at 11:15

## 2020-08-19 RX ADMIN — ANTACID TABLETS 500 MG: 500 TABLET, CHEWABLE ORAL at 03:00

## 2020-08-19 RX ADMIN — ALBUMIN (HUMAN) 12.5 G: 12.5 INJECTION, SOLUTION INTRAVENOUS at 13:35

## 2020-08-19 RX ADMIN — CEFEPIME HYDROCHLORIDE 2 G: 2 INJECTION, POWDER, FOR SOLUTION INTRAVENOUS at 14:43

## 2020-08-19 RX ADMIN — HYDROMORPHONE HYDROCHLORIDE 1 MG: 1 INJECTION, SOLUTION INTRAMUSCULAR; INTRAVENOUS; SUBCUTANEOUS at 11:25

## 2020-08-19 RX ADMIN — LIDOCAINE HYDROCHLORIDE 5 ML: 10 INJECTION, SOLUTION EPIDURAL; INFILTRATION; INTRACAUDAL; PERINEURAL at 14:11

## 2020-08-19 RX ADMIN — Medication 10 ML: at 03:00

## 2020-08-19 RX ADMIN — HYDROMORPHONE HYDROCHLORIDE 1 MG: 1 INJECTION, SOLUTION INTRAMUSCULAR; INTRAVENOUS; SUBCUTANEOUS at 19:42

## 2020-08-19 RX ADMIN — Medication 10 ML: at 22:18

## 2020-08-19 RX ADMIN — FUROSEMIDE 40 MG: 10 INJECTION, SOLUTION INTRAMUSCULAR; INTRAVENOUS at 18:14

## 2020-08-19 RX ADMIN — Medication 10 ML: at 11:17

## 2020-08-19 RX ADMIN — FUROSEMIDE 40 MG: 10 INJECTION, SOLUTION INTRAMUSCULAR; INTRAVENOUS at 11:16

## 2020-08-19 RX ADMIN — Medication 2 PUFF: at 12:00

## 2020-08-19 RX ADMIN — MORPHINE SULFATE 2 MG: 2 INJECTION, SOLUTION INTRAMUSCULAR; INTRAVENOUS at 03:00

## 2020-08-19 RX ADMIN — VANCOMYCIN HYDROCHLORIDE 1500 MG: 10 INJECTION, POWDER, LYOPHILIZED, FOR SOLUTION INTRAVENOUS at 22:19

## 2020-08-19 RX ADMIN — Medication 10 ML: at 11:18

## 2020-08-19 RX ADMIN — MORPHINE SULFATE 2 MG: 2 INJECTION, SOLUTION INTRAMUSCULAR; INTRAVENOUS at 09:51

## 2020-08-19 RX ADMIN — HYDROMORPHONE HYDROCHLORIDE 1 MG: 1 INJECTION, SOLUTION INTRAMUSCULAR; INTRAVENOUS; SUBCUTANEOUS at 15:29

## 2020-08-19 ASSESSMENT — PAIN DESCRIPTION - PAIN TYPE
TYPE: ACUTE PAIN

## 2020-08-19 ASSESSMENT — PAIN DESCRIPTION - PROGRESSION
CLINICAL_PROGRESSION: NOT CHANGED

## 2020-08-19 ASSESSMENT — PAIN DESCRIPTION - FREQUENCY
FREQUENCY: CONTINUOUS

## 2020-08-19 ASSESSMENT — PAIN DESCRIPTION - LOCATION
LOCATION: ABDOMEN;BACK
LOCATION: GENERALIZED
LOCATION: ABDOMEN;BACK;FOOT
LOCATION: ABDOMEN;BACK;GENERALIZED
LOCATION: ABDOMEN;BACK

## 2020-08-19 ASSESSMENT — PAIN DESCRIPTION - DESCRIPTORS
DESCRIPTORS: ACHING;BURNING

## 2020-08-19 ASSESSMENT — PAIN SCALES - GENERAL
PAINLEVEL_OUTOF10: 10
PAINLEVEL_OUTOF10: 9
PAINLEVEL_OUTOF10: 10
PAINLEVEL_OUTOF10: 8
PAINLEVEL_OUTOF10: 10
PAINLEVEL_OUTOF10: 10

## 2020-08-19 ASSESSMENT — PAIN DESCRIPTION - ONSET
ONSET: ON-GOING

## 2020-08-19 NOTE — PROGRESS NOTES
3700ml of fluid removed  Spoke to patients RN 3T and advised her the amount of fluid removed and that patient was returning to the floor.

## 2020-08-19 NOTE — PROGRESS NOTES
MD Sandra Rushing MD Idolina Magyar, MD               Office: [phone]                      Fax: [phone]             [facility]                   NEPHROLOGY INPATIENT PROGRESS NOTE:     PATIENT NAME: Hugo Marvin  : 1955  MRN: [id number]       IMPRESSION:       KACEY (on no CKD): Improving to stable now   - still Oligouric   - BL Scr- 0.9  ---> 2.1 on admission  : Etiology of KACEY - presumed pre-renal / ATN w/ hypotensive / hypovolemia / diuretics     - other differentials: ? HRS  W/ Liver cirhosis  - severe as TB - 10 - S.albumin 1.8   + bile induced nephropathy   - UA: large thu, urobili, high chance of bili-induced ATN   - Urine lytes:very lower = pre-renal, likely HR syndrome     Associated problems:   - Azotemia: moderate - better   - Electrolytes: K: WNL - on K supplements  : monitor Mag in am      - Volume status: ?volemic - was hypotensive in ER,   + severe leg edema + anasarca   : d/to lower albumin, liver disease  : unlikely NS  : US ABD:   Heterogeneous nodular appearance of the liver compatible with known cirrhosis. Gallbladder and common duct appear grossly unremarkable in appearance. Moderate amount of ascites.       : Na: Hyponatremia - d/to KACEY + cirrhosis - moderate - follow as better     - Acid-Base: stable ,   : LA elevated     - Anemia: mild        RECOMMENDATIONS:    - w/ his anasarca - Added lasix, continue w/ IV albumin    - consider repeat paracentesis   - continue holding SPNL, K repletion   - needs to strictly monitor PVR for bladder retention,    - abx for PNA     - no need for dialysis  - at higher risk for worsening needing close monitoring   - overall poor prognosis d/to liver disease       Other problems: Management per primary and other consulting teams.    Hospital Problems           Last Modified POA    * (Principal) Sepsis ([address] 2020 Yes    Cirrhosis ([address] 2020 Yes    Anasarca 2020 Yes HCAP (healthcare-associated pneumonia) 8/17/2020 Yes    KACEY (acute kidney injury) ([facility] 8/17/2020 Yes    Anemia 8/18/2020 Yes    Thrombocytopenia ([facility] 8/18/2020 Yes    Coagulopathy ([facility] 3/47/6377 Yes    Alcoholic cirrhosis ([facility] 3/45/8321 Yes    Hyponatremia 8/18/2020 Yes        : other supportive care :   - Check daily renal function panel with electrolytes-phosphorus  - Strict monitoring of I/Os, daily weight  - Renal feeds/diet  - Current medications reviewed. - Nephrotoxic medications have been discontinued. - Dose adjusted and appropriate. - Dose meds for eGFR <15 mL/min/1.73m2 during KACEY    - Avoid heavy opioids due to renal failure - may use very low dose dilaudid / fentanyl with close monitoring of CNS and respiratory depression. Please refer to the orders. High Complexity. Multiple complex problems. Discussed with patient, and treatment team   Thank you for allowing me to participate in this patient's care. Please do not hesitate to contact me with any questions/concerns. We will follow along with you. Kayden Sheffield MD       [facility] [address] [place]: [phone] or [address]  Fax: [phone]        ========================================================   ========================================================        Subjective:   Seen and examined   resting in bed,   Still no SOB   But still w/ major edema    Past medical, Surgical, Social, Family medical history reviewed by me. MEDICATIONS: reviewed by me. Medications Prior to Admission:  No current facility-administered medications on file prior to encounter.       Current Outpatient Medications on File Prior to Encounter   Medication Sig Dispense Refill    furosemide (LASIX) 80 MG tablet Take 1 tablet by mouth daily 30 tablet 3    spironolactone (ALDACTONE) 100 MG tablet Take 1 tablet by mouth daily 30 tablet 3    potassium chloride (KLOR-CON M) 20 MEQ extended release tablet Take 1 tablet by mouth daily 30 tablet 5       Medications Prior to Admission: furosemide (LASIX) 80 MG tablet, Take 1 tablet by mouth daily  spironolactone (ALDACTONE) 100 MG tablet, Take 1 tablet by mouth daily  potassium chloride (KLOR-CON M) 20 MEQ extended release tablet, Take 1 tablet by mouth daily     Scheduled Meds:   sodium chloride flush  10 mL Intravenous 2 times per day    nicotine  1 patch Transdermal Daily    cefepime  2 g Intravenous Q12H    vancomycin  1,500 mg Intravenous Q24H    albuterol sulfate HFA  2 puff Inhalation 4x daily    And    ipratropium  2 puff Inhalation 4x daily    albumin human  25 g Intravenous Q6H    furosemide  40 mg Intravenous BID     Continuous Infusions:  PRN Meds:. REVIEW OF SYSTEMS:  As mentioned in chief complaint and HPI , Subjective       PHYSICAL EXAM:  Recent vital signs and recent I/Os reviewed by me.      Wt Readings from Last 3 Encounters:   08/18/20 225 lb 3.2 oz (102.2 kg)   08/13/20 229 lb 6.4 oz (104.1 kg)   08/06/20 228 lb (103.4 kg)     BP Readings from Last 3 Encounters:   08/19/20 111/68   08/13/20 112/78   08/06/20 97/73     Patient Vitals for the past 24 hrs:   BP Temp Temp src Pulse Resp SpO2 Weight   08/19/20 0626 111/68 98.1 °F (36.7 °C) Oral 62 17 93 % --   08/19/20 0300 106/71 -- -- 67 17 92 % --   08/19/20 0155 100/65 97.9 °F (36.6 °C) Oral 69 16 93 % --   08/18/20 2256 107/68 97.9 °F (36.6 °C) Oral 72 17 95 % --   08/18/20 2020 105/66 97.8 °F (36.6 °C) Oral 70 18 93 % --   08/18/20 1815 101/65 -- -- 70 16 96 % --   08/18/20 1545 102/65 97.9 °F (36.6 °C) Oral 77 16 95 % --   08/18/20 1525 -- -- -- -- 16 97 % --   08/18/20 1156 -- -- -- -- 16 96 % --   08/18/20 1130 103/64 98 °F (36.7 °C) Oral 68 16 96 % --   08/18/20 1015 -- -- -- -- -- -- 225 lb 3.2 oz (102.2 kg)       Intake/Output Summary (Last 24 hours) at 8/19/2020 0944  Last data filed at 8/19/2020 0811  Gross per 24 hour   Intake 500 ml   Output 425 ml   Net 75 ml          Physical exam:  General: Awake, Alert,   HENT: Atraumatic, normocephalic   Eyes: Normal conjunctiva, Non-incteral sclera. Neck: Supple, JVD not visible. CVS:  Heart sounds are normal. No murmurs. No pericardial rub. RS: Normal respiratory efforts,  Lung fields are clear. Abd: Soft , bowel sounds are normal, no distension and no tenderness to palpation. Skin: No rash ,  bruises,   CNS: Awake Oriented , No focal.   Extremities/MSK: 3+ Edema, no cyanosis. DATA:  Diagnostic tests reviewed by me for today's visit:    Recent Labs     08/17/20 1712 08/18/20  0157 08/18/20  0546 08/19/20  0541   WBC 10.5 9.1 7.6 11.8*   HCT 31.7* 29.7* 29.1* 27.1*   PLT 68* 60* 55* 53*     Iron Saturation:  No components found for: PERCENTFE  FERRITIN:    Lab Results   Component Value Date    FERRITIN 864.6 08/18/2020     IRON:    Lab Results   Component Value Date    IRON 137 08/07/2018     TIBC:    Lab Results   Component Value Date    TIBC see below 08/07/2018       Recent Labs     08/17/20 1712 08/18/20 0157 08/18/20  0546 08/19/20  0542   *  126* 128* 128* 130*   K 3.6  3.6 3.5 3.8 3.9   CL 92*  93* 95* 94* 96*   CO2 22  24 26 23 22   BUN 24*  25* 25* 24* 32*   CREATININE 2.1*  2.1* 1.9* 1.8* 1.8*     Recent Labs     08/17/20 1712 08/18/20  0157 08/18/20  0546 08/19/20  0542   CALCIUM 7.4*  7.8* 7.7* 7.5* 7.8*   MG  --  1.90  --   --    PHOS 2.2*  --   --   --      No results for input(s): PH, PCO2, PO2 in the last 72 hours.     Invalid input(s): Jose David Ramírez    ABG:  No results found for: PH, PCO2, PO2, HCO3, BE, THGB, TCO2, O2SAT  VBG:  No results found for: PHVEN, PVJ3EUU, BEVEN, B1ETQTHT    LDH:    Lab Results   Component Value Date     08/18/2020     Uric Acid:    Lab Results   Component Value Date    LABURIC 8.1 08/17/2020       PT/INR:    Lab Results   Component Value Date    PROTIME 24.6 08/19/2020    INR 2.10 08/19/2020     Warfarin PT/INR:  No components found for: PTPATWAR, PTINRWAR  PTT:    Lab Results   Component Value Date    APTT 50.9 08/19/2020   [APTT}  Last 3 Troponin:    Lab Results   Component Value Date    TROPONINI <0.01 08/17/2020    TROPONINI <0.01 04/18/2019    TROPONINI <0.01 08/06/2018       U/A:    Lab Results   Component Value Date    COLORU Margarita 08/17/2020    PROTEINU TRACE 08/17/2020    PHUR 5.5 08/17/2020    WBCUA 16 08/17/2020    RBCUA 6 08/17/2020    MUCUS 3+ 08/06/2018    TRICHOMONAS Present 08/17/2020    BACTERIA 3+ 08/06/2018    CLARITYU Clear 08/17/2020    SPECGRAV 1.015 08/17/2020    LEUKOCYTESUR SMALL 08/17/2020    UROBILINOGEN >=8.0 08/17/2020    BILIRUBINUR LARGE 08/17/2020    BLOODU MODERATE 08/17/2020    GLUCOSEU 100 08/17/2020    AMORPHOUS 2+ 08/06/2018     Microalbumen/Creatinine ratio:  No components found for: RUCREAT  24 Hour Urine for Protein:  No components found for: RAWUPRO, UHRS3, MHLR99QC, UTV3  24 Hour Urine for Creatinine Clearance:  No components found for: CREAT4, UHRS10, UTV10  Urine Toxicology:  No components found for: IAMMENTA, IBARBIT, IBENZO, ICOCAINE, IMARTHC, IOPIATES, IPHENCYC    HgBA1c:  No results found for: LABA1C  RPR:  No results found for: RPR  HIV:  No results found for: HIV  AZAR:    Lab Results   Component Value Date    AZAR Negative 08/07/2018     RF:  No results found for: RF  DSDNA:  No components found for: DNA  AMYLASE:  No results found for: AMYLASE  LIPASE:    Lab Results   Component Value Date    LIPASE 12.0 08/17/2020     Fibrinogen Level:  No components found for: FIB       BELOW MENTIONED RADIOLOGY STUDY RESULTS BY ME:    Xr Chest Portable    Result Date: 8/17/2020  EXAMINATION: ONE XRAY VIEW OF THE CHEST 8/17/2020 6:23 pm COMPARISON: 04/22/2019 HISTORY: ORDERING SYSTEM PROVIDED HISTORY: general fatigue TECHNOLOGIST PROVIDED HISTORY: Reason for exam:->general fatigue Reason for Exam: vLeg Swelling (Pt c/o pain to bilateral lower extremities with gross amount of swelling. Denies fever.  reports decreased appetite. Hx liver failure. reports not taking prescribed medications because they havent been filled by the pharmacy. Gross abdominal swelling noted as well) Acuity: Unknown Type of Exam: Unknown FINDINGS: Lung volumes are low accentuating heart size and bronchovascular markings at the lung bases. There is left-sided pleural effusion, likely loculated, with adjacent left lung consolidation, increased compared to prior. Volume loss is also seen in the left hemithorax Hazy right basilar opacity is seen. Increasing pleuroparenchymal disease on the left. Ir Us Guided Paracentesis    Result Date: 8/13/2020  PROCEDURE: ULTRASOUND GUIDED PARACENTESIS 8/13/2020 HISTORY: ORDERING SYSTEM PROVIDED HISTORY: Ascites due to alcoholic cirrhosis ([facility] [address] TECHNIQUE: Informed consent was obtained after a detailed explanation of the procedure including risks, benefits, and alternatives. Universal protocol was followed. The right abdomen was prepped and draped in sterile fashion and local anesthesia was achieved with lidocaine. A 5 [employer] Kimberly Yueh needle sheath was advanced under ultrasound guidance into ascites and paracentesis was performed. The patient tolerated the procedure well. FINDINGS: A total of 5.5 L of yellow ascites was removed. Successful ultrasound guided paracentesis. Ir Us Guided Paracentesis    Result Date: 8/6/2020  PROCEDURE: ULTRASOUND GUIDED PARACENTESIS 8/6/2020 HISTORY: ORDERING SYSTEM PROVIDED HISTORY: Ascites due to alcoholic cirrhosis ([facility] [address] TECHNIQUE: Informed consent was obtained after a detailed explanation of the procedure including risks, benefits, and alternatives. Universal protocol was followed. The right abdomen was prepped and draped in sterile fashion and local anesthesia was achieved with lidocaine. An 5 Setswana needle sheath was advanced under ultrasound guidance into ascites and paracentesis was performed. The patient tolerated the procedure well. 5 L removed.  FINDINGS: A total

## 2020-08-19 NOTE — PROGRESS NOTES
Speech Language Pathology  Dysphagia Treatment Note    Name:  Mina Bello  :   1955  Medical Diagnosis:  Sepsis ([facility] [address] [A41.9]  Sepsis ([facility] [address] [A41.9]  Treatment Diagnosis: Oropharyngeal Dysphagia  Pain level: Pt did not report pain    Current Diet Level: Dysphagia II Minced and Moist with thin liquids, meds with puree   Tolerance of Current Diet Level: Pt tolerating current diet with no difficulty per chart review    Assessment of Texture Tolerance:  -Impressions:  Pt positioned upright in bed upon arrival.  Pt willingly accepted trials of thin liquids, soft solids, and regular textures. Thin liquids revealed suspected premature loss of bolus to pharynx and delayed swallow initiation. Intermittent use of multiple swallows assessed suspect due to delayed pharyngeal clearing. Regular textures revealed decreased bolus control, prolonged and excessive mastication, and eventual oral clearance. Pt exhibited improved masticating with soft solids. No overt clinical s/s of aspiration/penetration or change in vocal quality assessed with any texture trials this date. Recommend continue current diet at this time. Diet and Treatment Recommendations:  Dysphagia II Minced and Moist with thin liquids, meds with puree     (Dysphagia Goals addressed, if appropriate)  1.) Pt will tolerate recommended diet without s/s of aspiration (ongoing, 2020)  2.) Pt will tolerate diet advance to least restricted diet, as clinically indicated, with no signs of aspiration (ongoing, 2020)    Plan:  Continued daily Dysphagia treatment3-5x week with goals per plan of care. Patient/Family Education:Education given to the Pt and nurse, who verbalized understanding    Discharge Recommendations:  Pt will benefit from continued skilled Speech Therapy for Dysphagia services, prior to returning home.     Timed Code Treatment: 0 minutes    Total Treatment Time: 10 minutes    If patient discharges prior to next session this note will serve as a discharge summary. Signature:     Kishore GONG  CCC-SLP S.P. [id number]  Speech-Language Pathologist

## 2020-08-19 NOTE — PROGRESS NOTES
Incentive Spirometry education and demonstration completed by Respiratory Therapy No      Response to education: pt refused     Teaching Time: 0 minutes    Minimum Predicted Vital Capacity - 0 mL. Patient's Actual Vital Capacity - 0 mL.  Turning over to Nursing for routine follow-up No.    Comments:     Electronically signed by Bharathi Brice RCP on 8/19/2020 at 3:41 PM

## 2020-08-19 NOTE — PROGRESS NOTES
[facility] GI  Gastroenterology Progress Note    Shiraz Romero is a 59 y.o. male patient. 1. Severe sepsis ([facility]    2. Pneumonia due to organism    3. Acute kidney injury ([facility]        SUBJECTIVE:  C/o pain all over. No N/V. tolerating diet. ROS:  Cardiovascular ROS: no chest pain or dyspnea on exertion  Gastrointestinal ROS: see hpi  Respiratory ROS: no cough, shortness of breath, or wheezing    Physical    VITALS:  /71   Pulse 71   Temp 98.2 °F (36.8 °C) (Oral)   Resp 18   Ht 6' 2\" (1.88 m)   Wt 224 lb 14.4 oz (102 kg)   SpO2 93%   BMI 28.88 kg/m²   TEMPERATURE:  Current - Temp: 98.2 °F (36.8 °C); Max - Temp  Av °F (36.7 °C)  Min: 97.8 °F (36.6 °C)  Max: 98.2 °F (36.8 °C)    NAD  RRR, Nl s1s2  Lungs CTA Bilaterally, normal effort  Abdomen soft, distended, NT, no HSM, Bowel sounds normal  AAOx3, No asterixis   BLE    Data    Data Review:    Recent Labs     20  01520  0546 20  0541   WBC 9.1 7.6 11.8*   HGB 10.1* 10.0* 9.3*   HCT 29.7* 29.1* 27.1*   .3* 110.1* 110.9*   PLT 60* 55* 53*     Recent Labs     20  1712 20  0157 20  0546 20  0542   *  126* 128* 128* 130*   K 3.6  3.6 3.5 3.8 3.9   CL 92*  93* 95* 94* 96*   CO2 22  24 26 23 22   PHOS 2.2*  --   --   --    BUN 24*  25* 25* 24* 32*   CREATININE 2.1*  2.1* 1.9* 1.8* 1.8*     Recent Labs     20  0157 20  0546 20  1556 20  0542   AST 42* 41*  --  34   ALT 20   --  17   BILIDIR  --   --  5.6* 4.4*   BILITOT 10.0* 9.8* 10.5* 9.1*   ALKPHOS 88 78  --  68     Recent Labs     20  1712   LIPASE 12.0*     Recent Labs     20  0157 20  0546 20  0541   PROTIME 19.7* 20.4* 24.6*   INR 1.69* 1.75* 2.10*     No results for input(s): PTT in the last 72 hours. ASSESSMENT / PLAN      Cirrhosis - due to alcohol abuse. No GI bleeding. No prior EGD. No HE. Worsening bili (10 at admission) with stable transaminases.  INR increasing (1.5 -> 1.75 ->2. 1) which is concerning. May all be from worsening liver disease. Bili could be elevated from baseline due to sepsis however alk phos is normal. no gallstones or biliary dilation on US. Ascites - noncompliant with diuretics. Has been getting weekly paracenteses every Thursday. - paracentesis. No more than 5 L removal. Getting albumin per renal.     H/o HCV - RNA level detectable at low level in 2018. KACEY - cr improving  Sepsis  Pneumonia - covid negative. Discussed with Dr. Breann Begum, [place]  [address] [employer]  I have personally performed a face to face diagnostic evaluation on this patient. I have interviewed and examined the patient and I agree with the findings and recommended plan of care. In summary, my findings and plan are the following: As above, still hurts all over, especially below waist.  On exam abd is distended, but some laxity, (+)BS, mild diffuse tender. Will get diagnostic/therapeutic paracentesis today to r/o SBP. Agree with empiric antibiotics, judicious diuresis with Nephrology guidance, and diet as tolerated. I discussed his poor prognosis today with worsening liver function as above. Palliative care and Hospice appropriate if he wishes.     Thomas Marcos MD  [address]  8/19/2020

## 2020-08-19 NOTE — CARE COORDINATION
Discharge Planning Assessment     discharge planner met with patient to discuss reason for admission, current living situation, and potential needs at the time of discharge    Demographics/Insurance verified Yes/No: Yes- Medicare    Current type of dwelling: Lives in a ranch style home. 1 step to enter. Patient from ECF/SW confirmed with: N/A    Living arrangements: Lives alone. Patient states he has a friend that gives him support. Level of function/Support: Patient states that he has been independent with ADLs and IADL until recently. States that he needs more support now. PCP: None    Last Visit to PCP:    DME: None. Patient states that he needs a cane. Active with any community resources/agencies/skilled home care: Patient is not active with any community resources but is agreeable to [facility]. Medication compliance issues: None    Financial issues that could impact healthcare: Patient requesting to see financial counselor. Tentative discharge plan:  Pending PT/OT evaluations. If needed patient is agreeable to [facility]. Patient had no preference for [facility] providers. Will refer to [facility] and to [facility] if DME is recommended. Discussed with patient that on the day of discharge home tentative time of discharge will be between 10 AM and noon. Transportation at the time of discharge: Patient stating that he may need a Lyft. Friend will be on vacation.     Electronically signed by BUNNY Khan on 8/19/2020 at 1:26 PM

## 2020-08-19 NOTE — PROGRESS NOTES
8/19/2020 1432  Last data filed at 8/19/2020 1131  Gross per 24 hour   Intake 620 ml   Output 475 ml   Net 145 ml      Wt Readings from Last 3 Encounters:   08/19/20 224 lb 14.4 oz (102 kg)   08/13/20 229 lb 6.4 oz (104.1 kg)   08/06/20 228 lb (103.4 kg)       General appearance:  Appears comfortable, chronically ill appearing  Eyes: Sclera clear. Pupils equal.  ENT: Moist oral mucosa. Trachea midline, no adenopathy. Bitemporal wasting  Cardiovascular: Regular rhythm, normal S1, S2. No murmur. 4+ BL LE edema  Respiratory: Not using accessory muscles. Good inspiratory effort. Clear to auscultation bilaterally, no wheeze or crackles. GI: Abdomen soft, no tenderness, distended, normal bowel sounds, ascites  Musculoskeletal: No cyanosis in digits, neck supple  Neurology: Grossly intact. No speech or motor deficits  Psych: Normal affect. Alert and oriented in time, place and person  Skin: Warm, dry, normal turgor  Extremity exam shows brisk capillary refill. Peripheral pulses are palpable in lower extremities     Labs and Tests:  CBC:   Recent Labs     08/18/20  0157 08/18/20  0546 08/19/20  0541   WBC 9.1 7.6 11.8*   HGB 10.1* 10.0* 9.3*   PLT 60* 55* 53*     BMP:    Recent Labs     08/18/20  0157 08/18/20  0546 08/19/20  0542   * 128* 130*   K 3.5 3.8 3.9   CL 95* 94* 96*   CO2 26 23 22   BUN 25* 24* 32*   CREATININE 1.9* 1.8* 1.8*   GLUCOSE 143* 138* 177*     Hepatic:   Recent Labs     08/18/20  0157 08/18/20  0546 08/18/20  1556 08/19/20  0542   AST 42* 41*  --  34   ALT 20 18  --  17   BILITOT 10.0* 9.8* 10.5* 9.1*   ALKPHOS 88 78  --  68     IR US GUIDED PARACENTESIS   Final Result   Successful ultrasound guided paracentesis. US GALLBLADDER RUQ   Preliminary Result   Heterogeneous nodular appearance of the liver compatible with known cirrhosis. Gallbladder and common duct appear grossly unremarkable in appearance. Moderate amount of ascites.          XR CHEST PORTABLE   Final Result Increasing pleuroparenchymal disease on the left. Problem List  Principal Problem:    Sepsis ([address]  Active Problems:    Cirrhosis ([address]    Anasarca    HCAP (healthcare-associated pneumonia)    KACEY (acute kidney injury) ([facility]    Anemia    Thrombocytopenia (HCC)    Coagulopathy (HCC)    Alcoholic cirrhosis (HCC)    Hyponatremia  Resolved Problems:    * No resolved hospital problems. *       Assessment & Plan:   1. Cont Cefepime and Vanco for ? HCAP  2. Cont Lasix 40 mg IV bid for anasarca  3. Renal consult for KACEY appreciated  4. GI consult for anasarca/cirrhosis appreciated  5. Changed Morphine IV to Dilaudid IV PRN pain  6. DC droplet plus iso as COVID from 8/18 negative  7. Cont Albumin IV  8. PT/OT pending  9. Cannot exclude SBG  10. Does not follow up as requested as OP  11. Palliative care consult    IV Access: Peripheral  Stack: No  Diet: DIET LOW SODIUM 2 GM; Dysphagia Minced and Moist  Code:Full Code  DVT PPX SCD  Disposition Home with home PT/OT/VNS/HHA    Discussed with patient, CM and nursing. Hopefully home by end of weekend.     Maicol Aden MD   8/19/2020 2:32 PM

## 2020-08-19 NOTE — BRIEF OP NOTE
Brief Postoperative Note    Malena Morales  YOB: 1955  [id number]    Pre-operative Diagnosis: ascites    Post-operative Diagnosis: Same    Procedure: paracentesis    Anesthesia: Local    Surgeons: Elin Ontiveros MD    Estimated Blood Loss: Less than 5 mL    Complications: None    Specimens: Was Obtained: ascitic fluid drained    Findings: Successful US-guided paracentesis.     Electronically signed by Elin Ontiveros MD on 8/19/2020 at 1:51 PM

## 2020-08-19 NOTE — PROGRESS NOTES
Physical Therapy/Occupational Therapy  Justino Lazaro     Hold PT/OT evaluation this afternoon. Pt is going off the floor for paracentesis per nursing. Will follow up tomorrow.      Thanks, Zahida Horta, PT [facility]

## 2020-08-19 NOTE — PROGRESS NOTES
Pt's vital signs stable. Telemetry on. Sinus Arrhythmia on telemetry. Pt complained of upset stomach and requested Tums, notified night shift hospitalist, pt also complaining of generalized pain 8/10. Will continue to monitor.    Vitals:    08/19/20 0155   BP: 100/65   Pulse: 69   Resp: 16   Temp: 97.9 °F (36.6 °C)   SpO2: 93%

## 2020-08-19 NOTE — PROGRESS NOTES
08/19/20 1215   Incentive Spirometry Tx   Incentive Spirometry Goal (mL)   (Patient refused IS at this time)

## 2020-08-20 LAB
A/G RATIO: 0.9 (ref 1.1–2.2)
ALBUMIN SERPL-MCNC: 3 G/DL (ref 3.4–5)
ALP BLD-CCNC: 64 U/L (ref 40–129)
ALT SERPL-CCNC: 16 U/L (ref 10–40)
ANION GAP SERPL CALCULATED.3IONS-SCNC: 11 MMOL/L (ref 3–16)
ANISOCYTOSIS: ABNORMAL
APTT: 52.9 SEC (ref 24.2–36.2)
AST SERPL-CCNC: 33 U/L (ref 15–37)
BASOPHILS ABSOLUTE: 0 K/UL (ref 0–0.2)
BASOPHILS RELATIVE PERCENT: 0 %
BILIRUB SERPL-MCNC: 6.5 MG/DL (ref 0–1)
BILIRUBIN DIRECT: 2.9 MG/DL (ref 0–0.3)
BILIRUBIN, INDIRECT: 3.6 MG/DL (ref 0–1)
BUN BLDV-MCNC: 36 MG/DL (ref 7–20)
CALCIUM SERPL-MCNC: 7.9 MG/DL (ref 8.3–10.6)
CHLORIDE BLD-SCNC: 102 MMOL/L (ref 99–110)
CO2: 21 MMOL/L (ref 21–32)
CREAT SERPL-MCNC: 1.6 MG/DL (ref 0.8–1.3)
D DIMER: 1021 NG/ML DDU (ref 0–229)
EOSINOPHILS ABSOLUTE: 0 K/UL (ref 0–0.6)
EOSINOPHILS RELATIVE PERCENT: 0 %
FIBRINOGEN: 151 MG/DL (ref 200–397)
GFR AFRICAN AMERICAN: 53
GFR NON-AFRICAN AMERICAN: 44
GLOBULIN: 3.4 G/DL
GLUCOSE BLD-MCNC: 141 MG/DL (ref 70–99)
HCT VFR BLD CALC: 26.6 % (ref 40.5–52.5)
HEMATOLOGY PATH CONSULT: NO
HEMOGLOBIN: 9 G/DL (ref 13.5–17.5)
HYPOCHROMIA: ABNORMAL
INR BLD: 2.01 (ref 0.86–1.14)
LYMPHOCYTES ABSOLUTE: 1.6 K/UL (ref 1–5.1)
LYMPHOCYTES RELATIVE PERCENT: 16 %
MACROCYTES: ABNORMAL
MCH RBC QN AUTO: 38.1 PG (ref 26–34)
MCHC RBC AUTO-ENTMCNC: 33.9 G/DL (ref 31–36)
MCV RBC AUTO: 112.2 FL (ref 80–100)
MONOCYTES ABSOLUTE: 1.5 K/UL (ref 0–1.3)
MONOCYTES RELATIVE PERCENT: 15 %
NEUTROPHILS ABSOLUTE: 7 K/UL (ref 1.7–7.7)
NEUTROPHILS RELATIVE PERCENT: 69 %
PDW BLD-RTO: 17.1 % (ref 12.4–15.4)
PLATELET # BLD: 51 K/UL (ref 135–450)
PLATELET SLIDE REVIEW: ABNORMAL
PMV BLD AUTO: 10 FL (ref 5–10.5)
POTASSIUM REFLEX MAGNESIUM: 3.8 MMOL/L (ref 3.5–5.1)
PROTHROMBIN TIME: 23.5 SEC (ref 10–13.2)
RBC # BLD: 2.37 M/UL (ref 4.2–5.9)
SLIDE REVIEW: ABNORMAL
SODIUM BLD-SCNC: 134 MMOL/L (ref 136–145)
TOTAL PROTEIN: 6.4 G/DL (ref 6.4–8.2)
VANCOMYCIN TROUGH: 19.1 UG/ML (ref 10–20)
WBC # BLD: 10.2 K/UL (ref 4–11)

## 2020-08-20 PROCEDURE — 97166 OT EVAL MOD COMPLEX 45 MIN: CPT

## 2020-08-20 PROCEDURE — 36415 COLL VENOUS BLD VENIPUNCTURE: CPT

## 2020-08-20 PROCEDURE — 87205 SMEAR GRAM STAIN: CPT

## 2020-08-20 PROCEDURE — 94761 N-INVAS EAR/PLS OXIMETRY MLT: CPT

## 2020-08-20 PROCEDURE — 87070 CULTURE OTHR SPECIMN AEROBIC: CPT

## 2020-08-20 PROCEDURE — 6360000002 HC RX W HCPCS: Performed by: INTERNAL MEDICINE

## 2020-08-20 PROCEDURE — 97530 THERAPEUTIC ACTIVITIES: CPT

## 2020-08-20 PROCEDURE — 2580000003 HC RX 258: Performed by: HOSPITALIST

## 2020-08-20 PROCEDURE — 80202 ASSAY OF VANCOMYCIN: CPT

## 2020-08-20 PROCEDURE — P9045 ALBUMIN (HUMAN), 5%, 250 ML: HCPCS | Performed by: INTERNAL MEDICINE

## 2020-08-20 PROCEDURE — 85025 COMPLETE CBC W/AUTO DIFF WBC: CPT

## 2020-08-20 PROCEDURE — 85730 THROMBOPLASTIN TIME PARTIAL: CPT

## 2020-08-20 PROCEDURE — 87077 CULTURE AEROBIC IDENTIFY: CPT

## 2020-08-20 PROCEDURE — 92526 ORAL FUNCTION THERAPY: CPT

## 2020-08-20 PROCEDURE — 97535 SELF CARE MNGMENT TRAINING: CPT

## 2020-08-20 PROCEDURE — 85379 FIBRIN DEGRADATION QUANT: CPT

## 2020-08-20 PROCEDURE — 87186 SC STD MICRODIL/AGAR DIL: CPT

## 2020-08-20 PROCEDURE — 2060000000 HC ICU INTERMEDIATE R&B

## 2020-08-20 PROCEDURE — 86403 PARTICLE AGGLUT ANTBDY SCRN: CPT

## 2020-08-20 PROCEDURE — 85610 PROTHROMBIN TIME: CPT

## 2020-08-20 PROCEDURE — 80053 COMPREHEN METABOLIC PANEL: CPT

## 2020-08-20 PROCEDURE — 85384 FIBRINOGEN ACTIVITY: CPT

## 2020-08-20 PROCEDURE — 6370000000 HC RX 637 (ALT 250 FOR IP): Performed by: INTERNAL MEDICINE

## 2020-08-20 PROCEDURE — 97161 PT EVAL LOW COMPLEX 20 MIN: CPT

## 2020-08-20 PROCEDURE — 6360000002 HC RX W HCPCS: Performed by: HOSPITALIST

## 2020-08-20 PROCEDURE — 94640 AIRWAY INHALATION TREATMENT: CPT

## 2020-08-20 RX ORDER — ALBUMIN, HUMAN INJ 5% 5 %
12.5 SOLUTION INTRAVENOUS EVERY 12 HOURS
Status: DISCONTINUED | OUTPATIENT
Start: 2020-08-20 | End: 2020-08-21

## 2020-08-20 RX ADMIN — HYDROMORPHONE HYDROCHLORIDE 1 MG: 1 INJECTION, SOLUTION INTRAMUSCULAR; INTRAVENOUS; SUBCUTANEOUS at 18:14

## 2020-08-20 RX ADMIN — HYDROMORPHONE HYDROCHLORIDE 1 MG: 1 INJECTION, SOLUTION INTRAMUSCULAR; INTRAVENOUS; SUBCUTANEOUS at 22:21

## 2020-08-20 RX ADMIN — Medication 10 ML: at 10:44

## 2020-08-20 RX ADMIN — FUROSEMIDE 40 MG: 10 INJECTION, SOLUTION INTRAMUSCULAR; INTRAVENOUS at 18:14

## 2020-08-20 RX ADMIN — IPRATROPIUM BROMIDE AND ALBUTEROL SULFATE 1 AMPULE: .5; 3 SOLUTION RESPIRATORY (INHALATION) at 14:45

## 2020-08-20 RX ADMIN — HYDROMORPHONE HYDROCHLORIDE 1 MG: 1 INJECTION, SOLUTION INTRAMUSCULAR; INTRAVENOUS; SUBCUTANEOUS at 05:39

## 2020-08-20 RX ADMIN — HYDROMORPHONE HYDROCHLORIDE 1 MG: 1 INJECTION, SOLUTION INTRAMUSCULAR; INTRAVENOUS; SUBCUTANEOUS at 00:03

## 2020-08-20 RX ADMIN — Medication 10 ML: at 08:59

## 2020-08-20 RX ADMIN — Medication 10 ML: at 22:29

## 2020-08-20 RX ADMIN — ALBUMIN (HUMAN) 12.5 G: 12.5 INJECTION, SOLUTION INTRAVENOUS at 00:56

## 2020-08-20 RX ADMIN — FUROSEMIDE 40 MG: 10 INJECTION, SOLUTION INTRAMUSCULAR; INTRAVENOUS at 08:58

## 2020-08-20 RX ADMIN — VANCOMYCIN HYDROCHLORIDE 1500 MG: 10 INJECTION, POWDER, LYOPHILIZED, FOR SOLUTION INTRAVENOUS at 22:23

## 2020-08-20 RX ADMIN — IPRATROPIUM BROMIDE AND ALBUTEROL SULFATE 1 AMPULE: .5; 3 SOLUTION RESPIRATORY (INHALATION) at 08:28

## 2020-08-20 RX ADMIN — Medication 10 ML: at 05:39

## 2020-08-20 RX ADMIN — CEFEPIME HYDROCHLORIDE 2 G: 2 INJECTION, POWDER, FOR SOLUTION INTRAVENOUS at 03:27

## 2020-08-20 RX ADMIN — CEFEPIME HYDROCHLORIDE 2 G: 2 INJECTION, POWDER, FOR SOLUTION INTRAVENOUS at 14:17

## 2020-08-20 RX ADMIN — HYDROMORPHONE HYDROCHLORIDE 1 MG: 1 INJECTION, SOLUTION INTRAMUSCULAR; INTRAVENOUS; SUBCUTANEOUS at 10:43

## 2020-08-20 ASSESSMENT — PAIN DESCRIPTION - DESCRIPTORS
DESCRIPTORS: CONSTANT
DESCRIPTORS: CONSTANT

## 2020-08-20 ASSESSMENT — PAIN DESCRIPTION - LOCATION
LOCATION: GENERALIZED
LOCATION: GENERALIZED

## 2020-08-20 ASSESSMENT — PAIN DESCRIPTION - PROGRESSION: CLINICAL_PROGRESSION: NOT CHANGED

## 2020-08-20 ASSESSMENT — PAIN SCALES - GENERAL
PAINLEVEL_OUTOF10: 10
PAINLEVEL_OUTOF10: 10
PAINLEVEL_OUTOF10: 9
PAINLEVEL_OUTOF10: 10
PAINLEVEL_OUTOF10: 0
PAINLEVEL_OUTOF10: 10
PAINLEVEL_OUTOF10: 10

## 2020-08-20 ASSESSMENT — PAIN DESCRIPTION - FREQUENCY
FREQUENCY: CONTINUOUS
FREQUENCY: CONTINUOUS

## 2020-08-20 ASSESSMENT — PAIN DESCRIPTION - PAIN TYPE
TYPE: ACUTE PAIN
TYPE: ACUTE PAIN

## 2020-08-20 ASSESSMENT — PAIN DESCRIPTION - ONSET
ONSET: ON-GOING
ONSET: ON-GOING

## 2020-08-20 NOTE — PROGRESS NOTES
MD Patricio Villanueva MD Arlean Hinders, MD               Office: [phone]                      Fax: [phone] [facility]                   NEPHROLOGY INPATIENT PROGRESS NOTE:     PATIENT NAME: Shiraz Romero  : 1955  MRN: [id number]       IMPRESSION:       KACEY (on no CKD): Improving to stable now   - still Oligouric   - BL Scr- 0.9  ---> 2.1 on admission  : Etiology of KACEY - presumed pre-renal / ATN w/ hypotensive / hypovolemia / diuretics     - other differentials: ? HRS  W/ Liver cirhosis  - severe as TB - 10 - S.albumin 1.8   + bile induced nephropathy   - UA: large thu, urobili, high chance of bili-induced ATN   - Urine lytes:very lower = pre-renal, likely HR syndrome     Associated problems:   - Azotemia: moderate - better   - Electrolytes: K: WNL - on K supplements  : monitor Mag in am      - Volume status: ?volemic - was hypotensive in ER,   + severe leg edema + anasarca   : d/to lower albumin, liver disease  : unlikely NS  : US ABD:   Heterogeneous nodular appearance of the liver compatible with known cirrhosis. Gallbladder and common duct appear grossly unremarkable in appearance. Moderate amount of ascites. - Weight change: -4.8 oz (-0.136 kg) : down-trending. ..       : Na: Hyponatremia - d/to KACEY + cirrhosis - moderate - follow as better     - Acid-Base: stable ,   : LA elevated     - Anemia: mild        RECOMMENDATIONS:    -Continue aggressive diuresis for next 1-2 days   : Lasix IV 40 mg twice daily   + With albumin IV BID  -Okay with adding low-dose spironolactone  -Add fluid restriction, around 1.2 L,  - keep feng while on IV diuresis     -Agree with empirically treating for SBP  - continue holding home K repletion   - abx for PNA     - no need for dialysis  - at higher risk for worsening needing close monitoring   - overall poor prognosis d/to liver disease       Other problems: Management per primary and other consulting teams. Hospital Problems           Last Modified POA    * (Principal) Sepsis ([facility] 8/17/2020 Yes    Cirrhosis ([facility] 8/17/2020 Yes    Anasarca 8/18/2020 Yes    HCAP (healthcare-associated pneumonia) 8/17/2020 Yes    KACEY (acute kidney injury) ([facility] 8/17/2020 Yes    Anemia 8/18/2020 Yes    Thrombocytopenia ([facility] 8/18/2020 Yes    Coagulopathy ([facility] 4/65/8734 Yes    Alcoholic cirrhosis ([facility] 7/41/3184 Yes    Hyponatremia 8/18/2020 Yes        : other supportive care :   - Check daily renal function panel with electrolytes-phosphorus  - Strict monitoring of I/Os, daily weight  - Renal feeds/diet  - Current medications reviewed. - Nephrotoxic medications have been discontinued. - Dose adjusted and appropriate. - Dose meds for eGFR <15 mL/min/1.73m2 during KACEY    - Avoid heavy opioids due to renal failure - may use very low dose dilaudid / fentanyl with close monitoring of CNS and respiratory depression. Please refer to the orders. High Complexity. Multiple complex problems. Discussed with patient, and treatment team - hospitalist -  [facility] , nursing  Thank you for allowing me to participate in this patient's care. Please do not hesitate to contact me with any questions/concerns. We will follow along with you. Marilyn Velasco MD       [facility] [id number] [place] [address] [place]: [phone] or [address] [employer] [address]  Fax: [phone]        ========================================================   ========================================================        Subjective:   Seen and examined   resting in bed,   Still no SOB   But still w/ major edema    Past medical, Surgical, Social, Family medical history reviewed by me. MEDICATIONS: reviewed by me. Medications Prior to Admission:  No current facility-administered medications on file prior to encounter.       Current Outpatient Medications on File Prior to Encounter   Medication Sig Dispense Refill    furosemide (LASIX) 80 MG 110/69 98.2 °F (36.8 °C) Oral 71 18 96 % --       Intake/Output Summary (Last 24 hours) at 8/20/2020 0914  Last data filed at 8/20/2020 0902  Gross per 24 hour   Intake 980 ml   Output 1950 ml   Net -970 ml          Physical exam:  General: Awake, Alert,   HENT: Atraumatic, normocephalic   Eyes: Normal conjunctiva, Non-incteral sclera. Neck: Supple, JVD not visible. CVS:  Heart sounds are normal. No murmurs. No pericardial rub. RS: Normal respiratory efforts,  Lung fields are clear. Abd: Soft , bowel sounds are normal, no distension and no tenderness to palpation. Skin: No rash ,  bruises,   CNS: Awake Oriented , No focal.   Extremities/MSK: 3+ Edema, no cyanosis. DATA:  Diagnostic tests reviewed by me for today's visit:    Recent Labs     08/17/20 1712 08/18/20 0157 08/18/20  0546 08/19/20  0541 08/20/20  0529   WBC 10.5 9.1 7.6 11.8* 10.2   HCT 31.7* 29.7* 29.1* 27.1* 26.6*   PLT 68* 60* 55* 53* 51*     Iron Saturation:  No components found for: PERCENTFE  FERRITIN:    Lab Results   Component Value Date    FERRITIN 864.6 08/18/2020     IRON:    Lab Results   Component Value Date    IRON 137 08/07/2018     TIBC:    Lab Results   Component Value Date    TIBC see below 08/07/2018       Recent Labs     08/17/20 1712 08/18/20 0157 08/18/20  0546 08/19/20  0542 08/20/20  0529   *  126* 128* 128* 130* 134*   K 3.6  3.6 3.5 3.8 3.9 3.8   CL 92*  93* 95* 94* 96* 102   CO2 22  24 26 23 22 21   BUN 24*  25* 25* 24* 32* 36*   CREATININE 2.1*  2.1* 1.9* 1.8* 1.8* 1.6*     Recent Labs     08/17/20 1712 08/18/20 0157 08/18/20  0546 08/19/20  0542 08/20/20  0529   CALCIUM 7.4*  7.8* 7.7* 7.5* 7.8* 7.9*   MG  --  1.90  --   --   --    PHOS 2.2*  --   --   --   --      No results for input(s): PH, PCO2, PO2 in the last 72 hours.     Invalid input(s): [id number], INSPIREDO2    ABG:  No results found for: PH, PCO2, PO2, HCO3, BE, THGB, TCO2, O2SAT  VBG:  No results found for: Delmy Blair, THE CHEST 8/17/2020 6:23 pm COMPARISON: 04/22/2019 HISTORY: ORDERING SYSTEM PROVIDED HISTORY: general fatigue TECHNOLOGIST PROVIDED HISTORY: Reason for exam:->general fatigue Reason for Exam: vLeg Swelling (Pt c/o pain to bilateral lower extremities with gross amount of swelling. Denies fever. reports decreased appetite. Hx liver failure. reports not taking prescribed medications because they havent been filled by the pharmacy. Gross abdominal swelling noted as well) Acuity: Unknown Type of Exam: Unknown FINDINGS: Lung volumes are low accentuating heart size and bronchovascular markings at the lung bases. There is left-sided pleural effusion, likely loculated, with adjacent left lung consolidation, increased compared to prior. Volume loss is also seen in the left hemithorax Hazy right basilar opacity is seen. Increasing pleuroparenchymal disease on the left. Ir Us Guided Paracentesis    Result Date: 8/13/2020  PROCEDURE: ULTRASOUND GUIDED PARACENTESIS 8/13/2020 HISTORY: ORDERING SYSTEM PROVIDED HISTORY: Ascites due to alcoholic cirrhosis ([address] TECHNIQUE: Informed consent was obtained after a detailed explanation of the procedure including risks, benefits, and alternatives. Universal protocol was followed. The right abdomen was prepped and draped in sterile fashion and local anesthesia was achieved with lidocaine. A 5 [place] [employer] Yueh needle sheath was advanced under ultrasound guidance into ascites and paracentesis was performed. The patient tolerated the procedure well. FINDINGS: A total of 5.5 L of yellow ascites was removed. Successful ultrasound guided paracentesis. Ir Us Guided Paracentesis    Result Date: 8/6/2020  PROCEDURE: ULTRASOUND GUIDED PARACENTESIS 8/6/2020 HISTORY: ORDERING SYSTEM PROVIDED HISTORY: Ascites due to alcoholic cirrhosis ([address] TECHNIQUE: Informed consent was obtained after a detailed explanation of the procedure including risks, benefits, and alternatives.   Universal protocol was followed. The right abdomen was prepped and draped in sterile fashion and local anesthesia was achieved with lidocaine. An 5 [place] needle sheath was advanced under ultrasound guidance into ascites and paracentesis was performed. The patient tolerated the procedure well. 5 L removed. FINDINGS: A total of 5 L clear yellow fluid removed. Was removed. Successful ultrasound guided paracentesis. Ir Us Guided Paracentesis    Result Date: 7/30/2020  PROCEDURE: ULTRASOUND GUIDED PARACENTESIS 7/30/2020 HISTORY: ORDERING SYSTEM PROVIDED HISTORY: Ascites due to alcoholic cirrhosis ([facility] TECHNIQUE: Informed consent was obtained after a detailed explanation of the procedure including risks, benefits, and alternatives. Universal protocol was followed. The right abdomen was prepped and draped in sterile fashion and local anesthesia was achieved with lidocaine. A 5 Latv[place] needle sheath was advanced under ultrasound guidance into ascites and paracentesis was performed. The patient tolerated the procedure well. Estimated blood loss: Less than 5 cc FINDINGS: A total of 5.6 L was removed. Successful ultrasound guided paracentesis.

## 2020-08-20 NOTE — PROGRESS NOTES
Physical Therapy    Facility/Department: [address]  Initial Assessment    NAME: Hugo Marvin  : 1955  MRN: [id number]    Date of Service: 2020    Discharge Recommendations:  Hugo Marvin scored a 18/24 on the AM-PAC short mobility form. Current research shows that an AM-PAC score of 17 or less is typically not associated with a discharge to the patient's home setting. Based on the patient's AM-PAC score and their current functional mobility deficits, it is recommended that the patient have 3-5 sessions per week of Physical Therapy at d/c to increase the patient's independence. Please see assessment section for further patient specific details. If patient discharges prior to next session this note will serve as a discharge summary. Please see below for the latest assessment towards goals. HOME HEALTH CARE: LEVEL 3 SAFETY     - Initial home health evaluation to occur within 24-48 hours, in patient home   - Therapy evaluations in home within 24-48 hours of discharge; including DME and home safety   - Frontload therapy 5 days, then 3x a week   - Therapy to evaluate if patient has [address] needs for personal care   -  evaluation within 24-48 hours, includes evaluation of resources and insurance to determine AL, [place], LTC, and Medicaid options   3-5 sessions per week, S Level 3(3-5 sessions per week, but if declines S3 therapy)   PT Equipment Recommendations  Equipment Needed: Yes(to conserve energy. Not observed using RW, assess in the next session for appropriateness)  Mobility Devices: Susie Simmer: Rolling    Assessment   Body structures, Functions, Activity limitations: Decreased functional mobility ; Decreased endurance;Decreased posture  Assessment: Pt complains of 10/10 pain, and nursing was notified. Nursing replied that pt is in continuous 10/10 pain, but does not exhibit any non verbal or verbal signs or pain during session.  Pt required SBA for rehabilitation services?: Yes  Family / Caregiver Present: No  Diagnosis: sepsis  Follows Commands: Within Functional Limits  General Comment  Comments: Pt found in supine tellez in bed. Subjective  Subjective: Pt reports having 10/10 pain everywhere. Nursing notified about pain. Pain Screening  Patient Currently in Pain: Yes  Vital Signs  Patient Currently in Pain: Yes       Orientation  Orientation  Overall Orientation Status: Within Functional Limits  Social/Functional History  Social/Functional History  Lives With: Alone  Type of Home: House  Home Layout: One level  Home Access: Stairs to enter without rails  Entrance Stairs - Number of Steps: 1 HERMILA  Bathroom Shower/Tub: Tub/Shower unit  Bathroom Toilet: Standard  Bathroom Accessibility: Not accessible(\"might be tight to fit a walker\")  Receives Help From: Friend(s)  ADL Assistance: Independent(does independently, but requires frequent rest breaks; sponge baths because \"can't raise legs to get into tub\")  Homemaking Assistance: Needs assistance(friend delivers food, \"doesn't always know when he'll eat. \" Pt reports 3-4 bites is a good meal.)  Homemaking Responsibilities: Yes(pt attempts to clean house, friend aids)  Ambulation Assistance: Independent(Pt reports being \"out of energy\" with getting in and out of the car, walking to a couch or refrigerator.)  Transfer Assistance: Independent(Pt reports sitting at a spot for 6-8 hours due to lacking energy to transfer/ambulate.)  Active : Yes(reports fatigue during car transfer. No complaints with driving.)  Occupation: Retired  Additional Comments: No falls past 6 mos. Mentioned one fall on Thanksgiving in the bathroom. Reports being at this current level of function for 2 years now. Pt reports having limited energy for tasks. 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 He walks to the kitchen then has to sit for a long time. This takes all evening, he reports.   Cognition   Cognition  Overall Cognitive Status: WFL    Objective     Observation/Palpation  Posture: Fair(leaning forward in the chair and did not attempt to stand upright for transfer. Pt reported that it \"takes him a while to lean back in chair due to his low energy\")    AROM RLE (degrees)  RLE AROM: WFL  AROM LLE (degrees)  LLE AROM : WFL  Strength RLE  Strength RLE: Exception  Comment: Pt expresses fatigue. Pt is able to complete bed mobility and squat pivot transfer with SBA. Strength LLE  Strength LLE: Exception  Comment: Pt expresses fatigue. Pt is able to complete bed mobility and squat pivot transfer with SBA. Sensation  Overall Sensation Status: WFL(Numbness reported in L second ray)  Bed mobility  Supine to Sit: Stand by assistance  Comment: EOB raised  Transfers  Squat Pivot Transfers: Stand by assistance(Pt encouraged to use RW for a stand pivot transfer and in order to conserve energy, but pt refused because the RW was \"too cold. \")  Comment: from sitting EOB to chair, never stood upright  Ambulation  Ambulation?: No(Pt expresses fatigue and \"out of energy\")  Stairs/Curb  Stairs?: No(pt expresses fatigue and \"out of energy\")     Balance  Posture: Fair(not observed. Pt did not upright stand. Forward leaning posture sitting in the chair.)  Sitting - Static: Good  Sitting - Dynamic: Good  Standing - Static: (did not observe. good balance during squat pivot transfer from EOB sitting to sitting in chair)  Standing - Dynamic: (Pt completed out of chair squat/lean (did not attempt to fully stand) for 10 seconds for chair alarm placement with SBA. Pt was steady.)        Plan   Plan  Times per week: 3-5  Times per day: Daily  Current Treatment Recommendations: Stair training, Endurance Training, Balance Training, Functional Mobility Training, Strengthening, Gait Training, Transfer Training  Plan Comment: Assess pt's mobility with RW  Safety Devices  Type of devices:  All fall risk precautions in place, Left in chair, Gait belt, Call light within reach, Patient at risk for falls, Chair alarm in place, Nurse notified  Restraints  Initially in place: No      AM-PAC Score  AM-PAC Inpatient Mobility Raw Score : 18 (08/20/20 1123)  AM-PAC Inpatient T-Scale Score : 43.63 (08/20/20 1123)  Mobility Inpatient CMS 0-100% Score: 46.58 (08/20/20 1123)  Mobility Inpatient CMS G-Code Modifier : CK (08/20/20 1123)          Goals  Short term goals  Time Frame for Short term goals: d/c  Short term goal 1: Pt will perform supervision bed mobility  Short term goal 2: pt will perform stand pivot transfer with LRAD with SBA  Short term goal 3: pt will ambulate 21' with LRAD with CGA  Short term goal 4: Pt will climb one stair with one rail and CGA       Therapy Time   Individual Concurrent Group Co-treatment   Time In 0923         Time Out 1022         Minutes 59         Timed Code Treatment Minutes: [address], SPT  Erin Wolfe, PT    Therapist observed and directed the above evaluation.  Thanks, Sean Pruett, DPT [id number]

## 2020-08-20 NOTE — CARE COORDINATION
SW met with patient regarding his discharge plan. PT recommending [address] and a rolling walker. OT recommending a SNF placement. Patient is agreeable to [address] or SNF. Gave patient a list of SNF near his home that are covered by Medicare. SW will follow-up with patient on tomorrow.     Electronically signed by BUNNY Barnes on 8/20/2020 at 5:13 PM

## 2020-08-20 NOTE — PROGRESS NOTES
Occupational Therapy   Occupational Therapy Initial Assessment  Date: 2020   Patient Name: Mina Bello  MRN: [id number]     : 1955    Date of Service: 2020    Discharge Recommendations:  Mina Bello scored a  on the AM-PAC ADL Inpatient form. Current research shows that an AM-PAC score of 17 or less is typically not associated with a discharge to the patient's home setting. Based on the patient's AM-PAC score and their current ADL deficits, it is recommended that the patient have 3-5 sessions per week of Occupational Therapy at d/c to increase the patient's independence. Please see assessment section for further patient specific details. If patient discharges prior to next session this note will serve as a discharge summary. Please see below for the latest assessment towards goals. OT Equipment Recommendations  Equipment Needed: No  Other: TBD next level of care. Assessment   Performance deficits / Impairments: Decreased functional mobility ; Decreased ADL status; Decreased endurance;Decreased high-level IADLs  Assessment: Pt is not at his baseline level of occupational funciton, based on the above deficits associated with sepsis. Pt would benefit from continued skilled acute OT services to address these deficits. Treatment Diagnosis: Decreased ADL/IADL status, endurance, and functional mobility associated with sepsis  Prognosis: Good;Fair  Decision Making: Medium Complexity  History: Pt 58 yo, lives alone, ranch home, 1 HERMILA. Pt independent ADLs with max extra time, gets assist with IADLs from a friend. Drives, no recent falls. PMH: Liver cirrhosis, smoker  Exam: ROM, MMT, 6 clicks, 4 performance deficits/impairments, evolving presentation  Assistance / Modification: SBA squat pivot transfer, Mod A UB bathing, Min A grooming, needs frequent rest breaks. OT Education: OT Role;Plan of Care;Transfer Training  Patient Education: OT kadeem d/c recommendatoin.  Pt History  Lives With: Alone  Type of Home: House  Home Layout: One level  Home Access: Stairs to enter without rails  Entrance Stairs - Number of Steps: 1 HERMILA  Bathroom Shower/Tub: Tub/Shower unit  Bathroom Toilet: Standard  Bathroom Accessibility: Not accessible(\"might be tight to fit a walker\")  Receives Help From: Friend(s)  ADL Assistance: Independent(does independently, but requires frequent rest breaks; sponge baths because \"can't raise legs to get into tub\")  Homemaking Assistance: Needs assistance(friend delivers food, \"doesn't always know when he'll eat. \" Pt reports 3-4 bites is a good meal.)  Homemaking Responsibilities: Yes(pt attempts to clean house, friend aids)  Ambulation Assistance: Independent(Pt reports being \"out of energy\" with getting in and out of the car, walking to a couch or refrigerator.)  Transfer Assistance: Independent(Pt reports sitting at a spot for 6-8 hours due to lacking energy to transfer/ambulate.)  Active : Yes(reports fatigue during car transfer. No complaints with driving.)  Occupation: Retired  Additional Comments: No falls past 6 mos. Mentioned one fall on Thanksgiving in the bathroom. Reports being at this current level of function for 2 years now. Pt reports having limited energy for tasks. 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 He walks to the kitchen then has to sit for a long time. This takes all evening, he reports. Objective   Vision: Within Functional Limits  Hearing: Within functional limits    Orientation  Overall Orientation Status: Within Normal Limits  Observation/Palpation  Posture: Fair(leaning forward in the chair and did not attempt to stand upright for transfer.  Pt reported that it \"takes him a while to lean back in chair due to his low energy\")  Balance  Sitting Balance: Supervision  Standing Balance: Stand by assistance  Standing Balance  Time: ~5 sec X 2  Activity: squat pivot transfer EOB to chair, partial stand while alarm pad being placed. ADL  Grooming: Minimal assistance(brush teeth. Pt requested OT to apply toothpaste to toothbrush d/t his fatigue.)  UE Bathing: Moderate assistance(Pt bathed UEs with encouragement. OT assisted w/chest and abdomen.)  LE Bathing: Setup(anterior rubia area only, seated)  LE Dressing: Dependent/Total(socks; pt attempted one sock, but catching on his toenails; unable to complete task. Asked OT to assist.)  Additional Comments: Pt transferrred to chair for grooming and UB bathing. Pt with extra time and fatigue, needing lengthy rest breaks with all activity. Tone RUE  RUE Tone: Normotonic  Tone LUE  LUE Tone: Normotonic  Coordination  Movements Are Fluid And Coordinated: Yes     Bed mobility  Supine to Sit: Stand by assistance(HOB elevated)  Scooting: Supervision  Transfers  Sit Pivot Transfers: Stand by assistance  Sit to stand: Stand by assistance(to partial stand)  Stand to sit: Stand by assistance(from partial stand)  Vision - Basic Assessment  Prior Vision: No visual deficits  Visual History: No significant visual history  Patient Visual Report: No visual complaint reported.   Cognition  Overall Cognitive Status: WFL  Perception  Overall Perceptual Status: WFL     Sensation  Overall Sensation Status: WFL(Numbness reported in L second ray)        LUE AROM (degrees)  LUE AROM : WNL  Left Hand AROM (degrees)  Left Hand AROM: WNL  RUE AROM (degrees)  RUE AROM : WNL  Right Hand AROM (degrees)  Right Hand AROM: WNL  LUE Strength  Gross LUE Strength: WFL;WNL(5/5 elbow, shoulder)  L Hand General: 5/5  RUE Strength  Gross RUE Strength: WFL(5/5 elbow, shoulder)  R Hand General: 5/5                   Plan   Plan  Times per week: 3-5  Times per day: Daily  Current Treatment Recommendations: Safety Education & Training, Self-Care / ADL, Endurance Training, Functional Mobility Training, Patient/Caregiver Education & Training    AM-PAC Score        AM-PAC Inpatient Daily Activity Raw Score: 11 (08/20/20 1320)  AM-PAC Inpatient ADL T-Scale Score : 29.04 (08/20/20 1320)  ADL Inpatient CMS 0-100% Score: 70.42 (08/20/20 1320)  ADL Inpatient CMS G-Code Modifier : CL (08/20/20 1320)    Goals  Short term goals  Time Frame for Short term goals: Discharge  Short term goal 1: Supervision for functional transfers to ADL surfaces w/AD as needed  Short term goal 2: Supervision for functional mobility for ADL activity w/AD as needed  Short term goal 3: S/U for UB bathing/dressing with rest breaks as needed  Short term goal 4: Min A for LB bathing/dressing with rest breaks as needed  Short term goal 5: S/U for grooming       Therapy Time   Individual Concurrent Group Co-treatment   Time In 0923         Time Out 1023         Minutes 60               Timed Code Treatment Minutes:  45    Total Treatment Minutes:  [address] [facility] [address], [place] [address], OTR/L, [id number]

## 2020-08-20 NOTE — PROGRESS NOTES
Speech Language Pathology  Dysphagia Treatment Note    Name:  Valentin Garcia  :   1955  Medical Diagnosis:  Sepsis ([facility] [address] [A41.9]  Sepsis ([facility] [address] [A41.9]  Treatment Diagnosis: Oropharyngeal Dysphagia  Pain level: Pt did not report pain    Current Diet Level: Dysphagia II Minced and Moist with thin liquids, meds with puree   Tolerance of Current Diet Level: Pt tolerating current diet with no difficulty per chart review    Assessment of Texture Tolerance:  -Impressions:  Pt positioned upright in recliner chair upon arrival with friend present in room. Pt reported no recent difficulty with current diet level but reduced appetite. Pt willingly accepted trials of thin liquids and regular textures this date. Pt presented with suspected premature bolus loss following po trials of thin liquids and x1 delayed throat clear suspect delayed pharyngeal clearing, no vocal changes noted. Pt with prolonged but effective mastication with regular solid trials but successful oral clearing following the swallow. No overt s/s of penetration/ aspiration noted with any texture trials this date. Recommend diet upgrade to Dysphagia III Soft and Bite Sized diet with thin liquids, meds with puree. Diet and Treatment Recommendations:  Dysphagia III Soft and Bite Sized with thin liquids, meds with puree     (Dysphagia Goals addressed, if appropriate)  1.) Pt will tolerate recommended diet without s/s of aspiration (ongoing, 2020)  2.) Pt will tolerate diet advance to least restricted diet, as clinically indicated, with no signs of aspiration (ongoing, 2020)    Plan:  Continued daily Dysphagia treatment3-5x week with goals per plan of care. Patient/Family Education: Education given to the Pt and nurse, who verbalized understanding    Discharge Recommendations:  Pt will benefit from continued skilled Speech Therapy for Dysphagia services, prior to returning home.     Timed Code Treatment: 0 minutes    Total Treatment Time: 15 minutes    If patient discharges prior to next session this note will serve as a discharge summary. Signature:   Robni Guzman, [address] [place] [address] [facility]SLP #[id number]  Speech Language Pathologist     [facility]  Speech-Language Pathology Student    The speech-language pathologist was present, directed the patient's care, made skilled judgment and was responsible for assessment and treatment.

## 2020-08-20 NOTE — PROGRESS NOTES
[facility] GI  Gastroenterology Progress Note    Shiraz Romero is a 59 y.o. male patient. 1. Severe sepsis ([facility] [address]    2. Pneumonia due to organism    3. Acute kidney injury ([facility] [address]        SUBJECTIVE:  C/o pain from his feet, to the scrotum, to the flanks and abdomen. No N/V. tolerating diet. ROS:  Cardiovascular ROS: no chest pain or dyspnea on exertion  Gastrointestinal ROS: see hpi  Respiratory ROS: no cough, shortness of breath, or wheezing    Physical    VITALS:  /66   Pulse 67   Temp 98.3 °F (36.8 °C) (Oral)   Resp 16   Ht 6' 2\" (1.88 m)   Wt 224 lb 14.4 oz (102 kg)   SpO2 95%   BMI 28.88 kg/m²   TEMPERATURE:  Current - Temp: 98.3 °F (36.8 °C); Max - Temp  Av.2 °F (36.8 °C)  Min: 97.9 °F (36.6 °C)  Max: 98.4 °F (36.9 °C)    NAD  RRR, Nl s1s2  Lungs CTA Bilaterally, normal effort  Abdomen soft, mild distention, NT, no HSM, Bowel sounds normal  AAOx3, No asterixis   Pitting edema BLE to thighs and flank    Data    Data Review:    Recent Labs     20  0546 20  0541 20  0529   WBC 7.6 11.8* 10.2   HGB 10.0* 9.3* 9.0*   HCT 29.1* 27.1* 26.6*   .1* 110.9* 112.2*   PLT 55* 53* 51*     Recent Labs     20  1712  20  0546 20  0542 20  0529   *  126*   < > 128* 130* 134*   K 3.6  3.6   < > 3.8 3.9 3.8   CL 92*  93*   < > 94* 96* 102   CO2 22  24   < > 23 22 21   PHOS 2.2*  --   --   --   --    BUN 24*  25*   < > 24* 32* 36*   CREATININE 2.1*  2.1*   < > 1.8* 1.8* 1.6*    < > = values in this interval not displayed. Recent Labs     20  0546 20  1556 20  0542 20  0529   AST 41*  --  34 33   ALT 18  --  17 16   BILIDIR  --  5.6* 4.4* 2.9*   BILITOT 9.8* 10.5* 9.1* 6.5*   ALKPHOS 78  --  68 64     Recent Labs     20  1712   LIPASE 12.0*     Recent Labs     20  0546 20  0541 20  0529   PROTIME 20.4* 24.6* 23.5*   INR 1.75* 2.10* 2.01*     No results for input(s): PTT in the last 72 hours. ASSESSMENT / PLAN      Cirrhosis - due to alcohol abuse. No GI bleeding. No prior EGD. No HE. Worsening bili (10 at admission) with stable transaminases. Bili now improving. INR increased initially but improved some today(1.5 -> 1.75 ->2.1->2.0). May be from worsening liver disease. Bili could be elevated from baseline due to sepsis however alk phos is normal. no gallstones or biliary dilation on US. Ascites, BLE swelling - the \"pain all over\" is from anasarca. noncompliant with diuretics. Has been getting weekly paracenteses every Thursday. S/p para with 3.5 L fluid removed 8/19. Neutrophil count was 228 which is on the higher side but not over 250 which is needed to diagnose neutrocytic ascites. He was on Cefepime prior to para so it is possible he could have had SBP which was partially treated. Cx pending. SAAG elevated at 2.7 c/w portal HTN. Weight down 12 pounds. - f/u ascitic fluid cx  - diuretics per nephrology  - 2 gram low sodium diet  - financial to see regarding assistance with home medications - lasix and aldactone    H/o HCV - RNA level detectable at low level in 2018. KACEY - cr improving  Sepsis  Pneumonia - covid negative. Discussed with Dr. Radha Brewer, PA-C  [facility]  I have personally performed a face to face diagnostic evaluation on this patient. I have interviewed and examined the patient and I agree with the findings and recommended plan of care. In summary, my findings and plan are the following: As above, numbers/labs improved, but still c/o pain from waist down. Losing weight. Would add spironolactone to diuretic regimen. Will ask Hospitalist when Stack can be discontinued. Agree with Abx to treat presumed SBP.     Sheldon Pugh MD  [address]  8/20/2020

## 2020-08-21 LAB
A/G RATIO: 0.9 (ref 1.1–2.2)
ALBUMIN SERPL-MCNC: 2.8 G/DL (ref 3.4–5)
ALP BLD-CCNC: 66 U/L (ref 40–129)
ALT SERPL-CCNC: 19 U/L (ref 10–40)
ANION GAP SERPL CALCULATED.3IONS-SCNC: 10 MMOL/L (ref 3–16)
ANISOCYTOSIS: ABNORMAL
APTT: 48.4 SEC (ref 24.2–36.2)
AST SERPL-CCNC: 36 U/L (ref 15–37)
BANDED NEUTROPHILS RELATIVE PERCENT: 2 % (ref 0–7)
BASOPHILS ABSOLUTE: 0 K/UL (ref 0–0.2)
BASOPHILS RELATIVE PERCENT: 0 %
BILIRUB SERPL-MCNC: 5.1 MG/DL (ref 0–1)
BILIRUBIN DIRECT: 2.4 MG/DL (ref 0–0.3)
BILIRUBIN, INDIRECT: 2.7 MG/DL (ref 0–1)
BLOOD CULTURE, ROUTINE: NORMAL
BUN BLDV-MCNC: 34 MG/DL (ref 7–20)
CALCIUM SERPL-MCNC: 7.5 MG/DL (ref 8.3–10.6)
CHLORIDE BLD-SCNC: 99 MMOL/L (ref 99–110)
CO2: 24 MMOL/L (ref 21–32)
CREAT SERPL-MCNC: 1.6 MG/DL (ref 0.8–1.3)
CULTURE, BLOOD 2: NORMAL
D DIMER: 1885 NG/ML DDU (ref 0–229)
EOSINOPHILS ABSOLUTE: 0.1 K/UL (ref 0–0.6)
EOSINOPHILS RELATIVE PERCENT: 1 %
FIBRINOGEN: 151 MG/DL (ref 200–397)
GFR AFRICAN AMERICAN: 53
GFR NON-AFRICAN AMERICAN: 44
GLOBULIN: 3.2 G/DL
GLUCOSE BLD-MCNC: 154 MG/DL (ref 70–99)
HCT VFR BLD CALC: 26 % (ref 40.5–52.5)
HEMATOLOGY PATH CONSULT: NO
HEMOGLOBIN: 8.9 G/DL (ref 13.5–17.5)
HYPOCHROMIA: ABNORMAL
INR BLD: 1.91 (ref 0.86–1.14)
L. PNEUMOPHILA SEROGP 1 UR AG: NORMAL
LYMPHOCYTES ABSOLUTE: 1.3 K/UL (ref 1–5.1)
LYMPHOCYTES RELATIVE PERCENT: 14 %
MACROCYTES: ABNORMAL
MAGNESIUM: 1.6 MG/DL (ref 1.8–2.4)
MCH RBC QN AUTO: 37.7 PG (ref 26–34)
MCHC RBC AUTO-ENTMCNC: 34.2 G/DL (ref 31–36)
MCV RBC AUTO: 110 FL (ref 80–100)
MONOCYTES ABSOLUTE: 1.4 K/UL (ref 0–1.3)
MONOCYTES RELATIVE PERCENT: 15 %
NEUTROPHILS ABSOLUTE: 6.7 K/UL (ref 1.7–7.7)
NEUTROPHILS RELATIVE PERCENT: 68 %
PDW BLD-RTO: 16.8 % (ref 12.4–15.4)
PLATELET # BLD: 56 K/UL (ref 135–450)
PLATELET SLIDE REVIEW: ABNORMAL
PMV BLD AUTO: 9.3 FL (ref 5–10.5)
POLYCHROMASIA: ABNORMAL
POTASSIUM REFLEX MAGNESIUM: 3.4 MMOL/L (ref 3.5–5.1)
PROTHROMBIN TIME: 22.3 SEC (ref 10–13.2)
RBC # BLD: 2.37 M/UL (ref 4.2–5.9)
SLIDE REVIEW: ABNORMAL
SODIUM BLD-SCNC: 133 MMOL/L (ref 136–145)
TEAR DROP CELLS: ABNORMAL
TOTAL PROTEIN: 6 G/DL (ref 6.4–8.2)
WBC # BLD: 9.5 K/UL (ref 4–11)

## 2020-08-21 PROCEDURE — 6370000000 HC RX 637 (ALT 250 FOR IP): Performed by: HOSPITALIST

## 2020-08-21 PROCEDURE — 6370000000 HC RX 637 (ALT 250 FOR IP): Performed by: INTERNAL MEDICINE

## 2020-08-21 PROCEDURE — 83735 ASSAY OF MAGNESIUM: CPT

## 2020-08-21 PROCEDURE — 85384 FIBRINOGEN ACTIVITY: CPT

## 2020-08-21 PROCEDURE — 6360000002 HC RX W HCPCS: Performed by: INTERNAL MEDICINE

## 2020-08-21 PROCEDURE — 85730 THROMBOPLASTIN TIME PARTIAL: CPT

## 2020-08-21 PROCEDURE — 94640 AIRWAY INHALATION TREATMENT: CPT

## 2020-08-21 PROCEDURE — 85610 PROTHROMBIN TIME: CPT

## 2020-08-21 PROCEDURE — 80053 COMPREHEN METABOLIC PANEL: CPT

## 2020-08-21 PROCEDURE — 6360000002 HC RX W HCPCS: Performed by: HOSPITALIST

## 2020-08-21 PROCEDURE — P9045 ALBUMIN (HUMAN), 5%, 250 ML: HCPCS | Performed by: INTERNAL MEDICINE

## 2020-08-21 PROCEDURE — 2580000003 HC RX 258: Performed by: HOSPITALIST

## 2020-08-21 PROCEDURE — 92526 ORAL FUNCTION THERAPY: CPT

## 2020-08-21 PROCEDURE — 85025 COMPLETE CBC W/AUTO DIFF WBC: CPT

## 2020-08-21 PROCEDURE — 85379 FIBRIN DEGRADATION QUANT: CPT

## 2020-08-21 PROCEDURE — 94761 N-INVAS EAR/PLS OXIMETRY MLT: CPT

## 2020-08-21 PROCEDURE — 2060000000 HC ICU INTERMEDIATE R&B

## 2020-08-21 RX ORDER — FUROSEMIDE 40 MG/1
40 TABLET ORAL 2 TIMES DAILY
Status: DISCONTINUED | OUTPATIENT
Start: 2020-08-22 | End: 2020-08-25

## 2020-08-21 RX ORDER — POTASSIUM CHLORIDE 7.45 MG/ML
10 INJECTION INTRAVENOUS PRN
Status: DISCONTINUED | OUTPATIENT
Start: 2020-08-21 | End: 2020-08-29 | Stop reason: HOSPADM

## 2020-08-21 RX ORDER — SPIRONOLACTONE 25 MG/1
25 TABLET ORAL DAILY
Status: DISCONTINUED | OUTPATIENT
Start: 2020-08-21 | End: 2020-08-26

## 2020-08-21 RX ORDER — FUROSEMIDE 10 MG/ML
40 INJECTION INTRAMUSCULAR; INTRAVENOUS 2 TIMES DAILY
Status: COMPLETED | OUTPATIENT
Start: 2020-08-21 | End: 2020-08-21

## 2020-08-21 RX ORDER — POTASSIUM CHLORIDE 20 MEQ/1
40 TABLET, EXTENDED RELEASE ORAL PRN
Status: DISCONTINUED | OUTPATIENT
Start: 2020-08-21 | End: 2020-08-29 | Stop reason: HOSPADM

## 2020-08-21 RX ORDER — MAGNESIUM SULFATE IN WATER 40 MG/ML
2 INJECTION, SOLUTION INTRAVENOUS ONCE
Status: COMPLETED | OUTPATIENT
Start: 2020-08-21 | End: 2020-08-21

## 2020-08-21 RX ADMIN — HYDROMORPHONE HYDROCHLORIDE 1 MG: 1 INJECTION, SOLUTION INTRAMUSCULAR; INTRAVENOUS; SUBCUTANEOUS at 12:31

## 2020-08-21 RX ADMIN — HYDROMORPHONE HYDROCHLORIDE 1 MG: 1 INJECTION, SOLUTION INTRAMUSCULAR; INTRAVENOUS; SUBCUTANEOUS at 20:44

## 2020-08-21 RX ADMIN — HYDROMORPHONE HYDROCHLORIDE 1 MG: 1 INJECTION, SOLUTION INTRAMUSCULAR; INTRAVENOUS; SUBCUTANEOUS at 16:44

## 2020-08-21 RX ADMIN — Medication 10 ML: at 12:31

## 2020-08-21 RX ADMIN — MAGNESIUM SULFATE HEPTAHYDRATE 2 G: 40 INJECTION, SOLUTION INTRAVENOUS at 13:14

## 2020-08-21 RX ADMIN — FUROSEMIDE 40 MG: 10 INJECTION, SOLUTION INTRAMUSCULAR; INTRAVENOUS at 18:44

## 2020-08-21 RX ADMIN — POTASSIUM CHLORIDE 40 MEQ: 1500 TABLET, EXTENDED RELEASE ORAL at 13:14

## 2020-08-21 RX ADMIN — IPRATROPIUM BROMIDE AND ALBUTEROL SULFATE 1 AMPULE: .5; 3 SOLUTION RESPIRATORY (INHALATION) at 12:32

## 2020-08-21 RX ADMIN — CEFEPIME HYDROCHLORIDE 2 G: 2 INJECTION, POWDER, FOR SOLUTION INTRAVENOUS at 16:18

## 2020-08-21 RX ADMIN — VANCOMYCIN HYDROCHLORIDE 1250 MG: 10 INJECTION, POWDER, LYOPHILIZED, FOR SOLUTION INTRAVENOUS at 21:03

## 2020-08-21 RX ADMIN — SPIRONOLACTONE 25 MG: 25 TABLET ORAL at 16:18

## 2020-08-21 RX ADMIN — IPRATROPIUM BROMIDE AND ALBUTEROL SULFATE 1 AMPULE: .5; 3 SOLUTION RESPIRATORY (INHALATION) at 16:27

## 2020-08-21 RX ADMIN — Medication 10 ML: at 20:33

## 2020-08-21 RX ADMIN — Medication 10 ML: at 09:20

## 2020-08-21 RX ADMIN — FUROSEMIDE 40 MG: 10 INJECTION, SOLUTION INTRAMUSCULAR; INTRAVENOUS at 08:21

## 2020-08-21 RX ADMIN — ALBUMIN (HUMAN) 12.5 G: 12.5 INJECTION, SOLUTION INTRAVENOUS at 09:05

## 2020-08-21 RX ADMIN — IPRATROPIUM BROMIDE AND ALBUTEROL SULFATE 1 AMPULE: .5; 3 SOLUTION RESPIRATORY (INHALATION) at 08:32

## 2020-08-21 RX ADMIN — HYDROMORPHONE HYDROCHLORIDE 1 MG: 1 INJECTION, SOLUTION INTRAMUSCULAR; INTRAVENOUS; SUBCUTANEOUS at 02:12

## 2020-08-21 RX ADMIN — CEFEPIME HYDROCHLORIDE 2 G: 2 INJECTION, POWDER, FOR SOLUTION INTRAVENOUS at 02:12

## 2020-08-21 RX ADMIN — HYDROMORPHONE HYDROCHLORIDE 1 MG: 1 INJECTION, SOLUTION INTRAMUSCULAR; INTRAVENOUS; SUBCUTANEOUS at 08:21

## 2020-08-21 ASSESSMENT — PAIN DESCRIPTION - FREQUENCY: FREQUENCY: CONTINUOUS

## 2020-08-21 ASSESSMENT — PAIN DESCRIPTION - PROGRESSION
CLINICAL_PROGRESSION: NOT CHANGED

## 2020-08-21 ASSESSMENT — PAIN SCALES - GENERAL
PAINLEVEL_OUTOF10: 10
PAINLEVEL_OUTOF10: 9
PAINLEVEL_OUTOF10: 10

## 2020-08-21 ASSESSMENT — PAIN DESCRIPTION - DESCRIPTORS: DESCRIPTORS: CONSTANT

## 2020-08-21 ASSESSMENT — PAIN DESCRIPTION - LOCATION
LOCATION: GENERALIZED
LOCATION: GENERALIZED

## 2020-08-21 ASSESSMENT — PAIN DESCRIPTION - ONSET: ONSET: ON-GOING

## 2020-08-21 ASSESSMENT — PAIN - FUNCTIONAL ASSESSMENT: PAIN_FUNCTIONAL_ASSESSMENT: PREVENTS OR INTERFERES SOME ACTIVE ACTIVITIES AND ADLS

## 2020-08-21 ASSESSMENT — PAIN DESCRIPTION - PAIN TYPE: TYPE: ACUTE PAIN;CHRONIC PAIN

## 2020-08-21 NOTE — PROGRESS NOTES
Assessment complete. VSS no SOB or any distress noted. Pain medication given as ordered. Vancomycin started. Assisted pt back to bed. Stack emptied. Call light within reach, pt encouraged to call if any needs arise. No further requests at this time. Will continue to monitor.

## 2020-08-21 NOTE — PROGRESS NOTES
[facility] GI  Gastroenterology Progress Note    Mary Up is a 59 y.o. male patient. 1. Severe sepsis ([facility] [address]    2. Pneumonia due to organism    3. Acute kidney injury ([facility] [address]        SUBJECTIVE:  Pain from waistline and below is stable. No N/V. tolerating diet. ROS:  Cardiovascular ROS: no chest pain or dyspnea on exertion  Gastrointestinal ROS: see hpi  Respiratory ROS: no cough, shortness of breath, or wheezing    Physical    VITALS:  /63   Pulse 74   Temp 98.1 °F (36.7 °C) (Oral)   Resp 16   Ht 6' 2\" (1.88 m)   Wt 194 lb 3.2 oz (88.1 kg)   SpO2 95%   BMI 24.93 kg/m²   TEMPERATURE:  Current - Temp: 98.1 °F (36.7 °C); Max - Temp  Av.8 °F (36.6 °C)  Min: 97.3 °F (36.3 °C)  Max: 98.5 °F (36.9 °C)    NAD  RRR, Nl s1s2  Lungs CTA Bilaterally, normal effort  Abdomen soft, mild distention, NT, no HSM, Bowel sounds normal  AAOx3, No asterixis   Pitting edema BLE to thighs and flank    Data    Data Review:    Recent Labs     20  0541 20  0529 20  0506   WBC 11.8* 10.2 9.5   HGB 9.3* 9.0* 8.9*   HCT 27.1* 26.6* 26.0*   .9* 112.2* 110.0*   PLT 53* 51* 56*     Recent Labs     20  0542 20  0529 20  0506   * 134* 133*   K 3.9 3.8 3.4*   CL 96* 102 99   CO2 22 21 24   BUN 32* 36* 34*   CREATININE 1.8* 1.6* 1.6*     Recent Labs     20  0542 20  0529 20  0506   AST 34 33 36   ALT 17 16 19   BILIDIR 4.4* 2.9* 2.4*   BILITOT 9.1* 6.5* 5.1*   ALKPHOS 68 64 66     No results for input(s): LIPASE, AMYLASE in the last 72 hours. Recent Labs     20  0541 20  0529 20  0506   PROTIME 24.6* 23.5* 22.3*   INR 2.10* 2.01* 1.91*     No results for input(s): PTT in the last 72 hours. ASSESSMENT / PLAN      Cirrhosis - due to alcohol abuse. No GI bleeding. No prior EGD. No HE. Worsening bili (10 at admission) with stable transaminases. Bili now improving. INR increased initially but now improving.  Bili could be elevated from baseline due to sepsis however alk phos is normal. no gallstones or biliary dilation on US. Ascites, BLE swelling - the \"pain all over\" is from anasarca. noncompliant with diuretics as he cannot afford them. Has been getting weekly paracenteses every Thursday. S/p para with 3.5 L fluid removed 8/19. Neutrophil count was 228 which is on the higher side but not over 250 which is needed to diagnose neutrocytic ascites. He was on Cefepime prior to para so it is possible he could have had SBP which was partially treated. Cx pending. SAAG elevated at 2.7 c/w portal HTN. Weight down 12 pounds. - f/u ascitic fluid cx - NGTD  - would continue antibiotics for presumed SBP  - on lasix per nephrology. - 2 gram low sodium diet  - will ask pharmacy to see what expected cost of lasix and aldactone would be. Per financial, can give 30 day supply. H/o HCV - RNA level detectable at low level in 2018. KACEY - cr down to 1.6  Sepsis  Pneumonia - covid negative. Discussed with Dr. Cindy Odom, PA-C  [facility]  I have personally performed a face to face diagnostic evaluation on this patient. I have interviewed and examined the patient and I agree with the findings and recommended plan of care. In summary, my findings and plan are the following: As above, hepatic function slowly improving. Creat stable at 1.6. Will add low dose spironolactone to regimen. Still aching all over from edema.       Michael Zaldivar MD  [address]  8/21/2020

## 2020-08-21 NOTE — PROGRESS NOTES
Clinical Pharmacy Note: Pharmacy to Dose Vancomcyin    Vancomycin Day: 5  Current Dosin mg Q24H      Recent Labs     20  0529 20  0506   BUN 36* 34*       Recent Labs     20  0529 20  0506   CREATININE 1.6* 1.6*       Recent Labs     20  0529 20  0506   WBC 10.2 9.5         Intake/Output Summary (Last 24 hours) at 2020 4673  Last data filed at 2020 2200  Gross per 24 hour   Intake 330 ml   Output 350 ml   Net -20 ml         Ht Readings from Last 1 Encounters:   20 6' 2\" (1.88 m)        Wt Readings from Last 1 Encounters:   20 194 lb 3.2 oz (88.1 kg)         Body mass index is 24.93 kg/m². Estimated Creatinine Clearance: 54 mL/min (A) (based on SCr of 1.6 mg/dL (H)). Trough/Random: 19.1    Assessment/Plan:   Vancomycin level is therapeutic. Level was drawn appropriately in respect to last dose given.  Will reduce vancomycin to 1250 mg IV Q24H to prevent accumulation.  A vancomycin trough has been ordered for  @ 2100.  Changes in regimen will be determined based on culture results, renal function, and clinical response. [address] [place] [address] will continue to monitor and adjust regimen as necessary.     Thank you for the consult,    Shea Clifford, PharmD  PGY1 Pharmacy Resident  [id number]    2020

## 2020-08-21 NOTE — PROGRESS NOTES
Pharmacy to check patient copays:    Pharmacy consulted to check copay costs for Lasix and Spironolactone. Patient does not have medical insurance or drug insurance. Medications will need to be paid for out of pocket. Out of pocket price for Lasix tablets is: $11.98/month  Out of pocket price for Spironolactone tablets is: $11.98/month    Rx Coupons are available online and will discount both medications to $6-7/month for a 30 day supply if filled at [address] [facility] or [employer].      Joana Funk, PharmD  Clinical Pharmacist [id number]  8/21/2020

## 2020-08-21 NOTE — PROGRESS NOTES
330 ml   Output 350 ml   Net -20 ml      Wt Readings from Last 3 Encounters:   08/20/20 194 lb 3.2 oz (88.1 kg)   08/13/20 229 lb 6.4 oz (104.1 kg)   08/06/20 228 lb (103.4 kg)       General appearance: -American gentleman, appears comfortable  Eyes: Sclera clear. Pupils equal.  ENT: Moist oral mucosa. Trachea midline, no adenopathy, neck supple. Cardiovascular: Regular rhythm, normal S1, S2. No murmur. No edema in lower extremities  Respiratory: Not using accessory muscles. Good inspiratory effort. Clear to auscultation bilaterally, no wheeze or crackles. GI: Abdomen soft, ascites noted, tympanic to percussion, no tenderness, not distended, normal bowel sounds  Musculoskeletal: No deformity, ROM WNL. Neurology: CN 2-12 grossly intact. No speech or motor deficits  Psych: Normal affect. Alert and oriented in time, place and person  Skin: Warm, dry, normal turgor  Extremities: Bilateral pitting edema noted edema is hard like anasarca up to thighs  Labs and Tests:  CBC:   Recent Labs     08/19/20  0541 08/20/20  0529 08/21/20  0506   WBC 11.8* 10.2 9.5   HGB 9.3* 9.0* 8.9*   HCT 27.1* 26.6* 26.0*   .9* 112.2* 110.0*   PLT 53* 51* 56*     BMP:    Recent Labs     08/19/20  0542 08/20/20  0529 08/21/20  0506   * 134* 133*   K 3.9 3.8 3.4*   CL 96* 102 99   CO2 22 21 24   BUN 32* 36* 34*   CREATININE 1.8* 1.6* 1.6*   GLUCOSE 177* 141* 154*     Hepatic:   Recent Labs     08/19/20  0542 08/20/20  0529 08/21/20  0506   AST 34 33 36   ALT 17 16 19   BILITOT 9.1* 6.5* 5.1*   ALKPHOS 68 64 66         Problem List  Principal Problem:    Sepsis ([address]  Active Problems:    Cirrhosis ([facility] [address]    Anasarca    HCAP (healthcare-associated pneumonia)    KACEY (acute kidney injury) ([address]    Anemia    Thrombocytopenia (HCC)    Coagulopathy (HCC)    Alcoholic cirrhosis (HCC)    Hyponatremia  Resolved Problems:    * No resolved hospital problems. *       Assessment & Plan:     1.   Alcoholic cirrhosis: Gastroenterology service following, awaiting final recommendations for gastroenterology, Aldactone added  2. Ascites status post paracentesis on 8/19 3.7 L fluid removed, continue Lasix 40 mg twice daily for anasarca  3. Continue empiric antibiotic treatment with cefepime vancomycin for possible H CAP   4. Acute kidney injury on chronic kidney disease, creatinine 1.6 today stable, nephrology following  5. PT OT evaluation done, home health care versus skilled nursing care facility placement. Diet: DIET LOW SODIUM 2 GM;  Dysphagia Soft and Bite-Sized; 1200 ml  Code:Full Code  DVT PPX lovenox       Sarai Dyer MD   8/21/2020 7:55 AM

## 2020-08-21 NOTE — PROGRESS NOTES
MD Lester Rangel MD Dail Callas, MD               Office: [phone]                      Fax: [phone]             [address] [place] [address]                   NEPHROLOGY INPATIENT PROGRESS NOTE:     PATIENT NAME: Yaron Estrada  : 1955  MRN: [id number]       IMPRESSION:       KACEY (on no CKD): Improving to stable now   - still Oligouric   - BL Scr- 0.9  ---> 2.1 on admission  : Etiology of KACEY - presumed pre-renal / ATN w/ hypotensive / hypovolemia / diuretics     - other differentials: ? HRS  W/ Liver cirhosis  - severe as TB - 10 - S.albumin 1.8   + bile induced nephropathy   - UA: large thu, urobili, high chance of bili-induced ATN   - Urine lytes:very lower = pre-renal, likely HR syndrome     Associated problems:   - Azotemia: moderate - better   - Electrolytes: K: WNL - on K supplements  : monitor Mag in am      - Volume status: ?volemic - was hypotensive in ER,   + severe leg edema + anasarca   : d/to lower albumin, liver disease  : unlikely NS  : US ABD:   Heterogeneous nodular appearance of the liver compatible with known cirrhosis. Gallbladder and common duct appear grossly unremarkable in appearance. Moderate amount of ascites. - Weight change: -30 lb 11.2 oz (-13.9 kg) : down-trending. ..       : Na: Hyponatremia - d/to KACEY + cirrhosis - moderate - follow as better     - Acid-Base: stable ,   : LA elevated     - Anemia: mild        RECOMMENDATIONS:    -Continue aggressive diuresis - BUT TAPER IT NOW  - last dose 40 mg IV today  - from tomorrow lasix 40 PO BID  - no need for albumin anymore   - Add low-dose spironolactone    -Added  fluid restriction, around 1.2 L,  -remove feng     -Agree with empirically treating for SBP  - continue holding home K repletion   - abx for PNA     - no need for dialysis  - at higher risk for worsening needing close monitoring   - overall poor prognosis d/to liver disease       Other problems: Management furosemide (LASIX) 80 MG tablet Take 1 tablet by mouth daily 30 tablet 3    spironolactone (ALDACTONE) 100 MG tablet Take 1 tablet by mouth daily 30 tablet 3    potassium chloride (KLOR-CON M) 20 MEQ extended release tablet Take 1 tablet by mouth daily 30 tablet 5       Medications Prior to Admission: furosemide (LASIX) 80 MG tablet, Take 1 tablet by mouth daily  spironolactone (ALDACTONE) 100 MG tablet, Take 1 tablet by mouth daily  potassium chloride (KLOR-CON M) 20 MEQ extended release tablet, Take 1 tablet by mouth daily     Scheduled Meds:   vancomycin  1,250 mg Intravenous Q24H    magnesium sulfate  2 g Intravenous Once    albumin human  12.5 g Intravenous Q12H    ipratropium-albuterol  1 ampule Inhalation Q4H WA    sodium chloride flush  10 mL Intravenous 2 times per day    cefepime  2 g Intravenous Q12H    furosemide  40 mg Intravenous BID     Continuous Infusions:  PRN Meds:. REVIEW OF SYSTEMS:  As mentioned in chief complaint and HPI , Subjective       PHYSICAL EXAM:  Recent vital signs and recent I/Os reviewed by me.      Wt Readings from Last 3 Encounters:   08/20/20 194 lb 3.2 oz (88.1 kg)   08/13/20 229 lb 6.4 oz (104.1 kg)   08/06/20 228 lb (103.4 kg)     BP Readings from Last 3 Encounters:   08/21/20 103/66   08/13/20 112/78   08/06/20 97/73     Patient Vitals for the past 24 hrs:   BP Temp Temp src Pulse Resp SpO2   08/21/20 1232 -- -- -- -- 16 94 %   08/21/20 1230 103/66 97.3 °F (36.3 °C) Oral 77 18 94 %   08/21/20 0833 -- -- -- -- 16 95 %   08/21/20 0815 103/63 98.1 °F (36.7 °C) Oral 74 16 95 %   08/21/20 0510 (!) 161/99 97.3 °F (36.3 °C) Axillary 78 16 93 %   08/21/20 0008 103/68 97.3 °F (36.3 °C) Axillary 71 16 94 %   08/20/20 2200 104/67 97.3 °F (36.3 °C) Axillary 72 16 92 %   08/20/20 1814 101/61 98.4 °F (36.9 °C) Oral 72 16 95 %       Intake/Output Summary (Last 24 hours) at 8/21/2020 1308  Last data filed at 8/21/2020 1230  Gross per 24 hour   Intake 820 ml   Output 1550 ml   Net Value Date    TROPONINI <0.01 08/17/2020    TROPONINI <0.01 04/18/2019    TROPONINI <0.01 08/06/2018       U/A:    Lab Results   Component Value Date    COLORU Margarita 08/17/2020    PROTEINU TRACE 08/17/2020    PHUR 5.5 08/17/2020    WBCUA 16 08/17/2020    RBCUA 6 08/17/2020    MUCUS 3+ 08/06/2018    TRICHOMONAS Present 08/17/2020    BACTERIA 3+ 08/06/2018    CLARITYU Clear 08/17/2020    SPECGRAV 1.015 08/17/2020    LEUKOCYTESUR SMALL 08/17/2020    UROBILINOGEN >=8.0 08/17/2020    BILIRUBINUR LARGE 08/17/2020    BLOODU MODERATE 08/17/2020    GLUCOSEU 100 08/17/2020    AMORPHOUS 2+ 08/06/2018     Microalbumen/Creatinine ratio:  No components found for: RUCREAT  24 Hour Urine for Protein:  No components found for: RAWUPRO, UHRS3, XIXF57YH, UTV3  24 Hour Urine for Creatinine Clearance:  No components found for: CREAT4, UHRS10, UTV10  Urine Toxicology:  No components found for: IAMMENTA, IBARBIT, IBENZO, ICOCAINE, IMARTHC, IOPIATES, IPHENCYC    HgBA1c:  No results found for: LABA1C  RPR:  No results found for: RPR  HIV:  No results found for: HIV  AZAR:    Lab Results   Component Value Date    AZAR Negative 08/07/2018     RF:  No results found for: RF  DSDNA:  No components found for: DNA  AMYLASE:  No results found for: AMYLASE  LIPASE:    Lab Results   Component Value Date    LIPASE 12.0 08/17/2020     Fibrinogen Level:  No components found for: FIB       BELOW MENTIONED RADIOLOGY STUDY RESULTS BY ME:    Xr Chest Portable    Result Date: 8/17/2020  EXAMINATION: ONE XRAY VIEW OF THE CHEST 8/17/2020 6:23 pm COMPARISON: 04/22/2019 HISTORY: ORDERING SYSTEM PROVIDED HISTORY: general fatigue TECHNOLOGIST PROVIDED HISTORY: Reason for exam:->general fatigue Reason for Exam: vLeg Swelling (Pt c/o pain to bilateral lower extremities with gross amount of swelling. Denies fever. reports decreased appetite. Hx liver failure. reports not taking prescribed medications because they havent been filled by the pharmacy.  Gross abdominal swelling noted as well) Acuity: Unknown Type of Exam: Unknown FINDINGS: Lung volumes are low accentuating heart size and bronchovascular markings at the lung bases. There is left-sided pleural effusion, likely loculated, with adjacent left lung consolidation, increased compared to prior. Volume loss is also seen in the left hemithorax Hazy right basilar opacity is seen. Increasing pleuroparenchymal disease on the left. Ir Us Guided Paracentesis    Result Date: 8/13/2020  PROCEDURE: ULTRASOUND GUIDED PARACENTESIS 8/13/2020 HISTORY: ORDERING SYSTEM PROVIDED HISTORY: Ascites due to alcoholic cirrhosis ([facility] [address] TECHNIQUE: Informed consent was obtained after a detailed explanation of the procedure including risks, benefits, and alternatives. Universal protocol was followed. The right abdomen was prepped and draped in sterile fashion and local anesthesia was achieved with lidocaine. A 5 Western Kimberly Yueh needle sheath was advanced under ultrasound guidance into ascites and paracentesis was performed. The patient tolerated the procedure well. FINDINGS: A total of 5.5 L of yellow ascites was removed. Successful ultrasound guided paracentesis. Ir Us Guided Paracentesis    Result Date: 8/6/2020  PROCEDURE: ULTRASOUND GUIDED PARACENTESIS 8/6/2020 HISTORY: ORDERING SYSTEM PROVIDED HISTORY: Ascites due to alcoholic cirrhosis ([facility] [address] TECHNIQUE: Informed consent was obtained after a detailed explanation of the procedure including risks, benefits, and alternatives. Universal protocol was followed. The right abdomen was prepped and draped in sterile fashion and local anesthesia was achieved with lidocaine. An 5 Korean needle sheath was advanced under ultrasound guidance into ascites and paracentesis was performed. The patient tolerated the procedure well. 5 L removed. FINDINGS: A total of 5 L clear yellow fluid removed. Was removed. Successful ultrasound guided paracentesis.      Ir Us Guided Paracentesis    Result Date: 7/30/2020  PROCEDURE: ULTRASOUND GUIDED PARACENTESIS 7/30/2020 HISTORY: ORDERING SYSTEM PROVIDED HISTORY: Ascites due to alcoholic cirrhosis ([facility] [address] TECHNIQUE: Informed consent was obtained after a detailed explanation of the procedure including risks, benefits, and alternatives. Universal protocol was followed. The right abdomen was prepped and draped in sterile fashion and local anesthesia was achieved with lidocaine. A 5 Azeri needle sheath was advanced under ultrasound guidance into ascites and paracentesis was performed. The patient tolerated the procedure well. Estimated blood loss: Less than 5 cc FINDINGS: A total of 5.6 L was removed. Successful ultrasound guided paracentesis.

## 2020-08-21 NOTE — PROGRESS NOTES
Speech Language Pathology  Dysphagia Treatment Note    Name:  Radha Jay  :   1955  Medical Diagnosis:  Sepsis ([facility] [address] [A41.9]  Sepsis ([facility] [address] [A41.9]  Treatment Diagnosis: Oropharyngeal Dysphagia  Pain level: Pt did not report pain    Current Diet Level: Dysphagia III Soft and Bite Sized with thin liquids, meds with puree   Tolerance of Current Diet Level: Per Pt report, he had trouble with cheeseburger during lunch meal due to dry texture. Pt reported it was \"slow going down\" however denied sensation of food or liquid going down the wrong way. Assessment of Texture Tolerance:  -Impressions:  Pt was seen sitting upright in bed, he was provided with trials of thin liquids and regular solids. Thin liquids via cup revealed suspected premature bolus loss to pharynx with mildly delayed swallow initiation, no overt clinical s/s of aspiration/penetration were assessed. Mildly prolonged mastication was noted with regular solids, effective oral clearing was achieved. At this time, recommend continuation of current diet with ongoing use of aspiration precautions. Diet and Treatment Recommendations:  Continue Dysphagia III Soft and Bite Sized with thin liquids, meds with puree     Dysphagia Goals:  1.) Pt will tolerate recommended diet without s/s of aspiration (ongoing, 2020)  2.) Pt will tolerate diet advance to least restricted diet, as clinically indicated, with no signs of aspiration (ongoing, 2020)    Plan:  Continued daily Dysphagia treatment3-5x week with goals per plan of care. Patient/Family Education: Education given to the Pt and nurse, who verbalized understanding    Discharge Recommendations:  Pt will benefit from continued skilled Speech Therapy for Dysphagia services, prior to returning home. Timed Code Treatment: 0 minutes    Total Treatment Time: 10 minutes    If patient discharges prior to next session this note will serve as a discharge summary.      Ana Lam M.A., [facility]  Speech-Language Pathologist

## 2020-08-22 ENCOUNTER — APPOINTMENT (OUTPATIENT)
Dept: GENERAL RADIOLOGY | Age: 65
DRG: 871 | End: 2020-08-22
Payer: MEDICARE

## 2020-08-22 LAB
A/G RATIO: 0.8 (ref 1.1–2.2)
ALBUMIN SERPL-MCNC: 2.6 G/DL (ref 3.4–5)
ALP BLD-CCNC: 71 U/L (ref 40–129)
ALT SERPL-CCNC: 22 U/L (ref 10–40)
ANION GAP SERPL CALCULATED.3IONS-SCNC: 9 MMOL/L (ref 3–16)
ANISOCYTOSIS: ABNORMAL
APTT: 48.8 SEC (ref 24.2–36.2)
AST SERPL-CCNC: 45 U/L (ref 15–37)
BANDED NEUTROPHILS RELATIVE PERCENT: 3 % (ref 0–7)
BASOPHILS ABSOLUTE: 0 K/UL (ref 0–0.2)
BASOPHILS RELATIVE PERCENT: 0 %
BILIRUB SERPL-MCNC: 6.8 MG/DL (ref 0–1)
BILIRUBIN DIRECT: 3.1 MG/DL (ref 0–0.3)
BILIRUBIN, INDIRECT: 3.7 MG/DL (ref 0–1)
BODY FLUID CULTURE, STERILE: NORMAL
BUN BLDV-MCNC: 31 MG/DL (ref 7–20)
CALCIUM SERPL-MCNC: 7.7 MG/DL (ref 8.3–10.6)
CHLORIDE BLD-SCNC: 101 MMOL/L (ref 99–110)
CO2: 23 MMOL/L (ref 21–32)
CREAT SERPL-MCNC: 1.4 MG/DL (ref 0.8–1.3)
D DIMER: 2166 NG/ML DDU (ref 0–229)
EOSINOPHILS ABSOLUTE: 0.1 K/UL (ref 0–0.6)
EOSINOPHILS RELATIVE PERCENT: 1 %
FIBRINOGEN: 153 MG/DL (ref 200–397)
GFR AFRICAN AMERICAN: >60
GFR NON-AFRICAN AMERICAN: 51
GLOBULIN: 3.3 G/DL
GLUCOSE BLD-MCNC: 131 MG/DL (ref 70–99)
GRAM STAIN RESULT: NORMAL
HCT VFR BLD CALC: 26 % (ref 40.5–52.5)
HEMATOLOGY PATH CONSULT: NO
HEMOGLOBIN: 9.1 G/DL (ref 13.5–17.5)
HYPOCHROMIA: ABNORMAL
INR BLD: 1.51 (ref 0.86–1.14)
LYMPHOCYTES ABSOLUTE: 1.6 K/UL (ref 1–5.1)
LYMPHOCYTES RELATIVE PERCENT: 15 %
MAGNESIUM: 1.9 MG/DL (ref 1.8–2.4)
MCH RBC QN AUTO: 38.7 PG (ref 26–34)
MCHC RBC AUTO-ENTMCNC: 35 G/DL (ref 31–36)
MCV RBC AUTO: 110.6 FL (ref 80–100)
MONOCYTES ABSOLUTE: 1.2 K/UL (ref 0–1.3)
MONOCYTES RELATIVE PERCENT: 12 %
NEUTROPHILS ABSOLUTE: 7.5 K/UL (ref 1.7–7.7)
NEUTROPHILS RELATIVE PERCENT: 69 %
PDW BLD-RTO: 16.4 % (ref 12.4–15.4)
PHOSPHORUS: 1.4 MG/DL (ref 2.5–4.9)
PLATELET # BLD: 69 K/UL (ref 135–450)
PLATELET SLIDE REVIEW: ABNORMAL
PMV BLD AUTO: 9.7 FL (ref 5–10.5)
POLYCHROMASIA: ABNORMAL
POTASSIUM SERPL-SCNC: 3.7 MMOL/L (ref 3.5–5.1)
PROCALCITONIN: 0.25 NG/ML (ref 0–0.15)
PROTHROMBIN TIME: 17.6 SEC (ref 10–13.2)
RBC # BLD: 2.35 M/UL (ref 4.2–5.9)
SLIDE REVIEW: ABNORMAL
SODIUM BLD-SCNC: 133 MMOL/L (ref 136–145)
TEAR DROP CELLS: ABNORMAL
TOTAL PROTEIN: 5.9 G/DL (ref 6.4–8.2)
TOXIC GRANULATION: PRESENT
WBC # BLD: 10.4 K/UL (ref 4–11)

## 2020-08-22 PROCEDURE — 94761 N-INVAS EAR/PLS OXIMETRY MLT: CPT

## 2020-08-22 PROCEDURE — 6360000002 HC RX W HCPCS: Performed by: HOSPITALIST

## 2020-08-22 PROCEDURE — 6370000000 HC RX 637 (ALT 250 FOR IP): Performed by: INTERNAL MEDICINE

## 2020-08-22 PROCEDURE — 85379 FIBRIN DEGRADATION QUANT: CPT

## 2020-08-22 PROCEDURE — 85384 FIBRINOGEN ACTIVITY: CPT

## 2020-08-22 PROCEDURE — 84100 ASSAY OF PHOSPHORUS: CPT

## 2020-08-22 PROCEDURE — 71045 X-RAY EXAM CHEST 1 VIEW: CPT

## 2020-08-22 PROCEDURE — 85730 THROMBOPLASTIN TIME PARTIAL: CPT

## 2020-08-22 PROCEDURE — 84145 PROCALCITONIN (PCT): CPT

## 2020-08-22 PROCEDURE — 2060000000 HC ICU INTERMEDIATE R&B

## 2020-08-22 PROCEDURE — 6360000002 HC RX W HCPCS: Performed by: INTERNAL MEDICINE

## 2020-08-22 PROCEDURE — 82248 BILIRUBIN DIRECT: CPT

## 2020-08-22 PROCEDURE — 83735 ASSAY OF MAGNESIUM: CPT

## 2020-08-22 PROCEDURE — 80053 COMPREHEN METABOLIC PANEL: CPT

## 2020-08-22 PROCEDURE — 94640 AIRWAY INHALATION TREATMENT: CPT

## 2020-08-22 PROCEDURE — 85025 COMPLETE CBC W/AUTO DIFF WBC: CPT

## 2020-08-22 PROCEDURE — 36415 COLL VENOUS BLD VENIPUNCTURE: CPT

## 2020-08-22 PROCEDURE — 85610 PROTHROMBIN TIME: CPT

## 2020-08-22 PROCEDURE — 2580000003 HC RX 258: Performed by: HOSPITALIST

## 2020-08-22 RX ADMIN — IPRATROPIUM BROMIDE AND ALBUTEROL SULFATE 1 AMPULE: .5; 3 SOLUTION RESPIRATORY (INHALATION) at 07:54

## 2020-08-22 RX ADMIN — CEFEPIME HYDROCHLORIDE 2 G: 2 INJECTION, POWDER, FOR SOLUTION INTRAVENOUS at 13:58

## 2020-08-22 RX ADMIN — FUROSEMIDE 40 MG: 40 TABLET ORAL at 18:23

## 2020-08-22 RX ADMIN — HYDROMORPHONE HYDROCHLORIDE 1 MG: 1 INJECTION, SOLUTION INTRAMUSCULAR; INTRAVENOUS; SUBCUTANEOUS at 05:11

## 2020-08-22 RX ADMIN — VANCOMYCIN HYDROCHLORIDE 1250 MG: 10 INJECTION, POWDER, LYOPHILIZED, FOR SOLUTION INTRAVENOUS at 20:50

## 2020-08-22 RX ADMIN — Medication 10 ML: at 08:20

## 2020-08-22 RX ADMIN — HYDROMORPHONE HYDROCHLORIDE 1 MG: 1 INJECTION, SOLUTION INTRAMUSCULAR; INTRAVENOUS; SUBCUTANEOUS at 09:19

## 2020-08-22 RX ADMIN — IPRATROPIUM BROMIDE AND ALBUTEROL SULFATE 1 AMPULE: .5; 3 SOLUTION RESPIRATORY (INHALATION) at 15:18

## 2020-08-22 RX ADMIN — HYDROMORPHONE HYDROCHLORIDE 1 MG: 1 INJECTION, SOLUTION INTRAMUSCULAR; INTRAVENOUS; SUBCUTANEOUS at 22:28

## 2020-08-22 RX ADMIN — FUROSEMIDE 40 MG: 40 TABLET ORAL at 08:19

## 2020-08-22 RX ADMIN — HYDROMORPHONE HYDROCHLORIDE 1 MG: 1 INJECTION, SOLUTION INTRAMUSCULAR; INTRAVENOUS; SUBCUTANEOUS at 00:59

## 2020-08-22 RX ADMIN — SPIRONOLACTONE 25 MG: 25 TABLET ORAL at 08:20

## 2020-08-22 RX ADMIN — Medication 10 ML: at 20:50

## 2020-08-22 RX ADMIN — HYDROMORPHONE HYDROCHLORIDE 1 MG: 1 INJECTION, SOLUTION INTRAMUSCULAR; INTRAVENOUS; SUBCUTANEOUS at 18:23

## 2020-08-22 RX ADMIN — CEFEPIME HYDROCHLORIDE 2 G: 2 INJECTION, POWDER, FOR SOLUTION INTRAVENOUS at 22:28

## 2020-08-22 RX ADMIN — HYDROMORPHONE HYDROCHLORIDE 1 MG: 1 INJECTION, SOLUTION INTRAMUSCULAR; INTRAVENOUS; SUBCUTANEOUS at 13:54

## 2020-08-22 RX ADMIN — CEFEPIME HYDROCHLORIDE 2 G: 2 INJECTION, POWDER, FOR SOLUTION INTRAVENOUS at 03:01

## 2020-08-22 RX ADMIN — IPRATROPIUM BROMIDE AND ALBUTEROL SULFATE 1 AMPULE: .5; 3 SOLUTION RESPIRATORY (INHALATION) at 19:46

## 2020-08-22 ASSESSMENT — PAIN DESCRIPTION - PROGRESSION
CLINICAL_PROGRESSION: NOT CHANGED

## 2020-08-22 ASSESSMENT — PAIN DESCRIPTION - LOCATION: LOCATION: GENERALIZED

## 2020-08-22 ASSESSMENT — PAIN SCALES - GENERAL
PAINLEVEL_OUTOF10: 10
PAINLEVEL_OUTOF10: 8
PAINLEVEL_OUTOF10: 10
PAINLEVEL_OUTOF10: 7
PAINLEVEL_OUTOF10: 9
PAINLEVEL_OUTOF10: 10
PAINLEVEL_OUTOF10: 8
PAINLEVEL_OUTOF10: 10
PAINLEVEL_OUTOF10: 10

## 2020-08-22 ASSESSMENT — PAIN DESCRIPTION - FREQUENCY: FREQUENCY: CONTINUOUS

## 2020-08-22 ASSESSMENT — PAIN DESCRIPTION - PAIN TYPE: TYPE: ACUTE PAIN;CHRONIC PAIN

## 2020-08-22 ASSESSMENT — PAIN DESCRIPTION - DESCRIPTORS: DESCRIPTORS: CONSTANT

## 2020-08-22 ASSESSMENT — PAIN DESCRIPTION - ONSET: ONSET: ON-GOING

## 2020-08-22 NOTE — PROGRESS NOTES
MD Christian Diaz MD Lizabeth Citizen, MD               Office: [phone]                      Fax: [phone]             [address] [place] [address]                   NEPHROLOGY INPATIENT PROGRESS NOTE:     PATIENT NAME: Sav Uribe  : 1955  MRN: [id number]    Nephrology treatment plan. Acute kidney injury-improved to baseline- improved urine output. Repeat creatinine is 1.4. Chronic liver disease. Multiple electrolyte imbalance. Improving. Sodium levels improving 133. Volume status-improving. Hypotension. Improving. poor nutritional status. Anemia stable. Medications reviewed. Continue Lasix 40 mg once daily. Continue spironolactone 25 mg once daily  On IV vancomycin. Monitor. Labs in a.m. IMPRESSION:       KACEY (on no CKD): Improving to stable now   - still Oligouric   - BL Scr- 0.9  ---> 2.1 on admission  : Etiology of KACEY - presumed pre-renal / ATN w/ hypotensive / hypovolemia / diuretics     - other differentials: ? HRS  W/ Liver cirhosis  - severe as TB - 10 - S.albumin 1.8   + bile induced nephropathy   - UA: large thu, urobili, high chance of bili-induced ATN   - Urine lytes:very lower = pre-renal, likely HR syndrome     Associated problems:   - Azotemia: moderate - better   - Electrolytes: K: WNL - on K supplements  : monitor Mag in am      - Volume status: ?volemic - was hypotensive in ER,   + severe leg edema + anasarca   : d/to lower albumin, liver disease  : unlikely NS  : US ABD:   Heterogeneous nodular appearance of the liver compatible with known cirrhosis. Gallbladder and common duct appear grossly unremarkable in appearance. Moderate amount of ascites. - Weight change: 31 lb 1.6 oz (14.1 kg) : down-trending. ..       : Na: Hyponatremia - d/to KACEY + cirrhosis - moderate - follow as better     - Acid-Base: stable ,   : LA elevated     - Anemia: mild        RECOMMENDATIONS:    -Continue aggressive diuresis - BUT TAPER IT NOW  - last dose 40 mg IV today  - from tomorrow lasix 40 PO BID  - no need for albumin anymore   - Add low-dose spironolactone    -Added  fluid restriction, around 1.2 L,  -remove feng     -Agree with empirically treating for SBP  - continue holding home K repletion   - abx for PNA     - no need for dialysis  - at higher risk for worsening needing close monitoring   - overall poor prognosis d/to liver disease       Other problems: Management per primary and other consulting teams. Hospital Problems           Last Modified POA    * (Principal) Sepsis ([address] 8/17/2020 Yes    Cirrhosis ([address] 8/17/2020 Yes    Anasarca 8/18/2020 Yes    HCAP (healthcare-associated pneumonia) 8/17/2020 Yes    KACEY (acute kidney injury) ([address] 8/17/2020 Yes    Anemia 8/18/2020 Yes    Thrombocytopenia ([address] 8/18/2020 Yes    Coagulopathy ([address] 1/18/4429 Yes    Alcoholic cirrhosis ([address] 4/13/2735 Yes    Hyponatremia 8/18/2020 Yes        : other supportive care :   - Check daily renal function panel with electrolytes-phosphorus  - Strict monitoring of I/Os, daily weight  - Renal feeds/diet  - Current medications reviewed. - Nephrotoxic medications have been discontinued. - Dose adjusted and appropriate. - Dose meds for eGFR <15 mL/min/1.73m2 during KACEY    - Avoid heavy opioids due to renal failure - may use very low dose dilaudid / fentanyl with close monitoring of CNS and respiratory depression. Please refer to the orders. High Complexity. Multiple complex problems. Discussed with patient, and treatment team - hospitalist, nursing  Thank you for allowing me to participate in this patient's care. Please do not hesitate to contact me with any questions/concerns. We will follow along with you.      Marcia Peace MD       [facility] [address] [place] [address]: [phone] or [employer]  Fax: [phone]        ======================================================== ========================================================        Subjective:   Seen resting in bed today am  Denied no SOB   But still w/ major edema - said some improvement     Past medical, Surgical, Social, Family medical history reviewed by me. MEDICATIONS: reviewed by me. Medications Prior to Admission:  No current facility-administered medications on file prior to encounter. Current Outpatient Medications on File Prior to Encounter   Medication Sig Dispense Refill    furosemide (LASIX) 80 MG tablet Take 1 tablet by mouth daily 30 tablet 3    spironolactone (ALDACTONE) 100 MG tablet Take 1 tablet by mouth daily 30 tablet 3    potassium chloride (KLOR-CON M) 20 MEQ extended release tablet Take 1 tablet by mouth daily 30 tablet 5       Medications Prior to Admission: furosemide (LASIX) 80 MG tablet, Take 1 tablet by mouth daily  spironolactone (ALDACTONE) 100 MG tablet, Take 1 tablet by mouth daily  potassium chloride (KLOR-CON M) 20 MEQ extended release tablet, Take 1 tablet by mouth daily     Scheduled Meds:   vancomycin  1,250 mg Intravenous Q24H    furosemide  40 mg Oral BID    spironolactone  25 mg Oral Daily    ipratropium-albuterol  1 ampule Inhalation Q4H WA    sodium chloride flush  10 mL Intravenous 2 times per day    cefepime  2 g Intravenous Q12H     Continuous Infusions:  PRN Meds:. REVIEW OF SYSTEMS:  As mentioned in chief complaint and HPI , Subjective       PHYSICAL EXAM:  Recent vital signs and recent I/Os reviewed by me.      Wt Readings from Last 3 Encounters:   08/22/20 200 lb 14.4 oz (91.1 kg)   08/13/20 229 lb 6.4 oz (104.1 kg)   08/06/20 228 lb (103.4 kg)     BP Readings from Last 3 Encounters:   08/22/20 115/75   08/13/20 112/78   08/06/20 97/73     Patient Vitals for the past 24 hrs:   BP Temp Temp src Pulse Resp SpO2 Weight   08/22/20 1204 115/75 98.8 °F (37.1 °C) Oral 84 16 94 % --   08/22/20 0816 104/61 98.5 °F (36.9 °C) Oral 85 18 95 % --   08/22/20 0811 -- -- -- -- -- -- 200 lb 14.4 oz (91.1 kg)   08/22/20 0754 -- -- -- -- 18 94 % --   08/22/20 0450 108/61 98 °F (36.7 °C) Oral 82 16 92 % --   08/22/20 0021 (!) 111/58 98.8 °F (37.1 °C) Oral 81 16 93 % --   08/21/20 2032 119/69 99.3 °F (37.4 °C) Oral 85 16 95 % --   08/21/20 1627 -- -- -- -- 16 95 % --   08/21/20 1615 105/65 98.8 °F (37.1 °C) Oral 76 16 95 % --   08/21/20 1544 -- -- -- -- -- -- 225 lb 4.8 oz (102.2 kg)       Intake/Output Summary (Last 24 hours) at 8/22/2020 1241  Last data filed at 8/22/2020 1237  Gross per 24 hour   Intake 230 ml   Output 1150 ml   Net -920 ml          Physical exam:  General: Awake, Alert,   HENT: Atraumatic, normocephalic   Eyes: Normal conjunctiva, Non-incteral sclera. Neck: Supple, JVD not visible. CVS:  Heart sounds are normal. No murmurs. No pericardial rub. RS: Normal respiratory efforts,  Lung fields are clear. Abd: Soft , bowel sounds are normal, no distension and no tenderness to palpation. Skin: No rash ,  bruises,   CNS: Awake Oriented , No focal.   Extremities/MSK: 3+ Edema -> improving , no cyanosis. DATA:  Diagnostic tests reviewed by me for today's visit:    Recent Labs     08/20/20  0529 08/21/20  0506 08/22/20  0444   WBC 10.2 9.5 10.4   HCT 26.6* 26.0* 26.0*   PLT 51* 56* 69*     Iron Saturation:  No components found for: PERCENTFE  FERRITIN:    Lab Results   Component Value Date    FERRITIN 864.6 08/18/2020     IRON:    Lab Results   Component Value Date    IRON 137 08/07/2018     TIBC:    Lab Results   Component Value Date    TIBC see below 08/07/2018       Recent Labs     08/20/20  0529 08/21/20  0506 08/22/20  0444   * 133* 133*   K 3.8 3.4* 3.7    99 101   CO2 21 24 23   BUN 36* 34* 31*   CREATININE 1.6* 1.6* 1.4*     Recent Labs     08/20/20  0529 08/21/20  0506 08/22/20  0444   CALCIUM 7.9* 7.5* 7.7*   MG  --  1.60* 1.90   PHOS  --   --  1.4*     No results for input(s): PH, PCO2, PO2 in the last 72 hours.     Invalid input(s): found for: FIB       BELOW MENTIONED RADIOLOGY STUDY RESULTS BY ME:    Xr Chest Portable    Result Date: 8/17/2020  EXAMINATION: ONE XRAY VIEW OF THE CHEST 8/17/2020 6:23 pm COMPARISON: 04/22/2019 HISTORY: ORDERING SYSTEM PROVIDED HISTORY: general fatigue TECHNOLOGIST PROVIDED HISTORY: Reason for exam:->general fatigue Reason for Exam: vLeg Swelling (Pt c/o pain to bilateral lower extremities with gross amount of swelling. Denies fever. reports decreased appetite. Hx liver failure. reports not taking prescribed medications because they havent been filled by the pharmacy. Gross abdominal swelling noted as well) Acuity: Unknown Type of Exam: Unknown FINDINGS: Lung volumes are low accentuating heart size and bronchovascular markings at the lung bases. There is left-sided pleural effusion, likely loculated, with adjacent left lung consolidation, increased compared to prior. Volume loss is also seen in the left hemithorax Hazy right basilar opacity is seen. Increasing pleuroparenchymal disease on the left. Ir Us Guided Paracentesis    Result Date: 8/13/2020  PROCEDURE: ULTRASOUND GUIDED PARACENTESIS 8/13/2020 HISTORY: ORDERING SYSTEM PROVIDED HISTORY: Ascites due to alcoholic cirrhosis ([address] TECHNIQUE: Informed consent was obtained after a detailed explanation of the procedure including risks, benefits, and alternatives. Universal protocol was followed. The right abdomen was prepped and draped in sterile fashion and local anesthesia was achieved with lidocaine. A 5 Western Kimberly Yueh needle sheath was advanced under ultrasound guidance into ascites and paracentesis was performed. The patient tolerated the procedure well. FINDINGS: A total of 5.5 L of yellow ascites was removed. Successful ultrasound guided paracentesis.      Ir Us Guided Paracentesis    Result Date: 8/6/2020  PROCEDURE: ULTRASOUND GUIDED PARACENTESIS 8/6/2020 HISTORY: ORDERING SYSTEM PROVIDED HISTORY: Ascites due to alcoholic cirrhosis ([address] TECHNIQUE: Informed consent was obtained after a detailed explanation of the procedure including risks, benefits, and alternatives. Universal protocol was followed. The right abdomen was prepped and draped in sterile fashion and local anesthesia was achieved with lidocaine. An 5 Lao needle sheath was advanced under ultrasound guidance into ascites and paracentesis was performed. The patient tolerated the procedure well. 5 L removed. FINDINGS: A total of 5 L clear yellow fluid removed. Was removed. Successful ultrasound guided paracentesis. Ir Us Guided Paracentesis    Result Date: 7/30/2020  PROCEDURE: ULTRASOUND GUIDED PARACENTESIS 7/30/2020 HISTORY: ORDERING SYSTEM PROVIDED HISTORY: Ascites due to alcoholic cirrhosis ([facility] [address] TECHNIQUE: Informed consent was obtained after a detailed explanation of the procedure including risks, benefits, and alternatives. Universal protocol was followed. The right abdomen was prepped and draped in sterile fashion and local anesthesia was achieved with lidocaine. A 5 Lao needle sheath was advanced under ultrasound guidance into ascites and paracentesis was performed. The patient tolerated the procedure well. Estimated blood loss: Less than 5 cc FINDINGS: A total of 5.6 L was removed. Successful ultrasound guided paracentesis.

## 2020-08-22 NOTE — PROGRESS NOTES
[facility] PROGRESS NOTE    8/22/2020 8:02 AM        Name: Bradley Benitez . Admitted: 8/17/2020 no complaints  Primary Care Provider: No primary care provider on file. (Tel: None)                        Hospital course:   58 yo M with alcoholic cirrhosis, non compliance came to ER with complaints of anasarca.  Admitted as inpatient for HCAP, sepsis and KACEY.  COVID 19 negative on 8/18 from ED.   GI and Renal consulted.  Underwent 3.7 L removal of ascites by IR on 8/19    Subjective:  . No acute events overnight. Resting well. Pain control. Diet ok. Labs reviewed  Denies any chest pain sob.      Reviewed interval ancillary notes    Current Medications  vancomycin (VANCOCIN) 1,250 mg in dextrose 5 % 250 mL IVPB, Q24H  potassium chloride (KLOR-CON M) extended release tablet 40 mEq, PRN    Or  potassium bicarb-citric acid (EFFER-K) effervescent tablet 40 mEq, PRN    Or  potassium chloride 10 mEq/100 mL IVPB (Peripheral Line), PRN  furosemide (LASIX) tablet 40 mg, BID  spironolactone (ALDACTONE) tablet 25 mg, Daily  calcium carbonate (TUMS) chewable tablet 500 mg, TID PRN  HYDROmorphone HCl PF (DILAUDID) injection 1 mg, Q4H PRN  ipratropium-albuterol (DUONEB) nebulizer solution 1 ampule, Q4H WA  sodium chloride flush 0.9 % injection 10 mL, 2 times per day  sodium chloride flush 0.9 % injection 10 mL, PRN  acetaminophen (TYLENOL) tablet 650 mg, Q6H PRN    Or  acetaminophen (TYLENOL) suppository 650 mg, Q6H PRN  senna (SENOKOT) tablet 8.6 mg, Daily PRN  ondansetron (ZOFRAN-ODT) disintegrating tablet 4 mg, Q8H PRN    Or  ondansetron (ZOFRAN) injection 4 mg, Q6H PRN  cefepime (MAXIPIME) 2 g IVPB minibag, Q12H  perflutren lipid microspheres (DEFINITY) injection 1.65 mg, ONCE PRN        Objective:  /61   Pulse 82   Temp 98 °F (36.7 °C) (Oral)   Resp 18   Ht 6' 2\" (1.88 m)   Wt 225 lb 4.8 oz (102.2 kg)   SpO2 94%   BMI 28.93 kg/m²     Intake/Output Summary (Last 24 hours) at 8/22/2020 0802  Last data filed at 8/21/2020 1845  Gross per 24 hour   Intake 980 ml   Output 1800 ml   Net -820 ml      Wt Readings from Last 3 Encounters:   08/21/20 225 lb 4.8 oz (102.2 kg)   08/13/20 229 lb 6.4 oz (104.1 kg)   08/06/20 228 lb (103.4 kg)       General appearance: -American gentleman, appears comfortable  Eyes: Sclera clear. Pupils equal.  ENT: Moist oral mucosa. Trachea midline, no adenopathy, neck supple. Cardiovascular: Regular rhythm, normal S1, S2. No murmur. No edema in lower extremities  Respiratory: Not using accessory muscles. Good inspiratory effort. Clear to auscultation bilaterally, no wheeze or crackles. GI: Abdomen soft, ascites noted, tympanic to percussion, no tenderness, not distended, normal bowel sounds  Musculoskeletal: No deformity, ROM WNL. Neurology: CN 2-12 grossly intact. No speech or motor deficits  Psych: Normal affect.  Alert and oriented in time, place and person  Skin: Warm, dry, normal turgor  Extremities: Bilateral pitting edema noted edema is hard like anasarca up to thighs    Labs and Tests:  CBC:   Recent Labs     08/20/20  0529 08/21/20  0506 08/22/20  0444   WBC 10.2 9.5 10.4   HGB 9.0* 8.9* 9.1*   HCT 26.6* 26.0* 26.0*   .2* 110.0* 110.6*   PLT 51* 56* 69*     BMP:    Recent Labs     08/20/20  0529 08/21/20  0506 08/22/20  0444   * 133* 133*   K 3.8 3.4* 3.7    99 101   CO2 21 24 23   BUN 36* 34* 31*   CREATININE 1.6* 1.6* 1.4*   GLUCOSE 141* 154* 131*     Hepatic:   Recent Labs     08/20/20  0529 08/21/20  0506 08/22/20  0444   AST 33 36 45*   ALT 16 19 22   BILITOT 6.5* 5.1* 6.8*   ALKPHOS 64 66 71         Problem List  Principal Problem:    Sepsis ([facility]  Active Problems:    Cirrhosis ([facility]    Anasarca    HCAP (healthcare-associated pneumonia)    KACEY (acute kidney injury) ([facility]    Anemia    Thrombocytopenia ([facility] Coagulopathy ([facility]    Alcoholic cirrhosis (HCC)    Hyponatremia  Resolved Problems:    * No resolved hospital problems. *       Assessment & Plan:      1. Alcoholic cirrhosis: Gastroenterology service following, awaiting final recommendations for gastroenterology, Aldactone added  2. Ascites status post paracentesis on 8/19 3.7 L fluid removed, continue Lasix 40 mg twice daily p.o. for anasarca, 1.5 L of negative balance since admission except ascites removal  3. Continue empiric antibiotic treatment with cefepime vancomycin for possible H CAP, will check follow-up chest x-ray portable today and procalcitonin levels  4. Acute kidney injury on chronic kidney disease, creatinine 1.6--> 1.4 today stable, nephrology following  5. PT OT evaluation done, home health care versus skilled nursing care facility placement.        Diet: DIET LOW SODIUM 2 GM;  Dysphagia Soft and Bite-Sized; 1200 ml  Code:Full Code  DVT PPX lovenox       Maki Padilla MD   8/22/2020 8:02 AM

## 2020-08-22 NOTE — PROGRESS NOTES
2868: Shift assessment complete. VSS. Alert and oriented x4. See flowsheets. Morning medications given per MAR, whole in applesauce. The care plan and education has been reviewed and mutually agreed upon with the patient. Pt needs expressed, call light within reach, will continue to monitor. 1505: Catheter removed per Dr. Derek Butt. Redness to sacrum noted on patient while getting cleaned up. Zinc paste and Mepilex applied.

## 2020-08-23 LAB
A/G RATIO: 0.7 (ref 1.1–2.2)
ALBUMIN SERPL-MCNC: 2.4 G/DL (ref 3.4–5)
ALP BLD-CCNC: 70 U/L (ref 40–129)
ALT SERPL-CCNC: 24 U/L (ref 10–40)
ANION GAP SERPL CALCULATED.3IONS-SCNC: 7 MMOL/L (ref 3–16)
APTT: 48.5 SEC (ref 24.2–36.2)
AST SERPL-CCNC: 43 U/L (ref 15–37)
BASOPHILS ABSOLUTE: 0 K/UL (ref 0–0.2)
BASOPHILS RELATIVE PERCENT: 0.3 %
BILIRUB SERPL-MCNC: 9.1 MG/DL (ref 0–1)
BILIRUBIN DIRECT: 4.7 MG/DL (ref 0–0.3)
BILIRUBIN, INDIRECT: 4.4 MG/DL (ref 0–1)
BUN BLDV-MCNC: 27 MG/DL (ref 7–20)
CALCIUM SERPL-MCNC: 7.8 MG/DL (ref 8.3–10.6)
CHLORIDE BLD-SCNC: 102 MMOL/L (ref 99–110)
CO2: 25 MMOL/L (ref 21–32)
CREAT SERPL-MCNC: 1.3 MG/DL (ref 0.8–1.3)
CULTURE, RESPIRATORY: ABNORMAL
CULTURE, RESPIRATORY: ABNORMAL
D DIMER: 2643 NG/ML DDU (ref 0–229)
EOSINOPHILS ABSOLUTE: 0.2 K/UL (ref 0–0.6)
EOSINOPHILS RELATIVE PERCENT: 1.3 %
FIBRINOGEN: 155 MG/DL (ref 200–397)
GFR AFRICAN AMERICAN: >60
GFR NON-AFRICAN AMERICAN: 55
GLOBULIN: 3.5 G/DL
GLUCOSE BLD-MCNC: 145 MG/DL (ref 70–99)
GRAM STAIN RESULT: ABNORMAL
HCT VFR BLD CALC: 27 % (ref 40.5–52.5)
HEMATOLOGY PATH CONSULT: NO
HEMOGLOBIN: 9.4 G/DL (ref 13.5–17.5)
INR BLD: 1.65 (ref 0.86–1.14)
LYMPHOCYTES ABSOLUTE: 1.3 K/UL (ref 1–5.1)
LYMPHOCYTES RELATIVE PERCENT: 8.9 %
MAGNESIUM: 1.7 MG/DL (ref 1.8–2.4)
MCH RBC QN AUTO: 38.4 PG (ref 26–34)
MCHC RBC AUTO-ENTMCNC: 34.7 G/DL (ref 31–36)
MCV RBC AUTO: 110.8 FL (ref 80–100)
MONOCYTES ABSOLUTE: 3.1 K/UL (ref 0–1.3)
MONOCYTES RELATIVE PERCENT: 21 %
NEUTROPHILS ABSOLUTE: 10 K/UL (ref 1.7–7.7)
NEUTROPHILS RELATIVE PERCENT: 68.5 %
ORGANISM: ABNORMAL
PDW BLD-RTO: 16.2 % (ref 12.4–15.4)
PHOSPHORUS: 1.6 MG/DL (ref 2.5–4.9)
PLATELET # BLD: 78 K/UL (ref 135–450)
PMV BLD AUTO: 8.9 FL (ref 5–10.5)
POTASSIUM REFLEX MAGNESIUM: 3.6 MMOL/L (ref 3.5–5.1)
PROTHROMBIN TIME: 19.2 SEC (ref 10–13.2)
RBC # BLD: 2.44 M/UL (ref 4.2–5.9)
SODIUM BLD-SCNC: 134 MMOL/L (ref 136–145)
TOTAL PROTEIN: 5.9 G/DL (ref 6.4–8.2)
WBC # BLD: 14.6 K/UL (ref 4–11)

## 2020-08-23 PROCEDURE — 6370000000 HC RX 637 (ALT 250 FOR IP): Performed by: INTERNAL MEDICINE

## 2020-08-23 PROCEDURE — 80053 COMPREHEN METABOLIC PANEL: CPT

## 2020-08-23 PROCEDURE — 6370000000 HC RX 637 (ALT 250 FOR IP): Performed by: HOSPITALIST

## 2020-08-23 PROCEDURE — 6360000002 HC RX W HCPCS: Performed by: HOSPITALIST

## 2020-08-23 PROCEDURE — 85610 PROTHROMBIN TIME: CPT

## 2020-08-23 PROCEDURE — 84100 ASSAY OF PHOSPHORUS: CPT

## 2020-08-23 PROCEDURE — 87040 BLOOD CULTURE FOR BACTERIA: CPT

## 2020-08-23 PROCEDURE — 85379 FIBRIN DEGRADATION QUANT: CPT

## 2020-08-23 PROCEDURE — 2060000000 HC ICU INTERMEDIATE R&B

## 2020-08-23 PROCEDURE — 94640 AIRWAY INHALATION TREATMENT: CPT

## 2020-08-23 PROCEDURE — 6360000002 HC RX W HCPCS: Performed by: INTERNAL MEDICINE

## 2020-08-23 PROCEDURE — 83735 ASSAY OF MAGNESIUM: CPT

## 2020-08-23 PROCEDURE — 89051 BODY FLUID CELL COUNT: CPT

## 2020-08-23 PROCEDURE — 85384 FIBRINOGEN ACTIVITY: CPT

## 2020-08-23 PROCEDURE — 36415 COLL VENOUS BLD VENIPUNCTURE: CPT

## 2020-08-23 PROCEDURE — 2580000003 HC RX 258: Performed by: HOSPITALIST

## 2020-08-23 PROCEDURE — 85730 THROMBOPLASTIN TIME PARTIAL: CPT

## 2020-08-23 PROCEDURE — 85025 COMPLETE CBC W/AUTO DIFF WBC: CPT

## 2020-08-23 PROCEDURE — 94761 N-INVAS EAR/PLS OXIMETRY MLT: CPT

## 2020-08-23 RX ADMIN — HYDROMORPHONE HYDROCHLORIDE 1 MG: 1 INJECTION, SOLUTION INTRAMUSCULAR; INTRAVENOUS; SUBCUTANEOUS at 12:17

## 2020-08-23 RX ADMIN — HYDROMORPHONE HYDROCHLORIDE 1 MG: 1 INJECTION, SOLUTION INTRAMUSCULAR; INTRAVENOUS; SUBCUTANEOUS at 20:06

## 2020-08-23 RX ADMIN — ANTACID TABLETS 500 MG: 500 TABLET, CHEWABLE ORAL at 01:35

## 2020-08-23 RX ADMIN — IPRATROPIUM BROMIDE AND ALBUTEROL SULFATE 1 AMPULE: .5; 3 SOLUTION RESPIRATORY (INHALATION) at 12:30

## 2020-08-23 RX ADMIN — ANTACID TABLETS 500 MG: 500 TABLET, CHEWABLE ORAL at 16:04

## 2020-08-23 RX ADMIN — HYDROMORPHONE HYDROCHLORIDE 1 MG: 1 INJECTION, SOLUTION INTRAMUSCULAR; INTRAVENOUS; SUBCUTANEOUS at 16:04

## 2020-08-23 RX ADMIN — FUROSEMIDE 40 MG: 40 TABLET ORAL at 08:51

## 2020-08-23 RX ADMIN — Medication 10 ML: at 08:51

## 2020-08-23 RX ADMIN — ANTACID TABLETS 500 MG: 500 TABLET, CHEWABLE ORAL at 20:11

## 2020-08-23 RX ADMIN — HYDROMORPHONE HYDROCHLORIDE 1 MG: 1 INJECTION, SOLUTION INTRAMUSCULAR; INTRAVENOUS; SUBCUTANEOUS at 02:53

## 2020-08-23 RX ADMIN — CEFEPIME HYDROCHLORIDE 2 G: 2 INJECTION, POWDER, FOR SOLUTION INTRAVENOUS at 22:35

## 2020-08-23 RX ADMIN — IPRATROPIUM BROMIDE AND ALBUTEROL SULFATE 1 AMPULE: .5; 3 SOLUTION RESPIRATORY (INHALATION) at 21:25

## 2020-08-23 RX ADMIN — VANCOMYCIN HYDROCHLORIDE 1250 MG: 10 INJECTION, POWDER, LYOPHILIZED, FOR SOLUTION INTRAVENOUS at 20:11

## 2020-08-23 RX ADMIN — CEFEPIME HYDROCHLORIDE 2 G: 2 INJECTION, POWDER, FOR SOLUTION INTRAVENOUS at 15:09

## 2020-08-23 RX ADMIN — CEFEPIME HYDROCHLORIDE 2 G: 2 INJECTION, POWDER, FOR SOLUTION INTRAVENOUS at 06:16

## 2020-08-23 RX ADMIN — ANTACID TABLETS 500 MG: 500 TABLET, CHEWABLE ORAL at 10:24

## 2020-08-23 RX ADMIN — HYDROMORPHONE HYDROCHLORIDE 1 MG: 1 INJECTION, SOLUTION INTRAMUSCULAR; INTRAVENOUS; SUBCUTANEOUS at 07:53

## 2020-08-23 RX ADMIN — SPIRONOLACTONE 25 MG: 25 TABLET ORAL at 08:51

## 2020-08-23 RX ADMIN — Medication 10 ML: at 20:06

## 2020-08-23 RX ADMIN — FUROSEMIDE 40 MG: 40 TABLET ORAL at 17:25

## 2020-08-23 RX ADMIN — IPRATROPIUM BROMIDE AND ALBUTEROL SULFATE 1 AMPULE: .5; 3 SOLUTION RESPIRATORY (INHALATION) at 09:50

## 2020-08-23 RX ADMIN — IPRATROPIUM BROMIDE AND ALBUTEROL SULFATE 1 AMPULE: .5; 3 SOLUTION RESPIRATORY (INHALATION) at 17:02

## 2020-08-23 ASSESSMENT — PAIN SCALES - GENERAL
PAINLEVEL_OUTOF10: 10
PAINLEVEL_OUTOF10: 9
PAINLEVEL_OUTOF10: 10
PAINLEVEL_OUTOF10: 10
PAINLEVEL_OUTOF10: 7
PAINLEVEL_OUTOF10: 7
PAINLEVEL_OUTOF10: 0
PAINLEVEL_OUTOF10: 8
PAINLEVEL_OUTOF10: 10
PAINLEVEL_OUTOF10: 9
PAINLEVEL_OUTOF10: 10
PAINLEVEL_OUTOF10: 10

## 2020-08-23 NOTE — PROGRESS NOTES
Scot Mulligan, MD Hedy Harden, MD Sharlot Rubinstein, MD               Office: [phone]                      Fax: [phone]             [address] [place] [address]                   NEPHROLOGY INPATIENT PROGRESS NOTE:     PATIENT NAME: Lesley Sutherland  : 1955  MRN: [id number]    Nephrology treatment plan. Acute kidney injury-improved to baseline. Creatinine is 1.3. Multiple electrolyte imbalance improved. Fluid overload. Improved. Continue Lasix 40 mg once daily. Continue spironolactone 25 mg once daily. Continue antibiotics. Patient going for repeat acetic tap. Stable from renal.  We will sign off at this time. Please call if needed. Acute kidney injury-improved to baseline- improved urine output. Repeat creatinine is 1.4. Chronic liver disease. Multiple electrolyte imbalance. Improving. Sodium levels improving 133. Volume status-improving. Hypotension. Improving. poor nutritional status. Anemia stable. Medications reviewed. Continue Lasix 40 mg once daily. Continue spironolactone 25 mg once daily  On IV vancomycin. Monitor. Labs in a.m. IMPRESSION:       KACEY (on no CKD): Improving to stable now   - still Oligouric   - BL Scr- 0.9  ---> 2.1 on admission  : Etiology of KACEY - presumed pre-renal / ATN w/ hypotensive / hypovolemia / diuretics     - other differentials: ? HRS  W/ Liver cirhosis  - severe as TB - 10 - S.albumin 1.8   + bile induced nephropathy   - UA: large thu, urobili, high chance of bili-induced ATN   - Urine lytes:very lower = pre-renal, likely HR syndrome     Associated problems:   - Azotemia: moderate - better   - Electrolytes: K: WNL - on K supplements  : monitor Mag in am      - Volume status: ?volemic - was hypotensive in ER,   + severe leg edema + anasarca   : d/to lower albumin, liver disease  : unlikely NS  : US ABD:   Heterogeneous nodular appearance of the liver compatible with known cirrhosis. Gallbladder and common duct appear grossly unremarkable in appearance. Moderate amount of ascites. - Weight change: -24 lb 6.4 oz (-11.1 kg) : down-trending. ..       : Na: Hyponatremia - d/to KACEY + cirrhosis - moderate - follow as better     - Acid-Base: stable ,   : LA elevated     - Anemia: mild        RECOMMENDATIONS:    -Continue aggressive diuresis - BUT TAPER IT NOW  - last dose 40 mg IV today  - from tomorrow lasix 40 PO BID  - no need for albumin anymore   - Add low-dose spironolactone    -Added  fluid restriction, around 1.2 L,  -remove feng     -Agree with empirically treating for SBP  - continue holding home K repletion   - abx for PNA     - no need for dialysis  - at higher risk for worsening needing close monitoring   - overall poor prognosis d/to liver disease       Other problems: Management per primary and other consulting teams. Hospital Problems           Last Modified POA    * (Principal) Sepsis ([address] 8/17/2020 Yes    Cirrhosis ([address] 8/17/2020 Yes    Anasarca 8/18/2020 Yes    HCAP (healthcare-associated pneumonia) 8/17/2020 Yes    KACEY (acute kidney injury) ([address] 8/17/2020 Yes    Anemia 8/18/2020 Yes    Thrombocytopenia ([address] 8/18/2020 Yes    Coagulopathy ([address] 2/28/7537 Yes    Alcoholic cirrhosis ([address] 1/19/4985 Yes    Hyponatremia 8/18/2020 Yes        : other supportive care :   - Check daily renal function panel with electrolytes-phosphorus  - Strict monitoring of I/Os, daily weight  - Renal feeds/diet  - Current medications reviewed. - Nephrotoxic medications have been discontinued. - Dose adjusted and appropriate. - Dose meds for eGFR <15 mL/min/1.73m2 during KACEY    - Avoid heavy opioids due to renal failure - may use very low dose dilaudid / fentanyl with close monitoring of CNS and respiratory depression. Please refer to the orders. High Complexity. Multiple complex problems.   Discussed with patient, and treatment team - hospitalist, nursing  Thank you for allowing me to participate in this patient's care. Please do not hesitate to contact me with any questions/concerns. We will follow along with you. Lyssa Willis MD       [facility] [address] [place]: [phone] or [address]  Fax: [phone]        ========================================================   ========================================================        Subjective:   Seen resting in bed today am  Denied no SOB   But still w/ major edema - said some improvement     Past medical, Surgical, Social, Family medical history reviewed by me. MEDICATIONS: reviewed by me. Medications Prior to Admission:  No current facility-administered medications on file prior to encounter. Current Outpatient Medications on File Prior to Encounter   Medication Sig Dispense Refill    furosemide (LASIX) 80 MG tablet Take 1 tablet by mouth daily 30 tablet 3    spironolactone (ALDACTONE) 100 MG tablet Take 1 tablet by mouth daily 30 tablet 3    potassium chloride (KLOR-CON M) 20 MEQ extended release tablet Take 1 tablet by mouth daily 30 tablet 5       Medications Prior to Admission: furosemide (LASIX) 80 MG tablet, Take 1 tablet by mouth daily  spironolactone (ALDACTONE) 100 MG tablet, Take 1 tablet by mouth daily  potassium chloride (KLOR-CON M) 20 MEQ extended release tablet, Take 1 tablet by mouth daily     Scheduled Meds:   cefepime  2 g Intravenous Q8H    vancomycin  1,250 mg Intravenous Q24H    furosemide  40 mg Oral BID    spironolactone  25 mg Oral Daily    ipratropium-albuterol  1 ampule Inhalation Q4H WA    sodium chloride flush  10 mL Intravenous 2 times per day     Continuous Infusions:  PRN Meds:. REVIEW OF SYSTEMS:  As mentioned in chief complaint and HPI , Subjective       PHYSICAL EXAM:  Recent vital signs and recent I/Os reviewed by me.      Wt Readings from Last 3 Encounters:   08/23/20 197 lb 14.4 oz (89.8 kg) 08/13/20 229 lb 6.4 oz (104.1 kg)   08/06/20 228 lb (103.4 kg)     BP Readings from Last 3 Encounters:   08/23/20 129/73   08/13/20 112/78   08/06/20 97/73     Patient Vitals for the past 24 hrs:   BP Temp Temp src Pulse Resp SpO2 Weight   08/23/20 1213 129/73 98.6 °F (37 °C) Oral 86 18 96 % --   08/23/20 0951 -- -- -- -- 18 97 % --   08/23/20 0841 105/65 98.7 °F (37.1 °C) Oral 88 16 94 % --   08/23/20 0714 -- -- -- -- -- -- 197 lb 14.4 oz (89.8 kg)   08/23/20 0508 107/66 98.7 °F (37.1 °C) Oral 86 16 95 % --   08/23/20 0033 117/70 99 °F (37.2 °C) Oral 87 17 95 % --   08/22/20 2228 -- -- -- -- -- 94 % --   08/22/20 1948 109/66 98.2 °F (36.8 °C) Oral 76 16 95 % --   08/22/20 1742 108/69 99 °F (37.2 °C) Oral 81 20 96 % --   08/22/20 1518 -- -- -- -- 18 98 % --       Intake/Output Summary (Last 24 hours) at 8/23/2020 1226  Last data filed at 8/23/2020 1012  Gross per 24 hour   Intake 240 ml   Output 1850 ml   Net -1610 ml          Physical exam:  General: Awake, Alert,   HENT: Atraumatic, normocephalic   Eyes: Normal conjunctiva, Non-incteral sclera. Neck: Supple, JVD not visible. CVS:  Heart sounds are normal. No murmurs. No pericardial rub. RS: Normal respiratory efforts,  Lung fields are clear. Abd: Soft , bowel sounds are normal, no distension and no tenderness to palpation. Skin: No rash ,  bruises,   CNS: Awake Oriented , No focal.   Extremities/MSK: 3+ Edema -> improving , no cyanosis.            DATA:  Diagnostic tests reviewed by me for today's visit:    Recent Labs     08/21/20  0506 08/22/20  0444 08/23/20  0521   WBC 9.5 10.4 14.6*   HCT 26.0* 26.0* 27.0*   PLT 56* 69* 78*     Iron Saturation:  No components found for: PERCENTFE  FERRITIN:    Lab Results   Component Value Date    FERRITIN 864.6 08/18/2020     IRON:    Lab Results   Component Value Date    IRON 137 08/07/2018     TIBC:    Lab Results   Component Value Date    TIBC see below 08/07/2018       Recent Labs     08/21/20  0506 08/22/20  0444 08/23/20  0521   * 133* 134*   K 3.4* 3.7 3.6   CL 99 101 102   CO2 24 23 25   BUN 34* 31* 27*   CREATININE 1.6* 1.4* 1.3     Recent Labs     08/21/20  0506 08/22/20  0444 08/23/20  0521   CALCIUM 7.5* 7.7* 7.8*   MG 1.60* 1.90 1.70*   PHOS  --  1.4* 1.6*     No results for input(s): PH, PCO2, PO2 in the last 72 hours.     Invalid input(s): Laurie Darien    ABG:  No results found for: PH, PCO2, PO2, HCO3, BE, THGB, TCO2, O2SAT  VBG:  No results found for: PHVEN, NLI9XTV, BEVEN, B5PABSVE    LDH:    Lab Results   Component Value Date     08/18/2020     Uric Acid:    Lab Results   Component Value Date    LABURIC 8.1 08/17/2020       PT/INR:    Lab Results   Component Value Date    PROTIME 19.2 08/23/2020    INR 1.65 08/23/2020     Warfarin PT/INR:  No components found for: PTPATWAR, PTINRWAR  PTT:    Lab Results   Component Value Date    APTT 48.5 08/23/2020   [APTT}  Last 3 Troponin:    Lab Results   Component Value Date    TROPONINI <0.01 08/17/2020    TROPONINI <0.01 04/18/2019    TROPONINI <0.01 08/06/2018       U/A:    Lab Results   Component Value Date    COLORU Margarita 08/17/2020    PROTEINU TRACE 08/17/2020    PHUR 5.5 08/17/2020    WBCUA 16 08/17/2020    RBCUA 6 08/17/2020    MUCUS 3+ 08/06/2018    TRICHOMONAS Present 08/17/2020    BACTERIA 3+ 08/06/2018    CLARITYU Clear 08/17/2020    SPECGRAV 1.015 08/17/2020    LEUKOCYTESUR SMALL 08/17/2020    UROBILINOGEN >=8.0 08/17/2020    BILIRUBINUR LARGE 08/17/2020    BLOODU MODERATE 08/17/2020    GLUCOSEU 100 08/17/2020    AMORPHOUS 2+ 08/06/2018     Microalbumen/Creatinine ratio:  No components found for: RUCREAT  24 Hour Urine for Protein:  No components found for: RAWUPRO, UHRS3, YMKE61TJ, UTV3  24 Hour Urine for Creatinine Clearance:  No components found for: CREAT4, UHRS10, UTV10  Urine Toxicology:  No components found for: IAMMENTA, IBARBIT, IBENZO, ICOCAINE, IMARTHC, IOPIATES, IPHENCYC    HgBA1c:  No results found for: LABA1C  RPR:  No results found for: RPR  HIV:  No results found for: HIV  AZAR:    Lab Results   Component Value Date    AZAR Negative 08/07/2018     RF:  No results found for: RF  DSDNA:  No components found for: DNA  AMYLASE:  No results found for: AMYLASE  LIPASE:    Lab Results   Component Value Date    LIPASE 12.0 08/17/2020     Fibrinogen Level:  No components found for: FIB       BELOW MENTIONED RADIOLOGY STUDY RESULTS BY ME:    Xr Chest Portable    Result Date: 8/17/2020  EXAMINATION: ONE XRAY VIEW OF THE CHEST 8/17/2020 6:23 pm COMPARISON: 04/22/2019 HISTORY: ORDERING SYSTEM PROVIDED HISTORY: general fatigue TECHNOLOGIST PROVIDED HISTORY: Reason for exam:->general fatigue Reason for Exam: vLeg Swelling (Pt c/o pain to bilateral lower extremities with gross amount of swelling. Denies fever. reports decreased appetite. Hx liver failure. reports not taking prescribed medications because they havent been filled by the pharmacy. Gross abdominal swelling noted as well) Acuity: Unknown Type of Exam: Unknown FINDINGS: Lung volumes are low accentuating heart size and bronchovascular markings at the lung bases. There is left-sided pleural effusion, likely loculated, with adjacent left lung consolidation, increased compared to prior. Volume loss is also seen in the left hemithorax Hazy right basilar opacity is seen. Increasing pleuroparenchymal disease on the left. Ir Us Guided Paracentesis    Result Date: 8/13/2020  PROCEDURE: ULTRASOUND GUIDED PARACENTESIS 8/13/2020 HISTORY: ORDERING SYSTEM PROVIDED HISTORY: Ascites due to alcoholic cirrhosis ([address] TECHNIQUE: Informed consent was obtained after a detailed explanation of the procedure including risks, benefits, and alternatives. Universal protocol was followed. The right abdomen was prepped and draped in sterile fashion and local anesthesia was achieved with lidocaine.   A 5 [place] Kimber[place] Yueh needle sheath was advanced under ultrasound guidance into ascites and paracentesis was performed. The patient tolerated the procedure well. FINDINGS: A total of 5.5 L of yellow ascites was removed. Successful ultrasound guided paracentesis. Ir Us Guided Paracentesis    Result Date: 8/6/2020  PROCEDURE: ULTRASOUND GUIDED PARACENTESIS 8/6/2020 HISTORY: ORDERING SYSTEM PROVIDED HISTORY: Ascites due to alcoholic cirrhosis ([facility] TECHNIQUE: Informed consent was obtained after a detailed explanation of the procedure including risks, benefits, and alternatives. Universal protocol was followed. The right abdomen was prepped and draped in sterile fashion and local anesthesia was achieved with lidocaine. An 5 [place] needle sheath was advanced under ultrasound guidance into ascites and paracentesis was performed. The patient tolerated the procedure well. 5 L removed. FINDINGS: A total of 5 L clear yellow fluid removed. Was removed. Successful ultrasound guided paracentesis. Ir Us Guided Paracentesis    Result Date: 7/30/2020  PROCEDURE: ULTRASOUND GUIDED PARACENTESIS 7/30/2020 HISTORY: ORDERING SYSTEM PROVIDED HISTORY: Ascites due to alcoholic cirrhosis ([facility] TECHNIQUE: Informed consent was obtained after a detailed explanation of the procedure including risks, benefits, and alternatives. Universal protocol was followed. The right abdomen was prepped and draped in sterile fashion and local anesthesia was achieved with lidocaine. A 5 [place] needle sheath was advanced under ultrasound guidance into ascites and paracentesis was performed. The patient tolerated the procedure well. Estimated blood loss: Less than 5 cc FINDINGS: A total of 5.6 L was removed. Successful ultrasound guided paracentesis.

## 2020-08-23 NOTE — PROGRESS NOTES
[facility]    8/23/2020 7:33 AM        Name: Yaron Estrada . Admitted: 8/17/2020  Primary Care Provider: No primary care provider on file. (Tel: None)                        Hospital course:   58 yo M with alcoholic cirrhosis, non compliance came to ER with complaints of anasarca.  Admitted as inpatient for HCAP, sepsis and KACEY.  COVID 19 negative on 8/18 from ED.   GI and Renal consulted.  Underwent 3.7 L removal of ascites by IR on 8/19    Subjective:  . No acute events overnight. Resting well. Pain control. Diet ok. Labs reviewed  Denies any chest pain sob.      Reviewed interval ancillary notes    Current Medications  cefepime (MAXIPIME) 2 g IVPB minibag, Q8H  vancomycin (VANCOCIN) 1,250 mg in dextrose 5 % 250 mL IVPB, Q24H  potassium chloride (KLOR-CON M) extended release tablet 40 mEq, PRN    Or  potassium bicarb-citric acid (EFFER-K) effervescent tablet 40 mEq, PRN    Or  potassium chloride 10 mEq/100 mL IVPB (Peripheral Line), PRN  furosemide (LASIX) tablet 40 mg, BID  spironolactone (ALDACTONE) tablet 25 mg, Daily  calcium carbonate (TUMS) chewable tablet 500 mg, TID PRN  HYDROmorphone HCl PF (DILAUDID) injection 1 mg, Q4H PRN  ipratropium-albuterol (DUONEB) nebulizer solution 1 ampule, Q4H WA  sodium chloride flush 0.9 % injection 10 mL, 2 times per day  sodium chloride flush 0.9 % injection 10 mL, PRN  acetaminophen (TYLENOL) tablet 650 mg, Q6H PRN    Or  acetaminophen (TYLENOL) suppository 650 mg, Q6H PRN  senna (SENOKOT) tablet 8.6 mg, Daily PRN  ondansetron (ZOFRAN-ODT) disintegrating tablet 4 mg, Q8H PRN    Or  ondansetron (ZOFRAN) injection 4 mg, Q6H PRN  perflutren lipid microspheres (DEFINITY) injection 1.65 mg, ONCE PRN        Objective:  /66   Pulse 86   Temp 98.7 °F (37.1 °C) (Oral)   Resp 16   Ht 6' 2\" (1.88 m)   Wt 197 lb 14.4 oz (89.8 kg)   SpO2 95%   BMI 25.41 kg/m²     Intake/Output Summary (Last 24 hours) at 8/23/2020 0324  Last data filed at 8/23/2020 3757  Gross per 24 hour   Intake --   Output 1475 ml   Net -1475 ml      Wt Readings from Last 3 Encounters:   08/23/20 197 lb 14.4 oz (89.8 kg)   08/13/20 229 lb 6.4 oz (104.1 kg)   08/06/20 228 lb (103.4 kg)       General appearance: -American gentleman appears comfortable  Eyes: Sclera clear. Pupils equal.  ENT: Moist oral mucosa. Trachea midline, no adenopathy, neck supple. Cardiovascular: Regular rhythm, normal S1, S2. No murmur. No edema in lower extremities  Respiratory: Not using accessory muscles. Good inspiratory effort. Clear to auscultation bilaterally, no wheeze or crackles. GI: Abdomen soft, some tenderness, distended due to ascites, normal bowel sounds  Musculoskeletal: No deformity, ROM WNL. Neurology: CN 2-12 grossly intact. No speech or motor deficits  Psych: Normal affect.  Alert and oriented in time, place and person  Skin: Warm, dry, normal turgor  Extremities nonpitting edema noted  Labs and Tests:  CBC:   Recent Labs     08/21/20  0506 08/22/20  0444 08/23/20  0521   WBC 9.5 10.4 14.6*   HGB 8.9* 9.1* 9.4*   HCT 26.0* 26.0* 27.0*   .0* 110.6* 110.8*   PLT 56* 69* 78*     BMP:    Recent Labs     08/21/20  0506 08/22/20  0444 08/23/20  0521   * 133* 134*   K 3.4* 3.7 3.6   CL 99 101 102   CO2 24 23 25   BUN 34* 31* 27*   CREATININE 1.6* 1.4* 1.3   GLUCOSE 154* 131* 145*     Hepatic:   Recent Labs     08/21/20  0506 08/22/20  0444 08/23/20  0521   AST 36 45* 43*   ALT 19 22 24   BILITOT 5.1* 6.8* 9.1*   ALKPHOS 66 71 70         Problem List  Principal Problem:    Sepsis ([address]  Active Problems:    Cirrhosis ([facility]    Anasarca    HCAP (healthcare-associated pneumonia)    KACEY (acute kidney injury) ([facility]    Anemia    Thrombocytopenia (HCC)    Coagulopathy (HCC)    Alcoholic cirrhosis (HCC)    Hyponatremia  Resolved Problems:    * No resolved hospital problems. *     Assessment & Plan:      1.  Alcoholic cirrhosis: Gastroenterology service wants to repeat  another paracentesis tomorrow, thinking partially treated spontaneous bacterial peritonitis since neutrophil count to 228( close to 250), Aldactone added. 2.  Ascites status post paracentesis on 8/19 3.7 L fluid removed, continue Lasix 40 mg twice daily p.o. for anasarca, 3.2 L of negative balance since admission except ascites removal  3.  Continue empiric antibiotic treatment with cefepime vancomycin for possible H CAP, will check follow-up chest x-ray portable today and procalcitonin levels  4.  Acute kidney injury on chronic kidney disease, creatinine 1.6--> 1.4--> 1.3 today stable, nephrology following  5.  PT OT evaluation done, home health care versus skilled nursing care facility placement.     Diet: DIET LOW SODIUM 2 GM;  Dysphagia Soft and Bite-Sized; 1200 ml  Code:Full Code  DVT PPX lovenox       Miguel Shearer MD   8/23/2020 7:33 AM

## 2020-08-23 NOTE — PROGRESS NOTES
[facility]  Gastroenterology Progress Note    Shiraz Romero is a 59 y.o. male patient. 1. Severe sepsis ([facility]    2. Pneumonia due to organism    3. Acute kidney injury ([facility]        SUBJECTIVE:  Pt with some abd pain but more chronic. No N/V. tolerating diet. ROS:  Cardiovascular ROS: no chest pain or dyspnea on exertion  Gastrointestinal ROS: see hpi  Respiratory ROS: no cough, shortness of breath, or wheezing    Physical    VITALS:  /65   Pulse 88   Temp 98.7 °F (37.1 °C) (Oral)   Resp 16   Ht 6' 2\" (1.88 m)   Wt 197 lb 14.4 oz (89.8 kg)   SpO2 94%   BMI 25.41 kg/m²   TEMPERATURE:  Current - Temp: 98.7 °F (37.1 °C); Max - Temp  Av.7 °F (37.1 °C)  Min: 98.2 °F (36.8 °C)  Max: 99 °F (37.2 °C)    NAD  RRR, Nl s1s2  Lungs CTA Bilaterally, normal effort  Abdomen soft, mild distention, NT, no HSM, Bowel sounds normal  AAOx3, No asterixis   Pitting edema BLE to thighs and flank    Data    Data Review:    Recent Labs     20  05020  04420  0521   WBC 9.5 10.4 14.6*   HGB 8.9* 9.1* 9.4*   HCT 26.0* 26.0* 27.0*   .0* 110.6* 110.8*   PLT 56* 69* 78*     Recent Labs     20  0506 20  0444 20  0521   * 133* 134*   K 3.4* 3.7 3.6   CL 99 101 102   CO2 24 23 25   PHOS  --  1.4* 1.6*   BUN 34* 31* 27*   CREATININE 1.6* 1.4* 1.3     Recent Labs     20  0506 20  0444 20  0521   AST 36 45* 43*   ALT 19 22 24   BILIDIR 2.4* 3.1* 4.7*   BILITOT 5.1* 6.8* 9.1*   ALKPHOS 66 71 70     No results for input(s): LIPASE, AMYLASE in the last 72 hours. Recent Labs     20  0506 20  0444 20  0521   PROTIME 22.3* 17.6* 19.2*   INR 1.91* 1.51* 1.65*     No results for input(s): PTT in the last 72 hours. ASSESSMENT / PLAN      Cirrhosis - due to alcohol abuse. No GI bleeding. No prior EGD. Worsening hepatic function today now with leukocytosis. Pt on Cefepime. Afeb. S/p para with 3.5 L fluid removed .  Neutrophil count

## 2020-08-24 ENCOUNTER — APPOINTMENT (OUTPATIENT)
Dept: INTERVENTIONAL RADIOLOGY/VASCULAR | Age: 65
DRG: 871 | End: 2020-08-24
Payer: MEDICARE

## 2020-08-24 LAB
A/G RATIO: 0.7 (ref 1.1–2.2)
ALBUMIN SERPL-MCNC: 2.4 G/DL (ref 3.4–5)
ALP BLD-CCNC: 73 U/L (ref 40–129)
ALT SERPL-CCNC: 26 U/L (ref 10–40)
ANION GAP SERPL CALCULATED.3IONS-SCNC: 9 MMOL/L (ref 3–16)
ANISOCYTOSIS: ABNORMAL
APPEARANCE FLUID: NORMAL
APTT: 50.6 SEC (ref 24.2–36.2)
AST SERPL-CCNC: 48 U/L (ref 15–37)
BANDED NEUTROPHILS RELATIVE PERCENT: 2 % (ref 0–7)
BASOPHILS ABSOLUTE: 0 K/UL (ref 0–0.2)
BASOPHILS RELATIVE PERCENT: 0 %
BILIRUB SERPL-MCNC: 13.6 MG/DL (ref 0–1)
BILIRUBIN DIRECT: 7.6 MG/DL (ref 0–0.3)
BILIRUBIN, INDIRECT: 6 MG/DL (ref 0–1)
BUN BLDV-MCNC: 27 MG/DL (ref 7–20)
CALCIUM SERPL-MCNC: 8.1 MG/DL (ref 8.3–10.6)
CELL COUNT FLUID TYPE: NORMAL
CHLORIDE BLD-SCNC: 101 MMOL/L (ref 99–110)
CLOT EVALUATION: NORMAL
CO2: 25 MMOL/L (ref 21–32)
COLOR FLUID: YELLOW
CREAT SERPL-MCNC: 1.1 MG/DL (ref 0.8–1.3)
D DIMER: 2527 NG/ML DDU (ref 0–229)
EOSINOPHILS ABSOLUTE: 0.5 K/UL (ref 0–0.6)
EOSINOPHILS RELATIVE PERCENT: 3 %
FIBRINOGEN: 152 MG/DL (ref 200–397)
GFR AFRICAN AMERICAN: >60
GFR NON-AFRICAN AMERICAN: >60
GLOBULIN: 3.5 G/DL
GLUCOSE BLD-MCNC: 104 MG/DL (ref 70–99)
HCT VFR BLD CALC: 27.4 % (ref 40.5–52.5)
HEMATOLOGY PATH CONSULT: NO
HEMOGLOBIN: 9.5 G/DL (ref 13.5–17.5)
HYPOCHROMIA: ABNORMAL
INR BLD: 1.98 (ref 0.86–1.14)
LYMPHOCYTES ABSOLUTE: 2.1 K/UL (ref 1–5.1)
LYMPHOCYTES RELATIVE PERCENT: 12 %
LYMPHOCYTES, BODY FLUID: 3 %
MACROPHAGE FLUID: 12 %
MCH RBC QN AUTO: 38.5 PG (ref 26–34)
MCHC RBC AUTO-ENTMCNC: 34.6 G/DL (ref 31–36)
MCV RBC AUTO: 111.5 FL (ref 80–100)
MONOCYTES ABSOLUTE: 1.4 K/UL (ref 0–1.3)
MONOCYTES RELATIVE PERCENT: 8 %
NEUTROPHIL, FLUID: 85 %
NEUTROPHILS ABSOLUTE: 13.6 K/UL (ref 1.7–7.7)
NEUTROPHILS RELATIVE PERCENT: 75 %
NUCLEATED CELLS FLUID: 491 /CUMM
NUMBER OF CELLS COUNTED FLUID: 100
OVALOCYTES: ABNORMAL
PDW BLD-RTO: 16.2 % (ref 12.4–15.4)
PLATELET # BLD: 90 K/UL (ref 135–450)
PLATELET SLIDE REVIEW: ABNORMAL
PMV BLD AUTO: 9.1 FL (ref 5–10.5)
POLYCHROMASIA: ABNORMAL
POTASSIUM REFLEX MAGNESIUM: 3.7 MMOL/L (ref 3.5–5.1)
PROTHROMBIN TIME: 23.1 SEC (ref 10–13.2)
RBC # BLD: 2.46 M/UL (ref 4.2–5.9)
RBC FLUID: 5200 /CUMM
SLIDE REVIEW: ABNORMAL
SODIUM BLD-SCNC: 135 MMOL/L (ref 136–145)
TOTAL PROTEIN: 5.9 G/DL (ref 6.4–8.2)
TOXIC GRANULATION: PRESENT
VOLUME: 3 ML
WBC # BLD: 17.6 K/UL (ref 4–11)

## 2020-08-24 PROCEDURE — 49083 ABD PARACENTESIS W/IMAGING: CPT

## 2020-08-24 PROCEDURE — 0W9G3ZZ DRAINAGE OF PERITONEAL CAVITY, PERCUTANEOUS APPROACH: ICD-10-PCS | Performed by: RADIOLOGY

## 2020-08-24 PROCEDURE — 2500000003 HC RX 250 WO HCPCS: Performed by: INTERNAL MEDICINE

## 2020-08-24 PROCEDURE — 6360000002 HC RX W HCPCS: Performed by: HOSPITALIST

## 2020-08-24 PROCEDURE — 6370000000 HC RX 637 (ALT 250 FOR IP): Performed by: INTERNAL MEDICINE

## 2020-08-24 PROCEDURE — 87015 SPECIMEN INFECT AGNT CONCNTJ: CPT

## 2020-08-24 PROCEDURE — 36415 COLL VENOUS BLD VENIPUNCTURE: CPT

## 2020-08-24 PROCEDURE — C1729 CATH, DRAINAGE: HCPCS

## 2020-08-24 PROCEDURE — 6360000002 HC RX W HCPCS: Performed by: INTERNAL MEDICINE

## 2020-08-24 PROCEDURE — 85730 THROMBOPLASTIN TIME PARTIAL: CPT

## 2020-08-24 PROCEDURE — 6360000002 HC RX W HCPCS: Performed by: PHYSICIAN ASSISTANT

## 2020-08-24 PROCEDURE — 85384 FIBRINOGEN ACTIVITY: CPT

## 2020-08-24 PROCEDURE — 94640 AIRWAY INHALATION TREATMENT: CPT

## 2020-08-24 PROCEDURE — 99222 1ST HOSP IP/OBS MODERATE 55: CPT | Performed by: INTERNAL MEDICINE

## 2020-08-24 PROCEDURE — 85025 COMPLETE CBC W/AUTO DIFF WBC: CPT

## 2020-08-24 PROCEDURE — P9047 ALBUMIN (HUMAN), 25%, 50ML: HCPCS | Performed by: PHYSICIAN ASSISTANT

## 2020-08-24 PROCEDURE — 87205 SMEAR GRAM STAIN: CPT

## 2020-08-24 PROCEDURE — 2580000003 HC RX 258: Performed by: HOSPITALIST

## 2020-08-24 PROCEDURE — 2060000000 HC ICU INTERMEDIATE R&B

## 2020-08-24 PROCEDURE — 87070 CULTURE OTHR SPECIMN AEROBIC: CPT

## 2020-08-24 PROCEDURE — 85379 FIBRIN DEGRADATION QUANT: CPT

## 2020-08-24 PROCEDURE — 80053 COMPREHEN METABOLIC PANEL: CPT

## 2020-08-24 PROCEDURE — 85610 PROTHROMBIN TIME: CPT

## 2020-08-24 PROCEDURE — 94761 N-INVAS EAR/PLS OXIMETRY MLT: CPT

## 2020-08-24 RX ORDER — ALBUMIN (HUMAN) 12.5 G/50ML
135 SOLUTION INTRAVENOUS ONCE
Status: COMPLETED | OUTPATIENT
Start: 2020-08-24 | End: 2020-08-24

## 2020-08-24 RX ORDER — POLYETHYLENE GLYCOL 3350 17 G/17G
17 POWDER, FOR SOLUTION ORAL DAILY
Status: DISCONTINUED | OUTPATIENT
Start: 2020-08-25 | End: 2020-08-25

## 2020-08-24 RX ORDER — ALBUMIN (HUMAN) 12.5 G/50ML
SOLUTION INTRAVENOUS
Status: DISCONTINUED
Start: 2020-08-24 | End: 2020-08-24 | Stop reason: WASHOUT

## 2020-08-24 RX ORDER — LIDOCAINE HYDROCHLORIDE 10 MG/ML
10 INJECTION, SOLUTION EPIDURAL; INFILTRATION; INTRACAUDAL; PERINEURAL ONCE
Status: COMPLETED | OUTPATIENT
Start: 2020-08-24 | End: 2020-08-24

## 2020-08-24 RX ORDER — SIMETHICONE 80 MG
80 TABLET,CHEWABLE ORAL EVERY 6 HOURS PRN
Status: DISCONTINUED | OUTPATIENT
Start: 2020-08-24 | End: 2020-08-29 | Stop reason: HOSPADM

## 2020-08-24 RX ORDER — POLYETHYLENE GLYCOL 3350 17 G/17G
17 POWDER, FOR SOLUTION ORAL 2 TIMES DAILY
Status: DISPENSED | OUTPATIENT
Start: 2020-08-24 | End: 2020-08-25

## 2020-08-24 RX ADMIN — IPRATROPIUM BROMIDE AND ALBUTEROL SULFATE 1 AMPULE: .5; 3 SOLUTION RESPIRATORY (INHALATION) at 11:15

## 2020-08-24 RX ADMIN — CEFEPIME HYDROCHLORIDE 2 G: 2 INJECTION, POWDER, FOR SOLUTION INTRAVENOUS at 15:21

## 2020-08-24 RX ADMIN — IPRATROPIUM BROMIDE AND ALBUTEROL SULFATE 1 AMPULE: .5; 3 SOLUTION RESPIRATORY (INHALATION) at 21:03

## 2020-08-24 RX ADMIN — SPIRONOLACTONE 25 MG: 25 TABLET ORAL at 10:51

## 2020-08-24 RX ADMIN — FUROSEMIDE 40 MG: 40 TABLET ORAL at 10:51

## 2020-08-24 RX ADMIN — ALBUMIN (HUMAN) 135 G: 0.25 INJECTION, SOLUTION INTRAVENOUS at 18:34

## 2020-08-24 RX ADMIN — HYDROMORPHONE HYDROCHLORIDE 1 MG: 1 INJECTION, SOLUTION INTRAMUSCULAR; INTRAVENOUS; SUBCUTANEOUS at 09:09

## 2020-08-24 RX ADMIN — VANCOMYCIN HYDROCHLORIDE 1250 MG: 10 INJECTION, POWDER, LYOPHILIZED, FOR SOLUTION INTRAVENOUS at 20:48

## 2020-08-24 RX ADMIN — LIDOCAINE HYDROCHLORIDE 10 ML: 10 INJECTION, SOLUTION EPIDURAL; INFILTRATION; INTRACAUDAL; PERINEURAL at 10:13

## 2020-08-24 RX ADMIN — HYDROMORPHONE HYDROCHLORIDE 1 MG: 1 INJECTION, SOLUTION INTRAMUSCULAR; INTRAVENOUS; SUBCUTANEOUS at 01:01

## 2020-08-24 RX ADMIN — CEFEPIME HYDROCHLORIDE 2 G: 2 INJECTION, POWDER, FOR SOLUTION INTRAVENOUS at 22:20

## 2020-08-24 RX ADMIN — Medication 10 ML: at 10:52

## 2020-08-24 RX ADMIN — CEFEPIME HYDROCHLORIDE 2 G: 2 INJECTION, POWDER, FOR SOLUTION INTRAVENOUS at 05:39

## 2020-08-24 RX ADMIN — HYDROMORPHONE HYDROCHLORIDE 1 MG: 1 INJECTION, SOLUTION INTRAMUSCULAR; INTRAVENOUS; SUBCUTANEOUS at 17:30

## 2020-08-24 RX ADMIN — IPRATROPIUM BROMIDE AND ALBUTEROL SULFATE 1 AMPULE: .5; 3 SOLUTION RESPIRATORY (INHALATION) at 08:22

## 2020-08-24 RX ADMIN — HYDROMORPHONE HYDROCHLORIDE 1 MG: 1 INJECTION, SOLUTION INTRAMUSCULAR; INTRAVENOUS; SUBCUTANEOUS at 13:25

## 2020-08-24 RX ADMIN — FUROSEMIDE 40 MG: 40 TABLET ORAL at 18:46

## 2020-08-24 RX ADMIN — Medication 10 ML: at 20:47

## 2020-08-24 RX ADMIN — SIMETHICONE 80 MG: 80 TABLET, CHEWABLE ORAL at 17:30

## 2020-08-24 RX ADMIN — HYDROMORPHONE HYDROCHLORIDE 1 MG: 1 INJECTION, SOLUTION INTRAMUSCULAR; INTRAVENOUS; SUBCUTANEOUS at 21:36

## 2020-08-24 RX ADMIN — HYDROMORPHONE HYDROCHLORIDE 1 MG: 1 INJECTION, SOLUTION INTRAMUSCULAR; INTRAVENOUS; SUBCUTANEOUS at 05:09

## 2020-08-24 RX ADMIN — METRONIDAZOLE 500 MG: 500 INJECTION, SOLUTION INTRAVENOUS at 23:29

## 2020-08-24 ASSESSMENT — PAIN DESCRIPTION - LOCATION
LOCATION: GENERALIZED

## 2020-08-24 ASSESSMENT — PAIN SCALES - GENERAL
PAINLEVEL_OUTOF10: 10
PAINLEVEL_OUTOF10: 10
PAINLEVEL_OUTOF10: 5
PAINLEVEL_OUTOF10: 7
PAINLEVEL_OUTOF10: 0
PAINLEVEL_OUTOF10: 10
PAINLEVEL_OUTOF10: 0
PAINLEVEL_OUTOF10: 6
PAINLEVEL_OUTOF10: 8
PAINLEVEL_OUTOF10: 0
PAINLEVEL_OUTOF10: 10
PAINLEVEL_OUTOF10: 10

## 2020-08-24 ASSESSMENT — PAIN DESCRIPTION - PROGRESSION: CLINICAL_PROGRESSION: NOT CHANGED

## 2020-08-24 ASSESSMENT — PAIN DESCRIPTION - PAIN TYPE
TYPE: ACUTE PAIN

## 2020-08-24 ASSESSMENT — PAIN DESCRIPTION - DESCRIPTORS
DESCRIPTORS: CONSTANT
DESCRIPTORS: CONSTANT

## 2020-08-24 ASSESSMENT — PAIN - FUNCTIONAL ASSESSMENT: PAIN_FUNCTIONAL_ASSESSMENT: PREVENTS OR INTERFERES SOME ACTIVE ACTIVITIES AND ADLS

## 2020-08-24 ASSESSMENT — PAIN DESCRIPTION - FREQUENCY
FREQUENCY: CONTINUOUS
FREQUENCY: CONTINUOUS

## 2020-08-24 ASSESSMENT — PAIN DESCRIPTION - ONSET
ONSET: ON-GOING
ONSET: ON-GOING

## 2020-08-24 NOTE — PROGRESS NOTES
4500ml of fluid removed  Spoke to patients CHANG lopez and advised her the amount of fluid removed and that patient was returning to the floor.

## 2020-08-24 NOTE — CARE COORDINATION
SW met with patient regarding his discharge plan. Patient would like to discharge to home with [facility]. The Plan for Transition of Care is related to the following treatment goals: To increase the patient's independence. The Patient was provided with a choice of provider and agrees   with the discharge plan. [x] Yes [] No    Freedom of choice list was provided with basic dialogue that supports the patient's individualized plan of care/goals, treatment preferences and shares the quality data associated with the providers. [x] Yes  No    SW referred patient to General acute hospital. PT also recommending DME for patient.  SW referred patient to [facility].    Electronically signed by BUNNY Cobian on 8/24/2020 at 3:37 PM

## 2020-08-24 NOTE — ACP (ADVANCE CARE PLANNING)
ADVANCED CARE PLANNING    Name:Jay Roque       :  1955              MRN:  [id number]      Purpose of Encounter: Advanced care planning in light of liver cirrhosis, progressive liver failure, sepsis. Parties in attendance: :Joya Hall MD, no family present. Jarad Husbands, RN present in the room. Decisional Capacity:Yes    Diagnosis: Principal Problem:    Sepsis ([address]  Active Problems:    Cirrhosis ([address]    Anasarca    HCAP (healthcare-associated pneumonia)    KACEY (acute kidney injury) ([address]    Anemia    Thrombocytopenia (HCC)    Coagulopathy (HCC)    Alcoholic cirrhosis (HCC)    Hyponatremia  Resolved Problems:    * No resolved hospital problems. *    Patients Medical Story: 55-year-old male with alcoholic cirrhosis complicated by ascites and spontaneous bacterial peritonitis admitted to hospital for anasarca and sepsis. Goals of Care Determinations: Patient wishes to focus on patient would like to continue treatment for his conditions and take 1 day at a time. He would not want any resuscitative measures including CPR, shocks, pressors,  Or endotracheal intubation in case his heart stops or he is unable to breathe. Patient would like to have his sister designated as his POA. Plan: Will notify No primary care provider on file. of change in care plan. Will look at further interventions as needed. Code Status: At this time patient wishes to be DNR-CCA  Time Spent with Patient: 20 minutes      Electronically signed by Neli Tamez MD on 2020 at 9:06 AM  Thank you No primary care provider on file. for the opportunity to be involved in this patient's care.

## 2020-08-24 NOTE — PROGRESS NOTES
Physical Therapy  Rita Diaz    Attempted to see pt for PT treatment. Patient currently off the floor for paracentesis. Will hold therapy at this time and will follow up with pt in pm as schedule permits.  Thanks, Kira Estrada, [address] [facility], DPT [id number]

## 2020-08-24 NOTE — DISCHARGE INSTR - COC
Continuity of Care Form    Patient Name: Charles Walton   :  1955  MRN:  [id number]    Admit date:  2020  Discharge date:  20    Code Status Order: DNR-CCA   Advance Directives:   [facility] Documentation     Date/Time Healthcare Directive Type of Healthcare Directive Copy in [address] Agent's Name Healthcare Agent's Phone Number    20 1205  No, patient does not have an advance directive for healthcare treatment -- -- -- -- --    20 0101  No, patient does not have an advance directive for healthcare treatment -- -- -- -- --          Admitting Physician:  Mamta Ramsay MD  PCP: No primary care provider on file. Discharging Nurse: Tanesha Gaines Unit/Room#: 2RP-6862/5040-18  Discharging Unit Phone Number: [phone]    Emergency Contact:   Extended Emergency Contact Information  Primary Emergency Contact: [facility]  Address: [address] [place] [address] Phone: [phone]  Mobile Phone: [phone]  Relation: Other    Past Surgical History:  History reviewed. No pertinent surgical history. Immunization History: There is no immunization history on file for this patient.     Active Problems:  Patient Active Problem List   Diagnosis Code    Cirrhosis ([facility] K74.60    Anasarca R60.1    HCAP (healthcare-associated pneumonia) J18.9    Sepsis ([facility] A41.9    KACEY (acute kidney injury) ([facility] N17.9    Anemia D64.9    Thrombocytopenia ([address] D69.6    Coagulopathy ([facility] [facility] J32.0    Alcoholic cirrhosis ([facility] U22.86    Hyponatremia E87.1       Isolation/Infection:   Isolation          No Isolation        Patient Infection Status     Infection Onset Added Last Indicated Last Indicated By Review Planned Expiration Resolved Resolved By    None active    Resolved    COVID-19 Rule Out 20 COVID-19 (Ordered)   20 Rule-Out Test Resulted 60 day period    -Medication Reconciliation    -PT/OT/Speech evaluations in home within 24-48 hours of discharge; including  -DME and home safety    -Frontload therapy 5 days, then 3x a week    -OT to evaluate if patient has [address] needs for personal care    - evaluation within 24-48 hours, includes evaluation of resources   and insurance to determine AL, [place], LTC, and Medicaid options    -PCP Visit scheduled within three to seven days of discharge       Patient's personal belongings (please select all that are sent with patient):  Dentures upper and lower, Goldy DOWNING SIGNATURE:  Electronically signed by Veronica Granado RN on 8/29/20 at 2:45 PM EDT    CASE MANAGEMENT/SOCIAL WORK SECTION    Inpatient Status Date: ***    Readmission Risk Assessment Score:  Readmission Risk              Risk of Unplanned Readmission:        19           Discharging to Facility/ Agency   Name: Caleb Lindo will call for Appointment  Phone: [phone]  Fax: [phone]    Dialysis Facility (if applicable)   · Name:  · Address:  · Dialysis Schedule:  · Phone:  · Fax:    / signature: {Esignature:[id number]}    PHYSICIAN SECTION    Prognosis: {Prognosis:2172867470}    Condition at Discharge: Brendon8 Dhara Mir Patient Condition:886538764}    Rehab Potential (if transferring to Rehab): {Prognosis:8795951508}    Recommended Labs or Other Treatments After Discharge: ***    Physician Certification: I certify the above information and transfer of Radha Jay  is necessary for the continuing treatment of the diagnosis listed and that he requires {Admit to Appropriate Level of Care:56970} for {GREATER/LESS:255828868} 30 days.      Update Admission H&P: {CHP DME Changes in [facility]AD[facility]:136551877}    PHYSICIAN SIGNATURE:  {Esignature:[id number]}

## 2020-08-24 NOTE — PROGRESS NOTES
Speech Language Pathology  Attempt   Shiraz Romero 1955     Attempted to see pt for dysphagia therapy follow-up. Pt off the floor at time of attempt. Will re-attempt to follow-up as therapy schedule allows.     Thanks,  Keya Calix, [facility]  Speech Language Pathologist

## 2020-08-24 NOTE — CONSULTS
Infectious Diseases   Consult Note      Reason for Consult:  \"sepsis\"  Requesting Physician:  Anika Muller MD       Date of Admission: 8/17/2020  Subjective:   CHIEF COMPLAINT:  None given      HPI:    Johnny Holm is a 24CCI with history of cirrhosis due to alcohol abuse and chronic HCV infection; with ascites requiring regular paracentesis                ED 8/17/20 - cc myalgia, fatigue, LE swelling, loss of taste  Initial labs notable for AoCKD    Admitted for management of sepsis, suspected pneumonia     Without fever, on room air. WBC rising, today is 17.6 with 2% bands on differential.    Platelets rising, today 90. KACEY has resolved   TB continues to rise, today is 13.6. As last check on 8/22/20, the procal was low at 0.25      Current abx:  vanc 1250 q24, started 8/17/20   Cefepime 2g q8, started 8/17/20        Past Surgical History:       Diagnosis Date    Cirrhosis of liver ([facility]     aug 2018     History reviewed. No pertinent surgical history. Social History:    TOBACCO:   reports that he has been smoking. He has been smoking about 0.25 packs per day. He has never used smokeless tobacco.  ETOH:   reports previous alcohol use. There is no history of illicit drug use or other significant epidemiologic exposures.     Family History:       Problem Relation Age of Onset    High Blood Pressure Mother        Current Medications:    Current Facility-Administered Medications: cefepime (MAXIPIME) 2 g IVPB minibag, 2 g, Intravenous, Q8H  vancomycin (VANCOCIN) 1,250 mg in dextrose 5 % 250 mL IVPB, 1,250 mg, Intravenous, Q24H  potassium chloride (KLOR-CON M) extended release tablet 40 mEq, 40 mEq, Oral, PRN **OR** potassium bicarb-citric acid (EFFER-K) effervescent tablet 40 mEq, 40 mEq, Oral, PRN **OR** potassium chloride 10 mEq/100 mL IVPB (Peripheral Line), 10 mEq, Intravenous, PRN  furosemide (LASIX) tablet 40 mg, 40 mg, Oral, BID  spironolactone (ALDACTONE) tablet 25 mg, 25 mg, Oral, Daily  calcium carbonate (TUMS) chewable tablet 500 mg, 500 mg, Oral, TID PRN  HYDROmorphone HCl PF (DILAUDID) injection 1 mg, 1 mg, Intravenous, Q4H PRN  ipratropium-albuterol (DUONEB) nebulizer solution 1 ampule, 1 ampule, Inhalation, Q4H WA  sodium chloride flush 0.9 % injection 10 mL, 10 mL, Intravenous, 2 times per day  sodium chloride flush 0.9 % injection 10 mL, 10 mL, Intravenous, PRN  acetaminophen (TYLENOL) tablet 650 mg, 650 mg, Oral, Q6H PRN **OR** acetaminophen (TYLENOL) suppository 650 mg, 650 mg, Rectal, Q6H PRN  senna (SENOKOT) tablet 8.6 mg, 1 tablet, Oral, Daily PRN  ondansetron (ZOFRAN-ODT) disintegrating tablet 4 mg, 4 mg, Oral, Q8H PRN **OR** ondansetron (ZOFRAN) injection 4 mg, 4 mg, Intravenous, Q6H PRN  perflutren lipid microspheres (DEFINITY) injection 1.65 mg, 1.5 mL, Intravenous, ONCE PRN      No Known Allergies       REVIEW OF SYSTEMS:    CONSTITUTIONAL:  negative for fevers, chills, diaphoresis, appetite change, fatigue, night sweats and unexpected weight change.    EYES:  negative for blurred vision, eye discharge, visual disturbance and icterus  HEENT:  negative for hearing loss, tinnitus, ear drainage, sinus pressure, nasal congestion, epistaxis and snoring  RESPIRATORY:  No cough, shortness of breath, hemoptysis  CARDIOVASCULAR:  negative for chest pain, palpitations, exertional chest pressure/discomfort, edema, syncope  GASTROINTESTINAL:  negative for nausea, vomiting, diarrhea, constipation  GENITOURINARY:  negative for frequency, dysuria, urinary incontinence, decreased urine volume, and hematuria  HEMATOLOGIC/LYMPHATIC:  negative for easy bruising, bleeding and lymphadenopathy  ALLERGIC/IMMUNOLOGIC:  negative for recurrent infections, angioedema, anaphylaxis and drug reactions  ENDOCRINE:  negative for weight changes and diabetic symptoms including polyuria, polydipsia and polyphagia  MUSCULOSKELETAL:  negative for acute joint pain, joint swelling, decreased range of motion and CREATININE 1.4* 1.3 1.1   CALCIUM 7.7* 7.8* 8.1*   GLUCOSE 131* 145* 104*        Cultures:   8/17 COVID PCR neg    UC neg   BC x2 neg    UA   8/19 Legionella ag neg    Ascites culture neg, no organisms on GS ()  8/20 Expectorated sputum culture moderate growth MSSA; GS 1+gpc, +yeast  Staphylococcus aureus   Antibiotic  Interpretation  CHRISTOPHER  Status     ceFAZolin  Sensitive  <=4  mcg/mL      clindamycin  Sensitive  <=0.5  mcg/mL      erythromycin  Sensitive  <=0.5  mcg/mL      oxacillin  Sensitive  0.5  mcg/mL      tetracycline  Sensitive  <=4  mcg/mL      trimethoprim-sulfamethoxazole  Sensitive  <=0.5/9.5  mcg/mL        8/23 BC x1 NGTD         Radiology Review:  All pertinent images / reports were reviewed as a part of this visit. GB US 8/19/20   Impression    Heterogeneous nodular appearance of the liver compatible with known cirrhosis.         Gallbladder and common duct appear grossly unremarkable in appearance.         Moderate amount of ascites.       CXR 8/22/20   FINDINGS:    No significant change in left-sided pleural effusion/pleural thickening with    rounded airspace disease.  Right lung grossly clear with a sharp costophrenic    angle.  Heart size appears stable          Assessment:     Patient Active Problem List   Diagnosis    Cirrhosis ([address]    Anasarca    HCAP (healthcare-associated pneumonia)    Sepsis ([address]    KACEY (acute kidney injury) ([address]    Anemia    Thrombocytopenia ([address]    Coagulopathy ([address]    Alcoholic cirrhosis ([address]    Hyponatremia       Sergio Puente is a 46PKR who is evaluated for the following:    Alcoholic cirrhosis with ascites    Admitted 8/17/20 with sepsis picture, started on vanc and cefepime empirically    MSSA pneumonia     Probable SBP  Elevated ascites PMN count not reaching threshold for diagnosis of SBP on 8/18/20, but fluid collected after initiation of abx     WBC continues to rise despite broad abx, vanc and cefepime since admission, but not associated with fever  Could be reactive in the context of decompensated cirrhosis     HIV negative 8/2018     NKDA     Recs:  Await result of repeat paracentesis. If PMN count is higher, would recommend broadening abx further to meropenem and antifungal  If the ascites PMN is not higher, would be more inclined to think that the leukocyotsis is reactive  A repeat BC is pending - will follow-up on that result     We will follow       David Hameed M.D. Thank you for the opportunity to participate in the care of your patient.     Please do not hesitate to contact me:   [phone] office  [phone] mobile

## 2020-08-24 NOTE — PROGRESS NOTES
[facility]    8/23/2020 11:32 PM        Name: Shiraz Romero . Admitted: 8/17/2020  Primary Care Provider: No primary care provider on file. (Tel: None)    Brief Course:  58 yo M with alcoholic cirrhosis, non compliance came to ER with complaints of anasarca. Admitted as inpatient for HCAP, sepsis and KACEY. COVID 19 negative on 8/18 from ED. GI and Renal consulted. Underwent 3.7 L removal of ascites by IR on 8/19. CC: BL LE edema, scrotal swelling, loss of appetite    Subjective:  .   Offers no complaint today  No chest pain no shortness of breath  No abdominal pain    Reviewed interval ancillary notes    Current Medications  cefepime (MAXIPIME) 2 g IVPB minibag, Q8H  vancomycin (VANCOCIN) 1,250 mg in dextrose 5 % 250 mL IVPB, Q24H  potassium chloride (KLOR-CON M) extended release tablet 40 mEq, PRN    Or  potassium bicarb-citric acid (EFFER-K) effervescent tablet 40 mEq, PRN    Or  potassium chloride 10 mEq/100 mL IVPB (Peripheral Line), PRN  furosemide (LASIX) tablet 40 mg, BID  spironolactone (ALDACTONE) tablet 25 mg, Daily  calcium carbonate (TUMS) chewable tablet 500 mg, TID PRN  HYDROmorphone HCl PF (DILAUDID) injection 1 mg, Q4H PRN  ipratropium-albuterol (DUONEB) nebulizer solution 1 ampule, Q4H WA  sodium chloride flush 0.9 % injection 10 mL, 2 times per day  sodium chloride flush 0.9 % injection 10 mL, PRN  acetaminophen (TYLENOL) tablet 650 mg, Q6H PRN    Or  acetaminophen (TYLENOL) suppository 650 mg, Q6H PRN  senna (SENOKOT) tablet 8.6 mg, Daily PRN  ondansetron (ZOFRAN-ODT) disintegrating tablet 4 mg, Q8H PRN    Or  ondansetron (ZOFRAN) injection 4 mg, Q6H PRN  perflutren lipid microspheres (DEFINITY) injection 1.65 mg, ONCE PRN        Objective:  /74   Pulse 82   Temp 98.2 °F (36.8 °C) (Oral)   Resp 16   Ht 6' 2\" (1.88 m)   Wt 197 lb 14.4 oz (89.8 kg)   SpO2 96% BMI 25.41 kg/m²     Intake/Output Summary (Last 24 hours) at 8/23/2020 2332  Last data filed at 8/23/2020 2047  Gross per 24 hour   Intake 360 ml   Output 725 ml   Net -365 ml      Wt Readings from Last 3 Encounters:   08/23/20 197 lb 14.4 oz (89.8 kg)   08/13/20 229 lb 6.4 oz (104.1 kg)   08/06/20 228 lb (103.4 kg)       General appearance:  Appears comfortable, chronically ill appearing  Eyes: Sclera clear. Pupils equal.  ENT: Moist oral mucosa. Trachea midline, no adenopathy. Bitemporal wasting  Cardiovascular: Regular rhythm, normal S1, S2. No murmur. 4+ BL LE edema  Respiratory: Not using accessory muscles. Good inspiratory effort. Clear to auscultation bilaterally, no wheeze or crackles. GI: Abdomen soft, no tenderness, distended, normal bowel sounds, ascites  Musculoskeletal: No cyanosis in digits, neck supple  Neurology: Grossly intact. No speech or motor deficits  Psych: Normal affect. Alert and oriented in time, place and person  Skin: Warm, dry, normal turgor  Extremity exam shows brisk capillary refill. Peripheral pulses are palpable in lower extremities     Labs and Tests:  CBC:   Recent Labs     08/21/20  0506 08/22/20  0444 08/23/20  0521   WBC 9.5 10.4 14.6*   HGB 8.9* 9.1* 9.4*   PLT 56* 69* 78*     BMP:    Recent Labs     08/21/20  0506 08/22/20  0444 08/23/20  0521   * 133* 134*   K 3.4* 3.7 3.6   CL 99 101 102   CO2 24 23 25   BUN 34* 31* 27*   CREATININE 1.6* 1.4* 1.3   GLUCOSE 154* 131* 145*     Hepatic:   Recent Labs     08/21/20  0506 08/22/20  0444 08/23/20  0521   AST 36 45* 43*   ALT 19 22 24   BILITOT 5.1* 6.8* 9.1*   ALKPHOS 66 71 70     XR CHEST PORTABLE   Final Result   Stable appearance of left-sided pleuroparenchymal disease         IR US GUIDED PARACENTESIS   Final Result   Successful ultrasound guided paracentesis. US GALLBLADDER RUQ   Final Result   Heterogeneous nodular appearance of the liver compatible with known cirrhosis.       Gallbladder and common duct appear grossly unremarkable in appearance. Moderate amount of ascites. XR CHEST PORTABLE   Final Result   Increasing pleuroparenchymal disease on the left. US GUIDED PARACENTESIS    (Results Pending)         Problem List  Principal Problem:    Sepsis ([facility]  Active Problems:    Cirrhosis ([facility]    Anasarca    HCAP (healthcare-associated pneumonia)    KACEY (acute kidney injury) ([facility] [facility]    Anemia    Thrombocytopenia (HCC)    Coagulopathy (HCC)    Alcoholic cirrhosis ([facility]    Hyponatremia  Resolved Problems:    * No resolved hospital problems. *       Assessment & Plan:  Continue cefepime and Vanco for HCAP  Continue Lasix 40 mg IV twice daily  Creatinine is improving 1.6 today  GI consult  Nephrology consult  COVID-19 is negative on August 18  PT/OT consult  Palliative care on board      IV Access: Peripheral  Stack: No  Diet: DIET LOW SODIUM 2 GM; Dysphagia Soft and Bite-Sized; 1200 ml  Code:Full Code  DVT PPX SCD  Disposition Home with home PT/OT/VNS/HHA    Discussed with patient, nursing.        Geronimo Guardado MD   8/23/2020 11:32 PM

## 2020-08-24 NOTE — PROGRESS NOTES
[facility]    8/24/2020 1:19 PM        Name: Leidy Rodriguez . Admitted: 8/17/2020  Primary Care Provider: No primary care provider on file. (Tel: None)                        Hospital course:   58 yo M with alcoholic cirrhosis, non compliance came to ER with complaints of anasarca.  Admitted as inpatient for HCAP, sepsis and KACEY.  COVID 19 negative on 8/18 from ED.   GI and Renal consulted.  Underwent 3.7 L removal of ascites by IR on 8/19    Subjective: Patient seen and examined. Complaints of abdominal pain. Goals of care discussed, see separate ACP note    No acute events overnight. Resting well. Pain control. Diet ok. Labs reviewed  Denies any chest pain sob.      Reviewed interval ancillary notes    Current Medications  polyethylene glycol (GLYCOLAX) packet 17 g, BID  [START ON 8/25/2020] polyethylene glycol (GLYCOLAX) packet 17 g, Daily  albumin human 25 % IV solution 135 g, Once  cefepime (MAXIPIME) 2 g IVPB minibag, Q8H  vancomycin (VANCOCIN) 1,250 mg in dextrose 5 % 250 mL IVPB, Q24H  potassium chloride (KLOR-CON M) extended release tablet 40 mEq, PRN    Or  potassium bicarb-citric acid (EFFER-K) effervescent tablet 40 mEq, PRN    Or  potassium chloride 10 mEq/100 mL IVPB (Peripheral Line), PRN  furosemide (LASIX) tablet 40 mg, BID  spironolactone (ALDACTONE) tablet 25 mg, Daily  calcium carbonate (TUMS) chewable tablet 500 mg, TID PRN  HYDROmorphone HCl PF (DILAUDID) injection 1 mg, Q4H PRN  ipratropium-albuterol (DUONEB) nebulizer solution 1 ampule, Q4H WA  sodium chloride flush 0.9 % injection 10 mL, 2 times per day  sodium chloride flush 0.9 % injection 10 mL, PRN  acetaminophen (TYLENOL) tablet 650 mg, Q6H PRN    Or  acetaminophen (TYLENOL) suppository 650 mg, Q6H PRN  senna (SENOKOT) tablet 8.6 mg, Daily PRN  ondansetron (ZOFRAN-ODT) disintegrating tablet 4 mg, Q8H PRN    Or  ondansetron (ZOFRAN) injection 4 mg, Q6H PRN  perflutren lipid microspheres (DEFINITY) injection 1.65 mg, ONCE PRN        Objective:  /68   Pulse 93   Temp 98 °F (36.7 °C) (Oral)   Resp 15   Ht 6' 2\" (1.88 m)   Wt 199 lb 11.2 oz (90.6 kg)   SpO2 97%   BMI 25.64 kg/m²     Intake/Output Summary (Last 24 hours) at 8/24/2020 1319  Last data filed at 8/24/2020 0931  Gross per 24 hour   Intake 120 ml   Output 800 ml   Net -680 ml      Wt Readings from Last 3 Encounters:   08/24/20 199 lb 11.2 oz (90.6 kg)   08/13/20 229 lb 6.4 oz (104.1 kg)   08/06/20 228 lb (103.4 kg)       General appearance: -American gentleman appears comfortable  Eyes: Sclera clear. Pupils equal.  ENT: Moist oral mucosa. Trachea midline, no adenopathy, neck supple. Cardiovascular: Regular rhythm, normal S1, S2. No murmur. No edema in lower extremities  Respiratory: Not using accessory muscles. Good inspiratory effort. Clear to auscultation bilaterally, no wheeze or crackles. GI: Abdomen soft, some tenderness, distended due to ascites, normal bowel sounds  Musculoskeletal: No deformity, ROM WNL. Neurology: CN 2-12 grossly intact. No speech or motor deficits  Psych: Normal affect.  Alert and oriented in time, place and person  Skin: Warm, dry, normal turgor  Extremities nonpitting edema noted  Labs and Tests:  CBC:   Recent Labs     08/22/20  0444 08/23/20  0521 08/24/20  0504   WBC 10.4 14.6* 17.6*   HGB 9.1* 9.4* 9.5*   HCT 26.0* 27.0* 27.4*   .6* 110.8* 111.5*   PLT 69* 78* 90*     BMP:    Recent Labs     08/22/20  0444 08/23/20  0521 08/24/20  0504   * 134* 135*   K 3.7 3.6 3.7    102 101   CO2 23 25 25   BUN 31* 27* 27*   CREATININE 1.4* 1.3 1.1   GLUCOSE 131* 145* 104*     Hepatic:   Recent Labs     08/22/20  0444 08/23/20  0521 08/24/20  0504   AST 45* 43* 48*   ALT 22 24 26   BILITOT 6.8* 9.1* 13.6*   ALKPHOS 71 70 73         Problem List  Principal Problem: Sepsis ([facility]  Active Problems:    Cirrhosis ([facility]    Anasarca    HCAP (healthcare-associated pneumonia)    KACEY (acute kidney injury) ([facility] [address]    Anemia    Thrombocytopenia (HCC)    Coagulopathy (HCC)    Alcoholic cirrhosis (HCC)    Hyponatremia  Resolved Problems:    * No resolved hospital problems. *     Assessment & Plan:      1.  Alcoholic cirrhosis: Gastroenterology service wants to repeat  another paracentesis today, thinking partially treated spontaneous bacterial peritonitis since neutrophil count to 228( close to 250), Aldactone added. 2.  Ascites status post paracentesis on 8/19 3.7 L fluid removed, continue Lasix 40 mg twice daily p.o. for anasarca, 3.2 L of negative balance since admission except ascites removal  3.  Patient is on empiric antibiotic treatment with cefepime vancomycin for possible H CAP, will check follow-up chest x-ray portable today and procalcitonin levels, however, white count continues to go up and patient is still with abdominal pain. We will consult infectious disease for recommendation on continued antibiotic therapy. 4.  Acute kidney injury on chronic kidney disease, creatinine 1.6--> 1.4--> 1.3 today stable, nephrology following  5.  PT OT evaluation done, home health care versus skilled nursing care facility placement.     Diet: DIET LOW SODIUM 2 GM;  Dysphagia Soft and Bite-Sized; 1200 ml  Code:DNR-CCA  DVT PPX lovenox       Chloé Fowler MD   8/24/2020 1:19 PM

## 2020-08-25 PROBLEM — R65.20 SEVERE SEPSIS (HCC): Status: ACTIVE | Noted: 2020-08-17

## 2020-08-25 PROBLEM — E44.0 MODERATE MALNUTRITION (HCC): Chronic | Status: ACTIVE | Noted: 2020-08-25

## 2020-08-25 LAB
A/G RATIO: 0.9 (ref 1.1–2.2)
ALBUMIN SERPL-MCNC: 2.7 G/DL (ref 3.4–5)
ALP BLD-CCNC: 59 U/L (ref 40–129)
ALT SERPL-CCNC: 21 U/L (ref 10–40)
ANION GAP SERPL CALCULATED.3IONS-SCNC: 10 MMOL/L (ref 3–16)
APTT: 59.8 SEC (ref 24.2–36.2)
AST SERPL-CCNC: 41 U/L (ref 15–37)
BASOPHILS ABSOLUTE: 0.1 K/UL (ref 0–0.2)
BASOPHILS RELATIVE PERCENT: 0.3 %
BILIRUB SERPL-MCNC: 14.2 MG/DL (ref 0–1)
BILIRUBIN DIRECT: 8.8 MG/DL (ref 0–0.3)
BILIRUBIN, INDIRECT: 5.4 MG/DL (ref 0–1)
BUN BLDV-MCNC: 28 MG/DL (ref 7–20)
CALCIUM SERPL-MCNC: 7.7 MG/DL (ref 8.3–10.6)
CHLORIDE BLD-SCNC: 99 MMOL/L (ref 99–110)
CO2: 24 MMOL/L (ref 21–32)
CREAT SERPL-MCNC: 1.5 MG/DL (ref 0.8–1.3)
D DIMER: 2329 NG/ML DDU (ref 0–229)
EOSINOPHILS ABSOLUTE: 0.2 K/UL (ref 0–0.6)
EOSINOPHILS RELATIVE PERCENT: 1.2 %
FIBRINOGEN: 131 MG/DL (ref 200–397)
GFR AFRICAN AMERICAN: 57
GFR NON-AFRICAN AMERICAN: 47
GLOBULIN: 2.9 G/DL
GLUCOSE BLD-MCNC: 90 MG/DL (ref 70–99)
HCT VFR BLD CALC: 25.3 % (ref 40.5–52.5)
HEMOGLOBIN: 8.8 G/DL (ref 13.5–17.5)
INR BLD: 3.1 (ref 0.86–1.14)
LYMPHOCYTES ABSOLUTE: 1.2 K/UL (ref 1–5.1)
LYMPHOCYTES RELATIVE PERCENT: 8 %
MCH RBC QN AUTO: 38.3 PG (ref 26–34)
MCHC RBC AUTO-ENTMCNC: 34.7 G/DL (ref 31–36)
MCV RBC AUTO: 110.3 FL (ref 80–100)
MONOCYTES ABSOLUTE: 3.2 K/UL (ref 0–1.3)
MONOCYTES RELATIVE PERCENT: 20.8 %
NEUTROPHILS ABSOLUTE: 10.6 K/UL (ref 1.7–7.7)
NEUTROPHILS RELATIVE PERCENT: 69.7 %
PDW BLD-RTO: 16.1 % (ref 12.4–15.4)
PLATELET # BLD: 81 K/UL (ref 135–450)
PMV BLD AUTO: 8.5 FL (ref 5–10.5)
POTASSIUM REFLEX MAGNESIUM: 3.9 MMOL/L (ref 3.5–5.1)
PROTHROMBIN TIME: 36.4 SEC (ref 10–13.2)
RBC # BLD: 2.3 M/UL (ref 4.2–5.9)
SODIUM BLD-SCNC: 133 MMOL/L (ref 136–145)
TOTAL PROTEIN: 5.6 G/DL (ref 6.4–8.2)
WBC # BLD: 15.2 K/UL (ref 4–11)

## 2020-08-25 PROCEDURE — 6360000002 HC RX W HCPCS: Performed by: INTERNAL MEDICINE

## 2020-08-25 PROCEDURE — 85025 COMPLETE CBC W/AUTO DIFF WBC: CPT

## 2020-08-25 PROCEDURE — 85384 FIBRINOGEN ACTIVITY: CPT

## 2020-08-25 PROCEDURE — 85730 THROMBOPLASTIN TIME PARTIAL: CPT

## 2020-08-25 PROCEDURE — 2580000003 HC RX 258: Performed by: INTERNAL MEDICINE

## 2020-08-25 PROCEDURE — 92526 ORAL FUNCTION THERAPY: CPT

## 2020-08-25 PROCEDURE — 94640 AIRWAY INHALATION TREATMENT: CPT

## 2020-08-25 PROCEDURE — 6370000000 HC RX 637 (ALT 250 FOR IP): Performed by: INTERNAL MEDICINE

## 2020-08-25 PROCEDURE — 6360000002 HC RX W HCPCS: Performed by: HOSPITALIST

## 2020-08-25 PROCEDURE — 99233 SBSQ HOSP IP/OBS HIGH 50: CPT | Performed by: INTERNAL MEDICINE

## 2020-08-25 PROCEDURE — 6370000000 HC RX 637 (ALT 250 FOR IP): Performed by: HOSPITALIST

## 2020-08-25 PROCEDURE — 85610 PROTHROMBIN TIME: CPT

## 2020-08-25 PROCEDURE — 36415 COLL VENOUS BLD VENIPUNCTURE: CPT

## 2020-08-25 PROCEDURE — 85379 FIBRIN DEGRADATION QUANT: CPT

## 2020-08-25 PROCEDURE — P9047 ALBUMIN (HUMAN), 25%, 50ML: HCPCS | Performed by: INTERNAL MEDICINE

## 2020-08-25 PROCEDURE — 94761 N-INVAS EAR/PLS OXIMETRY MLT: CPT

## 2020-08-25 PROCEDURE — 2500000003 HC RX 250 WO HCPCS: Performed by: INTERNAL MEDICINE

## 2020-08-25 PROCEDURE — 80053 COMPREHEN METABOLIC PANEL: CPT

## 2020-08-25 PROCEDURE — 6370000000 HC RX 637 (ALT 250 FOR IP): Performed by: PHYSICIAN ASSISTANT

## 2020-08-25 PROCEDURE — 2060000000 HC ICU INTERMEDIATE R&B

## 2020-08-25 PROCEDURE — 2580000003 HC RX 258: Performed by: HOSPITALIST

## 2020-08-25 RX ORDER — FLUCONAZOLE 2 MG/ML
200 INJECTION, SOLUTION INTRAVENOUS EVERY 24 HOURS
Status: DISCONTINUED | OUTPATIENT
Start: 2020-08-25 | End: 2020-08-28

## 2020-08-25 RX ORDER — POLYETHYLENE GLYCOL 3350 17 G/17G
17 POWDER, FOR SOLUTION ORAL 2 TIMES DAILY
Status: DISCONTINUED | OUTPATIENT
Start: 2020-08-25 | End: 2020-08-29 | Stop reason: HOSPADM

## 2020-08-25 RX ORDER — MIDODRINE HYDROCHLORIDE 5 MG/1
5 TABLET ORAL
Status: DISCONTINUED | OUTPATIENT
Start: 2020-08-25 | End: 2020-08-27

## 2020-08-25 RX ORDER — ALBUMIN (HUMAN) 12.5 G/50ML
12.5 SOLUTION INTRAVENOUS EVERY 8 HOURS
Status: COMPLETED | OUTPATIENT
Start: 2020-08-25 | End: 2020-08-26

## 2020-08-25 RX ORDER — ALBUMIN (HUMAN) 12.5 G/50ML
100 SOLUTION INTRAVENOUS ONCE
Status: COMPLETED | OUTPATIENT
Start: 2020-08-26 | End: 2020-08-26

## 2020-08-25 RX ADMIN — HYDROMORPHONE HYDROCHLORIDE 1 MG: 1 INJECTION, SOLUTION INTRAMUSCULAR; INTRAVENOUS; SUBCUTANEOUS at 13:51

## 2020-08-25 RX ADMIN — ANTACID TABLETS 500 MG: 500 TABLET, CHEWABLE ORAL at 09:09

## 2020-08-25 RX ADMIN — SENNOSIDES 8.6 MG: 8.6 TABLET, FILM COATED ORAL at 22:33

## 2020-08-25 RX ADMIN — HYDROMORPHONE HYDROCHLORIDE 1 MG: 1 INJECTION, SOLUTION INTRAMUSCULAR; INTRAVENOUS; SUBCUTANEOUS at 01:45

## 2020-08-25 RX ADMIN — IPRATROPIUM BROMIDE AND ALBUTEROL SULFATE 1 AMPULE: .5; 3 SOLUTION RESPIRATORY (INHALATION) at 12:25

## 2020-08-25 RX ADMIN — HYDROMORPHONE HYDROCHLORIDE 1 MG: 1 INJECTION, SOLUTION INTRAMUSCULAR; INTRAVENOUS; SUBCUTANEOUS at 17:55

## 2020-08-25 RX ADMIN — MEROPENEM 1 G: 1 INJECTION, POWDER, FOR SOLUTION INTRAVENOUS at 09:09

## 2020-08-25 RX ADMIN — ALBUMIN (HUMAN) 12.5 G: 0.25 INJECTION, SOLUTION INTRAVENOUS at 16:14

## 2020-08-25 RX ADMIN — CEFEPIME HYDROCHLORIDE 2 G: 2 INJECTION, POWDER, FOR SOLUTION INTRAVENOUS at 05:41

## 2020-08-25 RX ADMIN — HYDROMORPHONE HYDROCHLORIDE 1 MG: 1 INJECTION, SOLUTION INTRAMUSCULAR; INTRAVENOUS; SUBCUTANEOUS at 09:49

## 2020-08-25 RX ADMIN — IPRATROPIUM BROMIDE AND ALBUTEROL SULFATE 1 AMPULE: .5; 3 SOLUTION RESPIRATORY (INHALATION) at 21:04

## 2020-08-25 RX ADMIN — SPIRONOLACTONE 25 MG: 25 TABLET ORAL at 09:50

## 2020-08-25 RX ADMIN — ALBUMIN (HUMAN) 12.5 G: 0.25 INJECTION, SOLUTION INTRAVENOUS at 22:21

## 2020-08-25 RX ADMIN — METRONIDAZOLE 500 MG: 500 INJECTION, SOLUTION INTRAVENOUS at 06:26

## 2020-08-25 RX ADMIN — ANTACID TABLETS 500 MG: 500 TABLET, CHEWABLE ORAL at 16:40

## 2020-08-25 RX ADMIN — HYDROMORPHONE HYDROCHLORIDE 1 MG: 1 INJECTION, SOLUTION INTRAMUSCULAR; INTRAVENOUS; SUBCUTANEOUS at 22:34

## 2020-08-25 RX ADMIN — HYDROMORPHONE HYDROCHLORIDE 1 MG: 1 INJECTION, SOLUTION INTRAMUSCULAR; INTRAVENOUS; SUBCUTANEOUS at 05:42

## 2020-08-25 RX ADMIN — Medication 10 ML: at 22:22

## 2020-08-25 RX ADMIN — FLUCONAZOLE 200 MG: 2 INJECTION, SOLUTION INTRAVENOUS at 17:51

## 2020-08-25 RX ADMIN — PIPERACILLIN AND TAZOBACTAM 3.38 G: 3; .375 INJECTION, POWDER, LYOPHILIZED, FOR SOLUTION INTRAVENOUS at 17:47

## 2020-08-25 RX ADMIN — FUROSEMIDE 40 MG: 40 TABLET ORAL at 09:49

## 2020-08-25 RX ADMIN — IPRATROPIUM BROMIDE AND ALBUTEROL SULFATE 1 AMPULE: .5; 3 SOLUTION RESPIRATORY (INHALATION) at 08:58

## 2020-08-25 RX ADMIN — IPRATROPIUM BROMIDE AND ALBUTEROL SULFATE 1 AMPULE: .5; 3 SOLUTION RESPIRATORY (INHALATION) at 16:46

## 2020-08-25 ASSESSMENT — PAIN DESCRIPTION - PROGRESSION: CLINICAL_PROGRESSION: NOT CHANGED

## 2020-08-25 ASSESSMENT — PAIN SCALES - GENERAL
PAINLEVEL_OUTOF10: 10
PAINLEVEL_OUTOF10: 5
PAINLEVEL_OUTOF10: 10
PAINLEVEL_OUTOF10: 10
PAINLEVEL_OUTOF10: 8
PAINLEVEL_OUTOF10: 10

## 2020-08-25 ASSESSMENT — PAIN DESCRIPTION - PAIN TYPE
TYPE: CHRONIC PAIN
TYPE: ACUTE PAIN

## 2020-08-25 ASSESSMENT — ENCOUNTER SYMPTOMS
NAUSEA: 0
COUGH: 1
EYE DISCHARGE: 0
EYE REDNESS: 0
ABDOMINAL DISTENTION: 1
ABDOMINAL PAIN: 1
SORE THROAT: 0
DIARRHEA: 0
CONSTIPATION: 0
WHEEZING: 0
SHORTNESS OF BREATH: 1
RHINORRHEA: 0
TROUBLE SWALLOWING: 0
BACK PAIN: 0

## 2020-08-25 ASSESSMENT — PAIN DESCRIPTION - LOCATION
LOCATION: GENERALIZED
LOCATION: GENERALIZED

## 2020-08-25 ASSESSMENT — PAIN DESCRIPTION - DESCRIPTORS: DESCRIPTORS: CONSTANT

## 2020-08-25 ASSESSMENT — PAIN DESCRIPTION - ONSET: ONSET: ON-GOING

## 2020-08-25 ASSESSMENT — PAIN DESCRIPTION - FREQUENCY: FREQUENCY: CONTINUOUS

## 2020-08-25 NOTE — PROGRESS NOTES
PM assessment complete, VSS. No acute S/S of distress noted. Medicated pt with PRN pain meds. Will continue to monitor.

## 2020-08-25 NOTE — PROGRESS NOTES
[facility]  Gastroenterology Progress Note    Jorge Ye is a 59 y.o. male patient. 1. Severe sepsis ([facility]    2. Pneumonia due to organism    3. Acute kidney injury ([facility] [address]        SUBJECTIVE:  Has abdominal bloating and pain - stable. No N/V. tolerating diet. No BM since Saturday. ROS:  Cardiovascular ROS: no chest pain or dyspnea on exertion  Gastrointestinal ROS: see hpi  Respiratory ROS: no cough, shortness of breath, or wheezing    Physical    VITALS:  /65   Pulse 80   Temp 98.4 °F (36.9 °C) (Oral)   Resp 16   Ht 6' 2\" (1.88 m)   Wt 199 lb 11.8 oz (90.6 kg)   SpO2 94%   BMI 25.64 kg/m²   TEMPERATURE:  Current - Temp: 98.4 °F (36.9 °C); Max - Temp  Av.7 °F (37.1 °C)  Min: 98 °F (36.7 °C)  Max: 99.5 °F (37.5 °C)    NAD  RRR, Nl s1s2  Lungs CTA Bilaterally, normal effort  Abdomen soft, mild distention, mild diffuse tenderness, no HSM, Bowel sounds present  AAOx3, No asterixis   edema BLE (decreased)    Data    Data Review:    Recent Labs     20  0520  0504 20  0516   WBC 14.6* 17.6* 15.2*   HGB 9.4* 9.5* 8.8*   HCT 27.0* 27.4* 25.3*   .8* 111.5* 110.3*   PLT 78* 90* 81*     Recent Labs     20  0520  0504 20  0516   * 135* 133*   K 3.6 3.7 3.9    101 99   CO2 25 25 24   PHOS 1.6*  --   --    BUN 27* 27* 28*   CREATININE 1.3 1.1 1.5*     Recent Labs     20  0504 20  0516   AST 43* 48* 41*   ALT 24 26 21   BILIDIR 4.7* 7.6* 8.8*   BILITOT 9.1* 13.6* 14.2*   ALKPHOS 70 73 59     No results for input(s): LIPASE, AMYLASE in the last 72 hours. Recent Labs     20  0521 20  0504 20  0516   PROTIME 19.2* 23.1* 36.4*   INR 1.65* 1.98* 3.10*     No results for input(s): PTT in the last 72 hours. ASSESSMENT / PLAN      Cirrhosis - due to alcohol abuse. No GI bleeding. No prior EGD. Worsening hepatic function today now with leukocytosis. Afeb. S/p para with 3.5 L fluid removed . Neutrophil count was 228 which is on the higher side but not over 250 which is needed to diagnose neutrocytic ascites. Cx neg. He was on Cefepime prior to para so it is possible he could have had SBP which was partially treated. SAAG elevated at 2.7 c/w portal HTN. Repeat para 8/24 with 4.5 L fluid removed. Neutrophil count has increased to 417 c/w neutrocytic ascites. Antibiotics changed to meropenum 8/25 per ID.   - f/u repeat blood cx  - f/u ascitic fluid cx  - monitor for improvement in abdominal pain since antibiotics changed   - give albumin 1 gram/kg Wednesday    H/o HCV - RNA level detectable at low level in 2018. KACEY - cr initially improved but increased to 1.5. Sepsis  Pneumonia - covid negative. Discussed with Dr. Cheryl Jones, [facility]    I have personally performed a face to face diagnostic evaluation on this patient. I have interviewed and examined the patient and I agree with the findings and recommended plan of care. In summary, my findings and plan are the following: Pt wo new complaints. Still with a feeling of bloating or gas. Fluid analysis c/w SBP. Abx changed by ID. Worsening hepatic function likely due to SBP. Alb infusions per protocol.        Eduardo Nelson MD  [address] and [address]

## 2020-08-25 NOTE — PROGRESS NOTES
BP remains high. Please have him increase the metoprolol XL to 100 mg daily. Script pended. He can take 2 tabs daily of his current bottle until he needs a refill, then  the 100 mg tabs.  Blood glucose has also been running high. A1C ordered, I would like him to come in to any [place] lab for this.   He should contact the clinic in about 2 weeks with blood pressure readings.    No acute changes from previous assessment, VSS. Will continue to monitor.

## 2020-08-25 NOTE — PROGRESS NOTES
Immunization History: All immunization history was reviewed by me today. There is no immunization history on file for this patient. Known drug allergies: All allergies were reviewed and updated    No Known Allergies    Social history:     Social History:  All social andepidemiologic history was reviewed and updated by me today as needed. · Tobacco use:   reports that he has been smoking. He has been smoking about 0.25 packs per day. He has never used smokeless tobacco.  · Alcohol use:   reports previous alcohol use. · Currently lives in: [facility]  ·  reports no history of drug use. Assessment:     The patient is a 59 y.o. old male who  has a past medical history of Cirrhosis of liver ([address] with following problems:    · Methicillin susceptible Staphylococcus aureus pneumonia  · Sepsis on admission  · Spontaneous bacterial peritonitis  · Decompensated hepatic cirrhosis  · Acute on chronic kidney disease  · Negative HIV screen in August 2018  · Chronic hepatitis C  · History of alcoholism  · Ex-smoker      Discussion:      The patient has been admitted since August 17 and has been on IV vancomycin and IV cefepime. White cell count started going up 3 days ago and was 17,600 yesterday and 15,200 today. His sputum culture from 8/22/2020 was positive for MSSA. He came originally for generalized body aches, generalized fatigue and loss of taste. He was tested for COVID on 8/17/2020 with a PCR based test and was tested negative.   The patient has remained on room air    Plan:     Diagnostic Workup:    · Follow-up on repeat blood culture from 8/23/2020 and ascites fluid culture from 8/24/2020  · Continue to follow  fever curve, WBC count and blood cultures  · Follow up on liver and renal function    Antimicrobials:    · The patient has been on IV vancomycin and IV cefepime and now has been started on IV meropenem by primary team.  I do not think broadening the coverage to IV meropenem is needed at this time  · Will stop IV meropenem and start IV Zosyn 3.375 g every 8 hour  · We will also start IV fluconazole 200 mg every 24 hour  · Continue to monitor his vitals closely  · Continue to watch for new fever or diarrhea  · Start oral probiotics twice daily  · We will follow-up on the culture results and clinical progress and will make further recommendations accordingly  · Aspiration precautions  · Cough and deep breathing exercises  · Discussed the above plan with patient and RN       Drug Monitoring:    · Continue monitoring for antibiotic toxicity as follows: CBC, CMP  · Continue to watch for following: new or worsening fever, new hypotension, hives, lip swelling and redness or purulence at vascular access sites. Current isolation precautions: There are no current isolations documented for this patient. I/v access Management:    · Continue to monitor i.v access sites for erythema, induration,discharge or tenderness. · As always, continue efforts to minimize tubes/ lines/ drains as clinically appropriate to reduce chances of line associated infections. Patient education and counseling:     · The patient was educated in detail about the side-effects of various antibiotics and things to watch for like new rashes, lip swelling, severe reaction, worsening diarrhea, break through fever etc.  · Discussed patient's condition and what to expect. All of the patient's questions were addressed in a satisfactory manner and patient verbalized understanding all instructions. Level of complexity of consult: High     Risk of Complications/Morbidity: High     · Illness(es)/ Infection present that pose threat to life/bodily function. · There is potential for severe exacerbation of infection/side effects of treatment. · Therapy requires intensive monitoring for antimicrobial agent toxicity. Thank you for involving me in the care of your patient. I will continue to follow.  If you have any additional questions, please do not hesitate to contact me. Subjective: Interval history: Interval history was obtained from chart review and RN. The patient appears tired. He is tolerating antibiotics okay. No diarrhea. Had a low-grade fever today     REVIEW OF SYSTEMS:     Review of Systems   Constitutional: Negative for chills, diaphoresis and fever. HENT: Negative for ear discharge, ear pain, rhinorrhea, sore throat and trouble swallowing. Eyes: Negative for discharge and redness. Respiratory: Positive for cough and shortness of breath. Negative for wheezing. Cardiovascular: Negative for chest pain and leg swelling. Gastrointestinal: Positive for abdominal distention and abdominal pain (, More while breathing). Negative for constipation, diarrhea and nausea. Endocrine: Negative for polyuria. Genitourinary: Negative for dysuria, flank pain, frequency, hematuria and urgency. Musculoskeletal: Negative for back pain and myalgias. Skin: Negative for rash. Neurological: Negative for dizziness, seizures and headaches. Hematological: Does not bruise/bleed easily. Psychiatric/Behavioral: Negative for hallucinations and suicidal ideas. All other systems reviewed and are negative. Past Medical History: All past medical history reviewed today. Past Medical History:   Diagnosis Date    Cirrhosis of liver ([facility] [address]     aug 2018       Past Surgical History: All past surgical history was reviewed today. History reviewed. No pertinent surgical history. Family History: All family history was reviewed today.         Problem Relation Age of Onset    High Blood Pressure Mother        Objective:       PHYSICAL EXAM:      Vitals:   Vitals:    08/25/20 0820 08/25/20 0858 08/25/20 1034 08/25/20 1225   BP: 113/65      Pulse: 80      Resp: 18 16  16   Temp: 98.4 °F (36.9 °C)      TempSrc: Oral      SpO2: 95% 94%  94%   Weight:       Height:   6' 2\" (1.88 m)        Physical Exam  Vitals signs and nursing note reviewed. Constitutional:       Appearance: Normal appearance. He is well-developed. HENT:      Head: Normocephalic and atraumatic. Right Ear: External ear normal.      Left Ear: External ear normal.      Nose: Nose normal. No congestion or rhinorrhea. Mouth/Throat:      Mouth: Mucous membranes are moist.      Pharynx: No oropharyngeal exudate or posterior oropharyngeal erythema. Eyes:      General: No scleral icterus. Right eye: No discharge. Left eye: No discharge. Conjunctiva/sclera: Conjunctivae normal.      Pupils: Pupils are equal, round, and reactive to light. Neck:      Musculoskeletal: Normal range of motion and neck supple. No neck rigidity or muscular tenderness. Cardiovascular:      Rate and Rhythm: Normal rate and regular rhythm. Pulses: Normal pulses. Heart sounds: No murmur. No friction rub. Pulmonary:      Effort: Pulmonary effort is normal. No respiratory distress. Breath sounds: No stridor. Rales (Bibasilar patchy crackles) present. No wheezing or rhonchi. Abdominal:      General: Bowel sounds are normal. There is distension. Palpations: Abdomen is soft. Tenderness: There is abdominal tenderness. There is no right CVA tenderness, left CVA tenderness, guarding or rebound. Musculoskeletal: Normal range of motion. General: No swelling or tenderness. Lymphadenopathy:      Cervical: No cervical adenopathy. Skin:     General: Skin is warm and dry. Coloration: Skin is not jaundiced. Findings: No erythema or rash. Neurological:      General: No focal deficit present. Mental Status: He is alert and oriented to person, place, and time. Mental status is at baseline. Motor: No abnormal muscle tone. Psychiatric:         Mood and Affect: Mood normal.         Behavior: Behavior normal.         Thought Content:  Thought content normal.           Lines: All vascular access sites are healthy with no local erythema, discharge or tenderness. Intake and output:    I/O last 3 completed shifts: In: 100 [IV Piggyback:100]  Out: [address]:   All available labs and old records have been reviewed by me. CBC:  Recent Labs     08/23/20  0521 08/24/20  0504 08/25/20  0516   WBC 14.6* 17.6* 15.2*   RBC 2.44* 2.46* 2.30*   HGB 9.4* 9.5* 8.8*   HCT 27.0* 27.4* 25.3*   PLT 78* 90* 81*   .8* 111.5* 110.3*   MCH 38.4* 38.5* 38.3*   MCHC 34.7 34.6 34.7   RDW 16.2* 16.2* 16.1*   BANDSPCT  --  2  --         BMP:  Recent Labs     08/23/20  0521 08/24/20  0504 08/25/20  0516   * 135* 133*   K 3.6 3.7 3.9    101 99   CO2 25 25 24   BUN 27* 27* 28*   CREATININE 1.3 1.1 1.5*   CALCIUM 7.8* 8.1* 7.7*   GLUCOSE 145* 104* 90        Hepatic Function Panel:   Lab Results   Component Value Date    ALKPHOS 59 08/25/2020    ALT 21 08/25/2020    AST 41 08/25/2020    PROT 5.6 08/25/2020    PROT 6.5 08/10/2010    BILITOT 14.2 08/25/2020    BILIDIR 8.8 08/25/2020    IBILI 5.4 08/25/2020    LABALBU 2.7 08/25/2020       CPK:   Lab Results   Component Value Date    CKTOTAL 144 08/10/2010     ESR: No results found for: SEDRATE  CRP: No results found for: CRP        Imaging: All pertinent images and reports for the current visit were reviewed by me during this visit. IR US GUIDED PARACENTESIS   Final Result   Successful ultrasound guided paracentesis. XR CHEST PORTABLE   Final Result   Stable appearance of left-sided pleuroparenchymal disease         IR US GUIDED PARACENTESIS   Final Result   Successful ultrasound guided paracentesis. US GALLBLADDER RUQ   Final Result   Heterogeneous nodular appearance of the liver compatible with known cirrhosis. Gallbladder and common duct appear grossly unremarkable in appearance. Moderate amount of ascites. XR CHEST PORTABLE   Final Result   Increasing pleuroparenchymal disease on the left. Medications: All current and past medications were reviewed.  albumin human  12.5 g Intravenous Q8H    [START ON 8/26/2020] albumin human  100 g Intravenous Once    polyethylene glycol  17 g Oral BID    piperacillin-tazobactam  3.375 g Intravenous Q8H    fluconazole  200 mg Intravenous Q24H    spironolactone  25 mg Oral Daily    ipratropium-albuterol  1 ampule Inhalation Q4H WA    sodium chloride flush  10 mL Intravenous 2 times per day           simethicone, potassium chloride **OR** potassium alternative oral replacement **OR** potassium chloride, calcium carbonate, HYDROmorphone, sodium chloride flush, acetaminophen **OR** acetaminophen, senna, ondansetron **OR** ondansetron, perflutren lipid microspheres      Problem list:       Patient Active Problem List   Diagnosis Code    Cirrhosis ([facility] K74.60    Anasarca R60.1    HCAP (healthcare-associated pneumonia) J18.9    Severe sepsis (HCC) A41.9, R65.20    Acute kidney injury ([facility] [address] N17.9    Anemia D64.9    Thrombocytopenia (HCC) D69.6    Coagulopathy ([facility] [address] F99.9    Alcoholic cirrhosis ([facility] [address] M10.64    Hyponatremia E87.1    Moderate malnutrition ([facility] [address] E44.0    Pneumonia due to methicillin susceptible Staphylococcus aureus (MSSA) ([facility] J15.211    SBP (spontaneous bacterial peritonitis) (HCC) K65.2    Chronic hepatitis C without hepatic coma (HCC) B18.2    History of alcoholism ([facility] [address] F10.21    Ex-smoker O86.188       Please note that this chart was generated using Dragon dictation software. Although every effort was made to ensure the accuracy of this automated transcription, some errors in transcription may have occurred inadvertently. If you may need any clarification, please do not hesitate to contact me through EPIC or at the phone number provided below with my electronic signature.   Any pictures or media included in this note were obtained after taking informed verbal consent from the patient and with their approval to include those in the patient's medical record.     Rudi Treadwell MD, MPH  8/25/2020 , 2:39 PM   [facility] Infectious Disease   Office: [phone]  Fax: [phone]  Tuesday AM clinic:   [address] [place] [address]  Thursday AM [facility]:[id number] [address]

## 2020-08-25 NOTE — PROGRESS NOTES
MD Iveth Conroy MD Creston Batty, MD               Office: [phone]                      Fax: [phone]             8 [facility]                   NEPHROLOGY INPATIENT PROGRESS NOTE:     PATIENT NAME: Ricki Beck  : 1955  MRN: [id number]    IMPRESSION:       KACEY (on no CKD): crea worse today. 4.5L removed by paracentesis yesterday.    - BL Scr- 0.9  ---> 2.1 on admission. Improved to 1.1 but higher at 1.5 today.    : Etiology of current KACEY - prerenal vs. ATN vs. Hepatorenal physiology type 2. Abdominal compartment syndrome less likely with paracentesis done yesterday. Recheck Stu. Hold Lasix. Can continue SPNL. Give albumin. Will add Midodrine. + severe leg edema + anasarca   : d/to lower albumin, liver disease  : unlikely NS  : US ABD:   Heterogeneous nodular appearance of the liver compatible with known cirrhosis. Gallbladder and common duct appear grossly unremarkable in appearance. Moderate amount of ascites.       : Na: Hyponatremia - d/to KACEY + cirrhosis - moderate - follow as stable. - Acid-Base: stable ,     - Anemia: mild        RECOMMENDATIONS:    - Albumin x3. Add Midodrine. Hold Lasix. - not a good candidate for HD.   - at higher risk for worsening needing close monitoring   - overall poor prognosis d/to liver disease       Other problems: Management per primary and other consulting teams.    Hospital Problems           Last Modified POA    * (Principal) Severe sepsis ([address] 2020 Yes    Cirrhosis ([address] 2020 Yes    Anasarca 2020 Yes    HCAP (healthcare-associated pneumonia) 2020 Yes    Acute kidney injury ([address] 2020 Yes    Anemia 2020 Yes    Thrombocytopenia ([address] 2020 Yes    Coagulopathy ([address] 8575 Yes    Alcoholic cirrhosis ([address] 3/30/5945 Yes    Hyponatremia 2020 Yes    Moderate malnutrition ([address] (Chronic) 2020 Yes    Pneumonia due to methicillin susceptible Staphylococcus aureus (MSSA) ([facility] 8/25/2020 Yes    SBP (spontaneous bacterial peritonitis) ([facility] 8/25/2020 Yes    Chronic hepatitis C without hepatic coma ([facility] 8/25/2020 Yes    History of alcoholism ([facility] 8/25/2020 Yes    Ex-smoker 8/25/2020 Yes        High Complexity. Multiple complex problems. Discussed with Medicine    Kaushik Rondon MD       [facility] [id number] [place]: [phone] or [address] [facility] [place]  Fax: [phone]        ========================================================  Subjective:   Seen resting in bed today  Denies SOB  +Edema and abdominal distention    Past medical, Surgical, Social, Family medical history reviewed by me. MEDICATIONS: reviewed by me. Medications Prior to Admission:  No current facility-administered medications on file prior to encounter. Current Outpatient Medications on File Prior to Encounter   Medication Sig Dispense Refill    furosemide (LASIX) 80 MG tablet Take 1 tablet by mouth daily 30 tablet 3    spironolactone (ALDACTONE) 100 MG tablet Take 1 tablet by mouth daily 30 tablet 3    potassium chloride (KLOR-CON M) 20 MEQ extended release tablet Take 1 tablet by mouth daily 30 tablet 5       Medications Prior to Admission: furosemide (LASIX) 80 MG tablet, Take 1 tablet by mouth daily  spironolactone (ALDACTONE) 100 MG tablet, Take 1 tablet by mouth daily  potassium chloride (KLOR-CON M) 20 MEQ extended release tablet, Take 1 tablet by mouth daily     Scheduled Meds:   albumin human  12.5 g Intravenous Q8H    [START ON 8/26/2020] albumin human  100 g Intravenous Once    polyethylene glycol  17 g Oral BID    piperacillin-tazobactam  3.375 g Intravenous Q8H    fluconazole  200 mg Intravenous Q24H    spironolactone  25 mg Oral Daily    ipratropium-albuterol  1 ampule Inhalation Q4H WA    sodium chloride flush  10 mL Intravenous 2 times per day     Continuous Infusions:  PRN Meds:.     REVIEW OF SYSTEMS:  As mentioned in chief complaint and HPI , Subjective       PHYSICAL EXAM:  Recent vital signs and recent I/Os reviewed by me. Wt Readings from Last 3 Encounters:   08/25/20 199 lb 11.8 oz (90.6 kg)   08/13/20 229 lb 6.4 oz (104.1 kg)   08/06/20 228 lb (103.4 kg)     BP Readings from Last 3 Encounters:   08/25/20 101/65   08/13/20 112/78   08/06/20 97/73     Patient Vitals for the past 24 hrs:   BP Temp Temp src Pulse Resp SpO2 Height Weight   08/25/20 1646 -- -- -- -- 16 94 % -- --   08/25/20 1615 101/65 98.6 °F (37 °C) Oral 83 16 95 % -- --   08/25/20 1225 -- -- -- -- 16 94 % -- --   08/25/20 1034 -- -- -- -- -- -- 6' 2\" (1.88 m) --   08/25/20 0858 -- -- -- -- 16 94 % -- --   08/25/20 0820 113/65 98.4 °F (36.9 °C) Oral 80 18 95 % -- --   08/25/20 0541 101/61 99.5 °F (37.5 °C) Oral 83 -- -- -- --   08/25/20 0325 103/60 98.9 °F (37.2 °C) Oral 82 16 94 % -- 199 lb 11.8 oz (90.6 kg)   08/25/20 0145 106/62 99 °F (37.2 °C) Oral 83 16 94 % -- --   08/24/20 2329 103/60 98 °F (36.7 °C) Oral 82 16 95 % -- --   08/24/20 2103 -- -- -- -- 16 95 % -- --   08/24/20 2045 105/63 98.5 °F (36.9 °C) Oral 88 16 93 % -- --       Intake/Output Summary (Last 24 hours) at 8/25/2020 1721  Last data filed at 8/25/2020 1334  Gross per 24 hour   Intake 340 ml   Output 525 ml   Net -185 ml          Physical exam:  General: Awake, Alert,   HENT: Atraumatic, normocephalic   Eyes: Normal conjunctiva, Non-incteral sclera. Neck: Supple, JVD not visible. CVS:  Heart sounds are normal. No murmurs. No pericardial rub. RS: Normal respiratory efforts,  Lung fields are clear.    Abd: Soft , bowel sounds are normal, distended  Skin: No rash ,  bruises,   CNS: Awake Oriented , No focal deficits  Extremities/MSK: 3+ Edema      DATA:  Diagnostic tests reviewed by me for today's visit:    Recent Labs     08/23/20  0521 08/24/20  0504 08/25/20  0516   WBC 14.6* 17.6* 15.2*   HCT 27.0* 27.4* 25.3*   PLT 78* 90* 81*     Iron Saturation:  No components found for: PERCENTFE  FERRITIN:    Lab Results   Component Value Date    FERRITIN 864.6 08/18/2020     IRON:    Lab Results   Component Value Date    IRON 137 08/07/2018     TIBC:    Lab Results   Component Value Date    TIBC see below 08/07/2018       Recent Labs     08/23/20  0521 08/24/20  0504 08/25/20  0516   * 135* 133*   K 3.6 3.7 3.9    101 99   CO2 25 25 24   BUN 27* 27* 28*   CREATININE 1.3 1.1 1.5*     Recent Labs     08/23/20  0521 08/24/20  0504 08/25/20  0516   CALCIUM 7.8* 8.1* 7.7*   MG 1.70*  --   --    PHOS 1.6*  --   --      No results for input(s): PH, PCO2, PO2 in the last 72 hours.     Invalid input(s): Edin Tracy    ABG:  No results found for: PH, PCO2, PO2, HCO3, BE, THGB, TCO2, O2SAT  VBG:  No results found for: PHVEN, GNS2SGP, BEVEN, J3BZAVNF    LDH:    Lab Results   Component Value Date     08/18/2020     Uric Acid:    Lab Results   Component Value Date    LABURIC 8.1 08/17/2020       PT/INR:    Lab Results   Component Value Date    PROTIME 36.4 08/25/2020    INR 3.10 08/25/2020     Warfarin PT/INR:  No components found for: PTPATWAR, PTINRWAR  PTT:    Lab Results   Component Value Date    APTT 59.8 08/25/2020   [APTT}  Last 3 Troponin:    Lab Results   Component Value Date    TROPONINI <0.01 08/17/2020    TROPONINI <0.01 04/18/2019    TROPONINI <0.01 08/06/2018       U/A:    Lab Results   Component Value Date    COLORU Margarita 08/17/2020    PROTEINU TRACE 08/17/2020    PHUR 5.5 08/17/2020    WBCUA 16 08/17/2020    RBCUA 6 08/17/2020    MUCUS 3+ 08/06/2018    TRICHOMONAS Present 08/17/2020    BACTERIA 3+ 08/06/2018    CLARITYU Clear 08/17/2020    SPECGRAV 1.015 08/17/2020    LEUKOCYTESUR SMALL 08/17/2020    UROBILINOGEN >=8.0 08/17/2020    BILIRUBINUR LARGE 08/17/2020    BLOODU MODERATE 08/17/2020    GLUCOSEU 100 08/17/2020    AMORPHOUS 2+ 08/06/2018     Microalbumen/Creatinine ratio:  No components found for: RUCREAT  24 Hour Urine for Protein:  No components found for: benefits, and alternatives. Universal protocol was followed. The right abdomen was prepped and draped in sterile fashion and local anesthesia was achieved with lidocaine. A 5 [employer] Kimberly Yueh needle sheath was advanced under ultrasound guidance into ascites and paracentesis was performed. The patient tolerated the procedure well. FINDINGS: A total of 5.5 L of yellow ascites was removed. Successful ultrasound guided paracentesis. Ir Us Guided Paracentesis    Result Date: 8/6/2020  PROCEDURE: ULTRASOUND GUIDED PARACENTESIS 8/6/2020 HISTORY: ORDERING SYSTEM PROVIDED HISTORY: Ascites due to alcoholic cirrhosis ([facility] TECHNIQUE: Informed consent was obtained after a detailed explanation of the procedure including risks, benefits, and alternatives. Universal protocol was followed. The right abdomen was prepped and draped in sterile fashion and local anesthesia was achieved with lidocaine. An 5 Romanian needle sheath was advanced under ultrasound guidance into ascites and paracentesis was performed. The patient tolerated the procedure well. 5 L removed. FINDINGS: A total of 5 L clear yellow fluid removed. Was removed. Successful ultrasound guided paracentesis. Ir Us Guided Paracentesis    Result Date: 7/30/2020  PROCEDURE: ULTRASOUND GUIDED PARACENTESIS 7/30/2020 HISTORY: ORDERING SYSTEM PROVIDED HISTORY: Ascites due to alcoholic cirrhosis ([facility] TECHNIQUE: Informed consent was obtained after a detailed explanation of the procedure including risks, benefits, and alternatives. Universal protocol was followed. The right abdomen was prepped and draped in sterile fashion and local anesthesia was achieved with lidocaine. A 5 Romanian needle sheath was advanced under ultrasound guidance into ascites and paracentesis was performed. The patient tolerated the procedure well. Estimated blood loss: Less than 5 cc FINDINGS: A total of 5.6 L was removed. Successful ultrasound guided paracentesis.      Dunia Alexander

## 2020-08-25 NOTE — PROGRESS NOTES
Speech Language Pathology  Dysphagia Treatment Note    Name:  Yaron Estrada  :   1955  Medical Diagnosis:  Sepsis ([facility] [A41.9]  Sepsis ([facility] [A41.9]  Treatment Diagnosis: Oropharyngeal Dysphagia  Pain level:  \"all over pain\" denied intervention     Current Diet Level:   Dysphagia III Soft and Bite Sized with thin liquids, meds with puree     Tolerance of Current Diet Level:   Per pt, tolerating current diet without difficulty. Reported trouble with dry foods was at baseline, prior to admission, does not like dry foods. Assessment of Texture Tolerance:  Pt accepting of dry solid (betsy cracker) and thin liquid via cup and straw. Pt with slow but functional mastication, achieved oral clearance. Sequential drinks of thin liquid were tolerated without any overt s/s of aspiration. Pt with weak belch post swallow. At this time recommend to continue with current diet recommendations, suspect to be baseline diet, however will follow up 1-2 times to ensure tolerance. Diet and Treatment Recommendations:  Continue Dysphagia III Soft and Bite Sized with thin liquids, meds with puree     Dysphagia Goals:  1.) Pt will tolerate recommended diet without s/s of aspiration (ongoing, 2020)  2.) Pt will tolerate diet advance to least restricted diet, as clinically indicated, with no signs of aspiration (ongoing, 2020)    Plan:    Continued daily Dysphagia treatment, 1-2 sessions to ensure tolerance. Patient/Family Education:   Education given to the Pt, who verbalized understanding    Discharge Recommendations:    Do not suspect need for SLP dysphagia treatment upon d/c. Timed Code Treatment: 0 minutes    Total Treatment Time: 10 minutes    If patient discharges prior to next session this note will serve as a discharge summary. Deonna Rodriguez M.A.  CCC-SLP S.P. [id number]  Speech-Language Pathologist [phone]  2020 2:31 PM

## 2020-08-25 NOTE — PROGRESS NOTES
Physical Therapy  Severiano Diaz  PT treatment session attempted but pt refused due to pain level. He reported 10/10 generalized pain and had just \"gotten comfortable\". SLP was in room just prior to PT arrival and stated the pt was not due for pain medication. PT will follow-up with this pt as the schedule allows. Thanks.   Ines Smith [place] DPT [id number]

## 2020-08-25 NOTE — PROGRESS NOTES
[facility] PROGRESS NOTE    8/25/2020 3:36 PM        Name: Yaron Estrada . Admitted: 8/17/2020  Primary Care Provider: No primary care provider on file. (Tel: None)                        Hospital course:   60 yo M with alcoholic cirrhosis, non compliance came to ER with complaints of anasarca.  Admitted as inpatient for HCAP, sepsis and KACEY.  COVID 19 negative on 8/18 from ED.   GI and Renal consulted.  Underwent 3.7 L removal of ascites by IR on 8/19    Subjective: Patient seen and examined. He still complains of abdominal pain and bloating. We have discussed hospice care again today, patient does not want hospice. No acute events overnight. Resting well. Pain control. Diet ok. Labs reviewed  Denies any chest pain sob.      Reviewed interval ancillary notes    Current Medications  albumin human 25 % IV solution 12.5 g, Q8H  [START ON 8/26/2020] albumin human 25 % IV solution 100 g, Once  polyethylene glycol (GLYCOLAX) packet 17 g, BID  piperacillin-tazobactam (ZOSYN) 3.375 g in dextrose 5 % 50 mL IVPB extended infusion (mini-bag), Q8H  fluconazole (DIFLUCAN) in 0.9 % sodium chloride IVPB 200 mg, Q24H  simethicone (MYLICON) chewable tablet 80 mg, Q6H PRN  potassium chloride (KLOR-CON M) extended release tablet 40 mEq, PRN    Or  potassium bicarb-citric acid (EFFER-K) effervescent tablet 40 mEq, PRN    Or  potassium chloride 10 mEq/100 mL IVPB (Peripheral Line), PRN  spironolactone (ALDACTONE) tablet 25 mg, Daily  calcium carbonate (TUMS) chewable tablet 500 mg, TID PRN  HYDROmorphone HCl PF (DILAUDID) injection 1 mg, Q4H PRN  ipratropium-albuterol (DUONEB) nebulizer solution 1 ampule, Q4H WA  sodium chloride flush 0.9 % injection 10 mL, 2 times per day  sodium chloride flush 0.9 % injection 10 mL, PRN  acetaminophen (TYLENOL) tablet 650 mg, Q6H PRN Or  acetaminophen (TYLENOL) suppository 650 mg, Q6H PRN  senna (SENOKOT) tablet 8.6 mg, Daily PRN  ondansetron (ZOFRAN-ODT) disintegrating tablet 4 mg, Q8H PRN    Or  ondansetron (ZOFRAN) injection 4 mg, Q6H PRN  perflutren lipid microspheres (DEFINITY) injection 1.65 mg, ONCE PRN        Objective:  /65   Pulse 80   Temp 98.4 °F (36.9 °C) (Oral)   Resp 16   Ht 6' 2\" (1.88 m)   Wt 199 lb 11.8 oz (90.6 kg)   SpO2 94%   BMI 25.64 kg/m²     Intake/Output Summary (Last 24 hours) at 8/25/2020 1536  Last data filed at 8/25/2020 1334  Gross per 24 hour   Intake 340 ml   Output 525 ml   Net -185 ml      Wt Readings from Last 3 Encounters:   08/25/20 199 lb 11.8 oz (90.6 kg)   08/13/20 229 lb 6.4 oz (104.1 kg)   08/06/20 228 lb (103.4 kg)       General appearance: -American gentleman appears comfortable  Eyes: Sclera clear. Pupils equal.  ENT: Moist oral mucosa. Trachea midline, no adenopathy, neck supple. Cardiovascular: Regular rhythm, normal S1, S2. No murmur. No edema in lower extremities  Respiratory: Not using accessory muscles. Good inspiratory effort. Clear to auscultation bilaterally, no wheeze or crackles. GI: Abdomen soft, some tenderness, distended due to ascites, normal bowel sounds  Musculoskeletal: No deformity, ROM WNL. Neurology: CN 2-12 grossly intact. No speech or motor deficits  Psych: Normal affect.  Alert and oriented in time, place and person  Skin: Warm, dry, normal turgor  Extremities nonpitting edema noted  Labs and Tests:  CBC:   Recent Labs     08/23/20  0521 08/24/20  0504 08/25/20  0516   WBC 14.6* 17.6* 15.2*   HGB 9.4* 9.5* 8.8*   HCT 27.0* 27.4* 25.3*   .8* 111.5* 110.3*   PLT 78* 90* 81*     BMP:    Recent Labs     08/23/20  0521 08/24/20  0504 08/25/20  0516   * 135* 133*   K 3.6 3.7 3.9    101 99   CO2 25 25 24   BUN 27* 27* 28*   CREATININE 1.3 1.1 1.5*   GLUCOSE 145* 104* 90     Hepatic:   Recent Labs     08/23/20  0521 08/24/20  0504 08/25/20  0516   AST 43* 48* 41*   ALT 24 26 21   BILITOT 9.1* 13.6* 14.2*   ALKPHOS 70 73 59         Problem List  Principal Problem:    Severe sepsis (HCC)  Active Problems:    Cirrhosis (HCC)    Anasarca    HCAP (healthcare-associated pneumonia)    Acute kidney injury ([facility]    Anemia    Thrombocytopenia (HCC)    Coagulopathy (HCC)    Alcoholic cirrhosis (HCC)    Hyponatremia    Moderate malnutrition (HCC)    Pneumonia due to methicillin susceptible Staphylococcus aureus (MSSA) (HCC)    SBP (spontaneous bacterial peritonitis) (HCC)    Chronic hepatitis C without hepatic coma (HCC)    History of alcoholism ([facility]    Ex-smoker  Resolved Problems:    * No resolved hospital problems. *     Assessment & Plan:      1.  Alcoholic cirrhosis: Gastroenterology service wants to repeat  another paracentesis yesterday with 4 L removed,  Patient continues to have worsening liver function as evidenced by increasing INR, bilirubin, worsening hyponatremia  Discussed with GI PA, options will be considered after discussion with the attending including TIPS  2.  Ascites status post paracentesis on 8/19 3.7 L fluid removed, and again 4 L on 8/24 Lasix held in view of worsening renal function   3.  Patient is on empiric antibiotic treatment with cefepime vancomycin for possible H CAP, will check follow-up chest x-ray portable today and procalcitonin levels, however, white count continues to go up and patient is still with abdominal pain. We will consult infectious disease for recommendation on continued antibiotic therapy. 4.  Acute kidney injury on chronic kidney disease, creatinine 1.6--> 1.4--> 1.3 went up to 1.5 again today, nephrology consulted: Prerenal KACEY as well as hepatorenal syndrome as well as compartment syndrome from ascitic fluid are considered.   Holding Lasix and albumin administration are the first therapies to be attempted  5.  PT OT evaluation done, home health care versus skilled nursing care facility

## 2020-08-25 NOTE — PROGRESS NOTES
Comprehensive Nutrition Assessment    Type and Reason for Visit:  Initial(LOS)    Nutrition Recommendations/Plan:   Encourage po intake. If less than 50% of meals, offer ONS    Nutrition Assessment:  Pt is nutritionally compromised AEB inadequate oral intake upon admission. Reports usually has a good appetite, but c/o extreme gas pain with po intake. Reports wt is stable. Hx reveals large wt fluctuations over past year, likely related to ascites, but pt appears to have muscle wasting in clavicle region. Suggested ONS to help improve kcal & protein intake, but pt declines @ this time. Requests meds to aid in abdominal discomfort (to be addressed with RN). No further nutrition concerns expressed @ this time. Malnutrition Assessment:  Malnutrition Status: Moderate malnutrition    Context:  Chronic Illness     Findings of the 6 clinical characteristics of malnutrition:  Energy Intake:  Mild decrease in energy intake (Comment)  Weight Loss:  Unable to assess(large fluctuation but unable to determine if true wt loss)     Body Fat Loss:  No significant body fat loss     Muscle Mass Loss:  7 - Severe muscle mass loss Clavicles (pectoralis & deltoids)  Fluid Accumulation:  1 - Mild Extremities   Strength:  Not Performed    Estimated Daily Nutrient Needs:  Energy (kcal):  0898-5200 kcal (22-25 kcal/91 kg); Weight Used for Energy Requirements:  Current     Protein (g):  112- 187 g(1.3-2.0 g/86 kg); Weight Used for Protein Requirements:  Ideal        Fluid (ml/day):  1200 ml/day per MD; Weight Used for Fluid Requirements:  Current      Nutrition Related Findings:  I/O: -4 L since admit; +2 edema BLE; active BS      Wounds:  None       Current Nutrition Therapies:    DIET LOW SODIUM 2 GM;  Dysphagia Soft and Bite-Sized; 1200 ml    Anthropometric Measures:  · Height: 6' 2\" (188 cm)  · Current Body Weight: 199 lb (90.3 kg)   · Admission Body Weight:      · Usual Body Weight: (fluctuates from 216-251 over past year per hx)     · Ideal Body Weight: 190 lbs; % Ideal Body Weight 104.7 %   · BMI: 25.5  · Adjusted Body Weight:  ;     · Adjusted BMI:      · BMI Categories: Overweight (BMI 25.0-29. 9)       Nutrition Diagnosis:   · Moderate malnutrition related to inadequate protein-energy intake as evidenced by severe muscle loss, localized or generalized fluid accumulation, other (comment)(possible wt loss with liver dysfunction)      Nutrition Interventions:   Food and/or Nutrient Delivery:  Continue Current Diet  Nutrition Education/Counseling:  Education not indicated   Coordination of Nutrition Care:  Continued Inpatient Monitoring    Goals:  po intake greater than 50% of meals       Nutrition Monitoring and Evaluation:     Food/Nutrient Intake Outcomes:  Food and Nutrient Intake  Physical Signs/Symptoms Outcomes:  GI Status, Nutrition Focused Physical Findings, Weight     Discharge Planning:    Continue current diet     Electronically signed by Gaby Glaser RD, LD on 8/25/20 at 10:52 AM EDT    Contact: [phone]

## 2020-08-26 ENCOUNTER — APPOINTMENT (OUTPATIENT)
Dept: GENERAL RADIOLOGY | Age: 65
DRG: 871 | End: 2020-08-26
Payer: MEDICARE

## 2020-08-26 LAB
A/G RATIO: 0.8 (ref 1.1–2.2)
ALBUMIN SERPL-MCNC: 2.5 G/DL (ref 3.4–5)
ALP BLD-CCNC: 56 U/L (ref 40–129)
ALT SERPL-CCNC: 19 U/L (ref 10–40)
ANION GAP SERPL CALCULATED.3IONS-SCNC: 10 MMOL/L (ref 3–16)
APTT: 63.4 SEC (ref 24.2–36.2)
AST SERPL-CCNC: 39 U/L (ref 15–37)
BASOPHILS ABSOLUTE: 0.1 K/UL (ref 0–0.2)
BASOPHILS RELATIVE PERCENT: 0.4 %
BILIRUB SERPL-MCNC: 16.8 MG/DL (ref 0–1)
BILIRUBIN DIRECT: 10 MG/DL (ref 0–0.3)
BILIRUBIN URINE: ABNORMAL
BILIRUBIN, INDIRECT: 6.8 MG/DL (ref 0–1)
BLOOD, URINE: ABNORMAL
BUN BLDV-MCNC: 33 MG/DL (ref 7–20)
CALCIUM SERPL-MCNC: 7.9 MG/DL (ref 8.3–10.6)
CHLORIDE BLD-SCNC: 98 MMOL/L (ref 99–110)
CLARITY: CLEAR
CO2: 24 MMOL/L (ref 21–32)
COLOR: ABNORMAL
CREAT SERPL-MCNC: 1.8 MG/DL (ref 0.8–1.3)
D DIMER: 3770 NG/ML DDU (ref 0–229)
EOSINOPHILS ABSOLUTE: 0.2 K/UL (ref 0–0.6)
EOSINOPHILS RELATIVE PERCENT: 1.3 %
EPITHELIAL CELLS, UA: 2 /HPF (ref 0–5)
FIBRINOGEN: 125 MG/DL (ref 200–397)
FINE CASTS, UA: ABNORMAL /LPF (ref 0–2)
GFR AFRICAN AMERICAN: 46
GFR NON-AFRICAN AMERICAN: 38
GLOBULIN: 3 G/DL
GLUCOSE BLD-MCNC: 108 MG/DL (ref 70–99)
GLUCOSE URINE: NEGATIVE MG/DL
HCT VFR BLD CALC: 24.3 % (ref 40.5–52.5)
HEMATOLOGY PATH CONSULT: NO
HEMOGLOBIN: 8.3 G/DL (ref 13.5–17.5)
INR BLD: 2.64 (ref 0.86–1.14)
KETONES, URINE: ABNORMAL MG/DL
LEUKOCYTE ESTERASE, URINE: NEGATIVE
LYMPHOCYTES ABSOLUTE: 1.2 K/UL (ref 1–5.1)
LYMPHOCYTES RELATIVE PERCENT: 7.7 %
MAGNESIUM: 1.5 MG/DL (ref 1.8–2.4)
MCH RBC QN AUTO: 38.2 PG (ref 26–34)
MCHC RBC AUTO-ENTMCNC: 34.3 G/DL (ref 31–36)
MCV RBC AUTO: 111.2 FL (ref 80–100)
MICROSCOPIC EXAMINATION: YES
MONOCYTES ABSOLUTE: 3.2 K/UL (ref 0–1.3)
MONOCYTES RELATIVE PERCENT: 20.5 %
NEUTROPHILS ABSOLUTE: 10.9 K/UL (ref 1.7–7.7)
NEUTROPHILS RELATIVE PERCENT: 70.1 %
NITRITE, URINE: NEGATIVE
PDW BLD-RTO: 15.9 % (ref 12.4–15.4)
PH UA: 5.5 (ref 5–8)
PLATELET # BLD: 87 K/UL (ref 135–450)
PMV BLD AUTO: 9.2 FL (ref 5–10.5)
POTASSIUM REFLEX MAGNESIUM: 3.5 MMOL/L (ref 3.5–5.1)
PROTEIN UA: 30 MG/DL
PROTHROMBIN TIME: 30.9 SEC (ref 10–13.2)
RBC # BLD: 2.18 M/UL (ref 4.2–5.9)
RBC UA: 27 /HPF (ref 0–4)
SODIUM BLD-SCNC: 132 MMOL/L (ref 136–145)
SODIUM URINE: <20 MMOL/L
SPECIFIC GRAVITY UA: 1.02 (ref 1–1.03)
TOTAL PROTEIN: 5.5 G/DL (ref 6.4–8.2)
URINE TYPE: ABNORMAL
UROBILINOGEN, URINE: 0.2 E.U./DL
WBC # BLD: 15.6 K/UL (ref 4–11)
WBC UA: 14 /HPF (ref 0–5)

## 2020-08-26 PROCEDURE — 74022 RADEX COMPL AQT ABD SERIES: CPT

## 2020-08-26 PROCEDURE — 6360000002 HC RX W HCPCS: Performed by: HOSPITALIST

## 2020-08-26 PROCEDURE — 94640 AIRWAY INHALATION TREATMENT: CPT

## 2020-08-26 PROCEDURE — 85730 THROMBOPLASTIN TIME PARTIAL: CPT

## 2020-08-26 PROCEDURE — 94761 N-INVAS EAR/PLS OXIMETRY MLT: CPT

## 2020-08-26 PROCEDURE — 6360000002 HC RX W HCPCS: Performed by: INTERNAL MEDICINE

## 2020-08-26 PROCEDURE — 83735 ASSAY OF MAGNESIUM: CPT

## 2020-08-26 PROCEDURE — 2580000003 HC RX 258: Performed by: INTERNAL MEDICINE

## 2020-08-26 PROCEDURE — 85025 COMPLETE CBC W/AUTO DIFF WBC: CPT

## 2020-08-26 PROCEDURE — 6370000000 HC RX 637 (ALT 250 FOR IP): Performed by: PHYSICIAN ASSISTANT

## 2020-08-26 PROCEDURE — 80053 COMPREHEN METABOLIC PANEL: CPT

## 2020-08-26 PROCEDURE — 92526 ORAL FUNCTION THERAPY: CPT

## 2020-08-26 PROCEDURE — 81001 URINALYSIS AUTO W/SCOPE: CPT

## 2020-08-26 PROCEDURE — 2060000000 HC ICU INTERMEDIATE R&B

## 2020-08-26 PROCEDURE — 85379 FIBRIN DEGRADATION QUANT: CPT

## 2020-08-26 PROCEDURE — 6370000000 HC RX 637 (ALT 250 FOR IP): Performed by: HOSPITALIST

## 2020-08-26 PROCEDURE — 99233 SBSQ HOSP IP/OBS HIGH 50: CPT | Performed by: INTERNAL MEDICINE

## 2020-08-26 PROCEDURE — 6360000002 HC RX W HCPCS: Performed by: PHYSICIAN ASSISTANT

## 2020-08-26 PROCEDURE — 85384 FIBRINOGEN ACTIVITY: CPT

## 2020-08-26 PROCEDURE — 2580000003 HC RX 258: Performed by: HOSPITALIST

## 2020-08-26 PROCEDURE — P9047 ALBUMIN (HUMAN), 25%, 50ML: HCPCS | Performed by: PHYSICIAN ASSISTANT

## 2020-08-26 PROCEDURE — 6370000000 HC RX 637 (ALT 250 FOR IP): Performed by: INTERNAL MEDICINE

## 2020-08-26 PROCEDURE — 84300 ASSAY OF URINE SODIUM: CPT

## 2020-08-26 PROCEDURE — P9047 ALBUMIN (HUMAN), 25%, 50ML: HCPCS | Performed by: INTERNAL MEDICINE

## 2020-08-26 PROCEDURE — 85610 PROTHROMBIN TIME: CPT

## 2020-08-26 RX ORDER — MAGNESIUM SULFATE 1 G/100ML
1 INJECTION INTRAVENOUS PRN
Status: DISCONTINUED | OUTPATIENT
Start: 2020-08-26 | End: 2020-08-29 | Stop reason: HOSPADM

## 2020-08-26 RX ORDER — MAGNESIUM SULFATE IN WATER 40 MG/ML
2 INJECTION, SOLUTION INTRAVENOUS ONCE
Status: COMPLETED | OUTPATIENT
Start: 2020-08-26 | End: 2020-08-26

## 2020-08-26 RX ADMIN — HYDROMORPHONE HYDROCHLORIDE 1 MG: 1 INJECTION, SOLUTION INTRAMUSCULAR; INTRAVENOUS; SUBCUTANEOUS at 06:47

## 2020-08-26 RX ADMIN — PIPERACILLIN AND TAZOBACTAM 3.38 G: 3; .375 INJECTION, POWDER, LYOPHILIZED, FOR SOLUTION INTRAVENOUS at 14:30

## 2020-08-26 RX ADMIN — ANTACID TABLETS 500 MG: 500 TABLET, CHEWABLE ORAL at 16:40

## 2020-08-26 RX ADMIN — MIDODRINE HYDROCHLORIDE 5 MG: 5 TABLET ORAL at 09:27

## 2020-08-26 RX ADMIN — Medication 10 ML: at 21:15

## 2020-08-26 RX ADMIN — FLUCONAZOLE 200 MG: 2 INJECTION, SOLUTION INTRAVENOUS at 19:22

## 2020-08-26 RX ADMIN — ALBUMIN (HUMAN) 12.5 G: 0.25 INJECTION, SOLUTION INTRAVENOUS at 05:26

## 2020-08-26 RX ADMIN — MIDODRINE HYDROCHLORIDE 5 MG: 5 TABLET ORAL at 12:18

## 2020-08-26 RX ADMIN — ALBUMIN (HUMAN) 100 G: 0.25 INJECTION, SOLUTION INTRAVENOUS at 09:31

## 2020-08-26 RX ADMIN — IPRATROPIUM BROMIDE AND ALBUTEROL SULFATE 1 AMPULE: .5; 3 SOLUTION RESPIRATORY (INHALATION) at 08:34

## 2020-08-26 RX ADMIN — MAGNESIUM SULFATE HEPTAHYDRATE 2 G: 40 INJECTION, SOLUTION INTRAVENOUS at 21:38

## 2020-08-26 RX ADMIN — IPRATROPIUM BROMIDE AND ALBUTEROL SULFATE 1 AMPULE: .5; 3 SOLUTION RESPIRATORY (INHALATION) at 20:59

## 2020-08-26 RX ADMIN — Medication 10 ML: at 09:27

## 2020-08-26 RX ADMIN — HYDROMORPHONE HYDROCHLORIDE 1 MG: 1 INJECTION, SOLUTION INTRAMUSCULAR; INTRAVENOUS; SUBCUTANEOUS at 10:54

## 2020-08-26 RX ADMIN — HYDROMORPHONE HYDROCHLORIDE 1 MG: 1 INJECTION, SOLUTION INTRAMUSCULAR; INTRAVENOUS; SUBCUTANEOUS at 15:18

## 2020-08-26 RX ADMIN — HYDROMORPHONE HYDROCHLORIDE 1 MG: 1 INJECTION, SOLUTION INTRAMUSCULAR; INTRAVENOUS; SUBCUTANEOUS at 02:26

## 2020-08-26 RX ADMIN — MIDODRINE HYDROCHLORIDE 5 MG: 5 TABLET ORAL at 16:40

## 2020-08-26 RX ADMIN — IPRATROPIUM BROMIDE AND ALBUTEROL SULFATE 1 AMPULE: .5; 3 SOLUTION RESPIRATORY (INHALATION) at 11:45

## 2020-08-26 RX ADMIN — HYDROMORPHONE HYDROCHLORIDE 1 MG: 1 INJECTION, SOLUTION INTRAMUSCULAR; INTRAVENOUS; SUBCUTANEOUS at 19:19

## 2020-08-26 RX ADMIN — PIPERACILLIN AND TAZOBACTAM 3.38 G: 3; .375 INJECTION, POWDER, LYOPHILIZED, FOR SOLUTION INTRAVENOUS at 02:02

## 2020-08-26 RX ADMIN — POLYETHYLENE GLYCOL 3350 17 G: 17 POWDER, FOR SOLUTION ORAL at 09:26

## 2020-08-26 ASSESSMENT — PAIN DESCRIPTION - FREQUENCY
FREQUENCY: CONTINUOUS
FREQUENCY: CONTINUOUS

## 2020-08-26 ASSESSMENT — PAIN SCALES - GENERAL
PAINLEVEL_OUTOF10: 4
PAINLEVEL_OUTOF10: 10
PAINLEVEL_OUTOF10: 4
PAINLEVEL_OUTOF10: 10

## 2020-08-26 ASSESSMENT — ENCOUNTER SYMPTOMS
SHORTNESS OF BREATH: 1
SORE THROAT: 0
BACK PAIN: 0
TROUBLE SWALLOWING: 0
DIARRHEA: 0
EYE DISCHARGE: 0
WHEEZING: 0
ABDOMINAL PAIN: 0
NAUSEA: 0
COUGH: 1
EYE REDNESS: 0
CONSTIPATION: 0
RHINORRHEA: 0

## 2020-08-26 ASSESSMENT — PAIN DESCRIPTION - PAIN TYPE
TYPE: ACUTE PAIN;CHRONIC PAIN
TYPE: CHRONIC PAIN

## 2020-08-26 ASSESSMENT — PAIN DESCRIPTION - LOCATION
LOCATION: GENERALIZED

## 2020-08-26 ASSESSMENT — PAIN DESCRIPTION - DESCRIPTORS: DESCRIPTORS: CONSTANT

## 2020-08-26 ASSESSMENT — PAIN DESCRIPTION - ONSET: ONSET: ON-GOING

## 2020-08-26 ASSESSMENT — PAIN DESCRIPTION - PROGRESSION: CLINICAL_PROGRESSION: NOT CHANGED

## 2020-08-26 ASSESSMENT — PAIN - FUNCTIONAL ASSESSMENT: PAIN_FUNCTIONAL_ASSESSMENT: ACTIVITIES ARE NOT PREVENTED

## 2020-08-26 NOTE — PROGRESS NOTES
Speech Language Pathology  Dysphagia Treatment & Discharge Note    Name:  Mehdi Velazquez  :   1955  Medical Diagnosis:  Sepsis ([facility] [A41.9]  Sepsis ([facility] [A41.9]  Treatment Diagnosis: Oropharyngeal Dysphagia  Pain level:  No pain reported    Current Diet Level:   Dysphagia III Soft and Bite Sized with thin liquids, meds with puree     Tolerance of Current Diet Level:   Per pt, tolerating current diet without difficulty. Assessment of Texture Tolerance:  Pt positioned upright in bed on RA upon arrival.  Pt agreeable to trials of thin liquids and regular texture. Regular textures revealed prolonged, but effective mastication with eventual oral clearance. Thin liquids tolerated with no overt clinical s/s of aspiration/penetration. At this time recommend to continue with current diet recommendations with discharge from speech therapy this date. Diet and Treatment Recommendations:  Continue Dysphagia III Soft and Bite Sized with thin liquids, meds with puree     Dysphagia Goals:  1.) Pt will tolerate recommended diet without s/s of aspiration (GOAL MET, 2020)  2.) Pt will tolerate diet advance to least restricted diet, as clinically indicated, with no signs of aspiration (GOAL MET, pt at baseline 2020)    Plan:    No further speech therapy for Dysphagia treatment indicated at this time. Patient/Family Education:   Education given to the Pt, who verbalized understanding    Discharge Recommendations:    No further dysphagia tx indicated at this time as pt is at baseline diet. Do not suspect need for SLP dysphagia treatment upon d/c. Please re-consult if difficulty re-occurs. Timed Code Treatment: 0 minutes    Total Treatment Time: 10 minutes    If patient discharges prior to next session this note will serve as a discharge summary. Jesus GONG  CCC-SLP S.P. [id number]  Speech-Language Pathologist

## 2020-08-26 NOTE — PROGRESS NOTES
and updated    No Known Allergies    Social history:     Social History:  All social andepidemiologic history was reviewed and updated by me today as needed. · Tobacco use:   reports that he has been smoking. He has been smoking about 0.25 packs per day. He has never used smokeless tobacco.  · Alcohol use:   reports previous alcohol use. · Currently lives in: [facility], [place]  ·  reports no history of drug use. Assessment:     The patient is a 59 y.o. old male who  has a past medical history of Cirrhosis of liver ([address] with following problems:    · Methicillin susceptible Staphylococcus aureus pneumonia-covered with Zosyn  · Sepsis on admission-sepsis resolved, still has leukocytosis  · Spontaneous bacterial peritonitis-, interestingly, ascitic fluid cultures remain negative  · Decompensated hepatic cirrhosis  · Acute on chronic kidney disease-serum creatinine is 1.8  · Negative HIV screen in August 2018  · Chronic hepatitis C  · History of alcoholism  · Ex-smoker      Discussion:      I had started patient on IV Zosyn and fluconazole yesterday. He seems to be tolerating antibiotic and antifungal okay. He is afebrile. White cell count is 15,600. Ascites fluid culture from 8/24/2020 also remains negative. Sputum culture from 8/20/2020 was positive for MSSA indicating MSSA pneumonia, which is well covered with IV Zosyn.     Serum creatinine is 1.8    Plan:     Diagnostic Workup:    · Follow-up in setting fluid culture from 8/24/2020 and blood culture from 8/23/2020  · Continue to follow  fever curve, WBC count and blood cultures  · Follow up on liver and renal function    Antimicrobials:    · Will continue IV Zosyn 3.375 g every 8 hour  · Continue fluconazole 200 mg every 24 hour  · Continue to monitor his vitals closely  · We will follow-up on the culture results and clinical progress and will make further recommendations accordingly  · Aspiration precautions  · Cough and deep breathing exercises  · DVT prophylaxis  · Discussed all above with patient and RN      Drug Monitoring:    · Continue monitoring for antibiotic toxicity as follows: CBC, CMP, QTc interval  · Continue to watch for following: new or worsening fever, new hypotension, hives, lip swelling and redness or purulence at vascular access sites. I/v access Management:    · Continue to monitor i.v access sites for erythema, induration, discharge or tenderness. · As always, continue efforts to minimize tubes/lines/drains as clinically appropriate to reduce chances of line associated infections. Patient education and counseling:        · The patient was educated in detail about the side-effects of various antibiotics and things to watch for like new rashes, lip swelling, severe reaction, worsening diarrhea, break through fever etc.  · Discussed patient's condition and what to expect. All of the patient's questions were addressed in a satisfactory manner and patient verbalized understanding all instructions. Level of complexity of visit: High     Risk of Complications/Morbidity: High       TIME SPENT TODAY:     - Spent over  36 minutes on visit (including interval history, physical exam, review of data including labs, cultures, imaging, development and implementation of treatment plan and coordination of complex care). Thank you for involving me in the care of your patient. I will continue to follow. If you have anyadditional questions, please do not hesitate to contact me. Subjective: Interval history: Interval history was obtained from chart review and RN. The patient is afebrile today. He is tolerating antibiotics okay. No diarrhea. REVIEW OF SYSTEMS:     Review of Systems   Constitutional: Negative for chills, diaphoresis and fever. HENT: Negative for ear discharge, ear pain, rhinorrhea, sore throat and trouble swallowing. Eyes: Negative for discharge and redness.    Respiratory: Positive for cough and shortness of breath. Negative for wheezing. Cardiovascular: Negative for chest pain and leg swelling. Gastrointestinal: Negative for abdominal pain, constipation, diarrhea and nausea. Endocrine: Negative for polyuria. Genitourinary: Negative for dysuria, flank pain, frequency, hematuria and urgency. Musculoskeletal: Negative for back pain and myalgias. Skin: Negative for rash. Neurological: Negative for dizziness, seizures and headaches. Hematological: Does not bruise/bleed easily. Psychiatric/Behavioral: Negative for hallucinations and suicidal ideas. All other systems reviewed and are negative. Past Medical History: All past medical history reviewed today. Past Medical History:   Diagnosis Date    Cirrhosis of liver ([address]     aug 2018       Past Surgical History: All past surgical history was reviewed today. History reviewed. No pertinent surgical history. Family History: All family history was reviewed today. Problem Relation Age of Onset    High Blood Pressure Mother        Objective:       PHYSICAL EXAM:      Vitals:   Vitals:    08/26/20 0834 08/26/20 0915 08/26/20 1145 08/26/20 1210   BP:  108/66  116/69   Pulse:  90  91   Resp: 16 16 16 18   Temp:  98.3 °F (36.8 °C)  98.3 °F (36.8 °C)   TempSrc:  Oral  Oral   SpO2: 94% 94% 93% 96%   Weight:       Height:           Physical Exam  Vitals signs and nursing note reviewed. Constitutional:       Appearance: Normal appearance. He is well-developed. HENT:      Head: Normocephalic and atraumatic. Right Ear: External ear normal.      Left Ear: External ear normal.      Nose: Nose normal. No congestion or rhinorrhea. Mouth/Throat:      Mouth: Mucous membranes are moist.      Pharynx: No oropharyngeal exudate or posterior oropharyngeal erythema. Eyes:      General: No scleral icterus. Right eye: No discharge. Left eye: No discharge.       Conjunctiva/sclera: Conjunctivae normal. Pupils: Pupils are equal, round, and reactive to light. Neck:      Musculoskeletal: Normal range of motion and neck supple. No neck rigidity or muscular tenderness. Cardiovascular:      Rate and Rhythm: Normal rate and regular rhythm. Pulses: Normal pulses. Heart sounds: No murmur. No friction rub. Pulmonary:      Effort: Pulmonary effort is normal. No respiratory distress. Breath sounds: No stridor. Rales (Bilateral, patchy) present. No wheezing or rhonchi. Abdominal:      General: Bowel sounds are normal.      Palpations: Abdomen is soft. Tenderness: There is no abdominal tenderness. There is no right CVA tenderness, left CVA tenderness, guarding or rebound. Musculoskeletal: Normal range of motion. General: No swelling or tenderness. Lymphadenopathy:      Cervical: No cervical adenopathy. Skin:     General: Skin is warm and dry. Coloration: Skin is not jaundiced. Findings: No erythema or rash. Neurological:      General: No focal deficit present. Mental Status: He is alert and oriented to person, place, and time. Mental status is at baseline. Motor: No abnormal muscle tone. Psychiatric:         Mood and Affect: Mood normal.         Behavior: Behavior normal.         Thought Content: Thought content normal.           Lines: All vascular access sites are healthy with no local erythema, discharge or tenderness. Intake and output:    I/O last 3 completed shifts: In: 5303 [P.O.:240; I.V.:834; IV Piggyback:80]  Out: 300 [Urine:300]    Lab Data:   All available labs and old records have been reviewed by me.     CBC:  Recent Labs     08/24/20  0504 08/25/20  0516 08/26/20  0522   WBC 17.6* 15.2* 15.6*   RBC 2.46* 2.30* 2.18*   HGB 9.5* 8.8* 8.3*   HCT 27.4* 25.3* 24.3*   PLT 90* 81* 87*   .5* 110.3* 111.2*   MCH 38.5* 38.3* 38.2*   MCHC 34.6 34.7 34.3   RDW 16.2* 16.1* 15.9*   BANDSPCT 2  --   --         BMP:  Recent Labs     08/24/20  0504 08/25/20  0516 08/26/20  0522   * 133* 132*   K 3.7 3.9 3.5    99 98*   CO2 25 24 24   BUN 27* 28* 33*   CREATININE 1.1 1.5* 1.8*   CALCIUM 8.1* 7.7* 7.9*   GLUCOSE 104* 90 108*        Hepatic Function Panel:   Lab Results   Component Value Date    ALKPHOS 56 08/26/2020    ALT 19 08/26/2020    AST 39 08/26/2020    PROT 5.5 08/26/2020    PROT 6.5 08/10/2010    BILITOT 16.8 08/26/2020    BILIDIR 10.0 08/26/2020    IBILI 6.8 08/26/2020    LABALBU 2.5 08/26/2020       CPK:   Lab Results   Component Value Date    CKTOTAL 144 08/10/2010     ESR: No results found for: SEDRATE  CRP: No results found for: CRP        Imaging: All pertinent images and reports for the current visit were reviewed by me during this visit. IR US GUIDED PARACENTESIS   Final Result   Successful ultrasound guided paracentesis. XR CHEST PORTABLE   Final Result   Stable appearance of left-sided pleuroparenchymal disease         IR US GUIDED PARACENTESIS   Final Result   Successful ultrasound guided paracentesis. US GALLBLADDER RUQ   Final Result   Heterogeneous nodular appearance of the liver compatible with known cirrhosis. Gallbladder and common duct appear grossly unremarkable in appearance. Moderate amount of ascites. XR CHEST PORTABLE   Final Result   Increasing pleuroparenchymal disease on the left. XR ACUTE ABD SERIES CHEST 1 VW    (Results Pending)       Medications: All current and past medications were reviewed.      polyethylene glycol  17 g Oral BID    piperacillin-tazobactam  3.375 g Intravenous Q8H    fluconazole  200 mg Intravenous Q24H    midodrine  5 mg Oral TID WC    ipratropium-albuterol  1 ampule Inhalation Q4H WA    sodium chloride flush  10 mL Intravenous 2 times per day           magnesium sulfate, simethicone, potassium chloride **OR** potassium alternative oral replacement **OR** potassium chloride, calcium carbonate, HYDROmorphone, sodium chloride flush, acetaminophen **OR** acetaminophen, senna, ondansetron **OR** ondansetron, perflutren lipid microspheres      Problem list:       Patient Active Problem List   Diagnosis Code    Cirrhosis ([facility] K74.60    Anasarca R60.1    HCAP (healthcare-associated pneumonia) J18.9    Severe sepsis ([address] A41.9, R65.20    Acute kidney injury ([address] N17.9    Anemia D64.9    Thrombocytopenia ([address] D69.6    Coagulopathy ([address] B77.0    Alcoholic cirrhosis ([address] U47.08    Hyponatremia E87.1    Moderate malnutrition ([address] E44.0    Pneumonia due to methicillin susceptible Staphylococcus aureus (MSSA) ([address] J15.211    SBP (spontaneous bacterial peritonitis) ([address] K65.2    Chronic hepatitis C without hepatic coma ([address] B18.2    History of alcoholism ([address] F10.21    Ex-smoker E44.490       Please note that this chart was generated using Dragon dictation software. Although every effort was made to ensure the accuracy of this automated transcription, some errors in transcription may have occurred inadvertently. If you may need any clarification, please do not hesitate to contact me through EPIC or at the phone number provided below with my electronic signature. Any pictures or media included in this note were obtained after taking informed verbal consent from the patient and with their approval to include those in the patient's medical record.     Jamison Burgess MD, MPH  8/26/2020 , 12:47 PM   [facility]   Office: [phone]  Fax: [phone]  Tuesday AM clinic:   [address] [place] [address], [facility] [address]  Thursday AM [id number] [facility]

## 2020-08-26 NOTE — PROGRESS NOTES
[facility]  Gastroenterology Progress Note    Mehdi Velazquez is a 59 y.o. male patient. 1. Severe sepsis ([facility]    2. Pneumonia due to organism    3. Acute kidney injury ([facility]        SUBJECTIVE:  Has abdominal bloating and pain - stable. No N/V. tolerating diet. No BM since Saturday. ROS:  Cardiovascular ROS: no chest pain or dyspnea on exertion  Gastrointestinal ROS: see hpi  Respiratory ROS: no cough, shortness of breath, or wheezing    Physical    VITALS:  /66   Pulse 90   Temp 98.3 °F (36.8 °C) (Oral)   Resp 16   Ht 6' 2\" (1.88 m)   Wt 210 lb 8 oz (95.5 kg)   SpO2 94%   BMI 27.03 kg/m²   TEMPERATURE:  Current - Temp: 98.3 °F (36.8 °C); Max - Temp  Av.5 °F (36.9 °C)  Min: 98.3 °F (36.8 °C)  Max: 98.7 °F (37.1 °C)    NAD  RRR, Nl s1s2  Lungs CTA Bilaterally, normal effort  Abdomen soft, mild distention, mild diffuse tenderness, no HSM, Bowel sounds present  AAOx3, No asterixis   edema BLE (decreased)    Data    Data Review:    Recent Labs     20  0504 20  0516 20  05   WBC 17.6* 15.2* 15.6*   HGB 9.5* 8.8* 8.3*   HCT 27.4* 25.3* 24.3*   .5* 110.3* 111.2*   PLT 90* 81* 87*     Recent Labs     20  0504 20  0516 20  05   * 133* 132*   K 3.7 3.9 3.5    99 98*   CO2  24   BUN 27* 28* 33*   CREATININE 1.1 1.5* 1.8*     Recent Labs     20  0504 20  0516 20  0522   AST 48* 41* 39*   ALT 26 21 19   BILIDIR 7.6* 8.8* 10.0*   BILITOT 13.6* 14.2* 16.8*   ALKPHOS 73 59 56     No results for input(s): LIPASE, AMYLASE in the last 72 hours. Recent Labs     20  0504 20  0516 20  0522   PROTIME 23.1* 36.4* 30.9*   INR 1.98* 3.10* 2.64*     No results for input(s): PTT in the last 72 hours. ASSESSMENT / PLAN      Cirrhosis - due to alcohol abuse. No GI bleeding. No prior EGD. Worsening hepatic function since  with leukocytosis due to SBP. Afeb. S/p para with 3.5 L fluid removed .

## 2020-08-26 NOTE — PROGRESS NOTES
tablet 4 mg, Q8H PRN    Or  ondansetron (ZOFRAN) injection 4 mg, Q6H PRN  perflutren lipid microspheres (DEFINITY) injection 1.65 mg, ONCE PRN        Objective:  /66   Pulse 90   Temp 98.3 °F (36.8 °C) (Oral)   Resp 16   Ht 6' 2\" (1.88 m)   Wt 210 lb 8 oz (95.5 kg)   SpO2 93%   BMI 27.03 kg/m²     Intake/Output Summary (Last 24 hours) at 8/26/2020 1205  Last data filed at 8/26/2020 0915  Gross per 24 hour   Intake 1434 ml   Output 300 ml   Net 1134 ml      Wt Readings from Last 3 Encounters:   08/26/20 210 lb 8 oz (95.5 kg)   08/13/20 229 lb 6.4 oz (104.1 kg)   08/06/20 228 lb (103.4 kg)       General appearance: -American gentleman appears comfortable  Eyes: Sclera clear. Pupils equal.  ENT: Moist oral mucosa. Trachea midline, no adenopathy, neck supple. Cardiovascular: Regular rhythm, normal S1, S2. No murmur. No edema in lower extremities  Respiratory: Not using accessory muscles. Good inspiratory effort. Clear to auscultation bilaterally, no wheeze or crackles. GI: Abdomen soft, some tenderness, distended due to ascites, normal bowel sounds  Musculoskeletal: No deformity, ROM WNL. Neurology: CN 2-12 grossly intact. No speech or motor deficits  Psych: Normal affect.  Alert and oriented in time, place and person  Skin: Warm, dry, normal turgor  Extremities nonpitting edema noted  Labs and Tests:  CBC:   Recent Labs     08/24/20  0504 08/25/20  0516 08/26/20  0522   WBC 17.6* 15.2* 15.6*   HGB 9.5* 8.8* 8.3*   HCT 27.4* 25.3* 24.3*   .5* 110.3* 111.2*   PLT 90* 81* 87*     BMP:    Recent Labs     08/24/20  0504 08/25/20  0516 08/26/20  0522   * 133* 132*   K 3.7 3.9 3.5    99 98*   CO2 25 24 24   BUN 27* 28* 33*   CREATININE 1.1 1.5* 1.8*   GLUCOSE 104* 90 108*     Hepatic:   Recent Labs     08/24/20  0504 08/25/20  0516 08/26/20  0522   AST 48* 41* 39*   ALT 26 21 19   BILITOT 13.6* 14.2* 16.8*   ALKPHOS 73 59 56         Problem List  Principal Problem:    Severe sepsis [facility])  Active Problems:    Cirrhosis ([facility]    Anasarca    HCAP (healthcare-associated pneumonia)    Acute kidney injury ([facility]    Anemia    Thrombocytopenia (HCC)    Coagulopathy (HCC)    Alcoholic cirrhosis ([facility]    Hyponatremia    Moderate malnutrition (HCC)    Pneumonia due to methicillin susceptible Staphylococcus aureus (MSSA) (HCC)    SBP (spontaneous bacterial peritonitis) ([facility]    Chronic hepatitis C without hepatic coma (HCC)    History of alcoholism ([facility]    Ex-smoker  Resolved Problems:    * No resolved hospital problems. *     Assessment & Plan:      1.  Alcoholic cirrhosis: Gastroenterology service wants to repeat  another paracentesis yesterday with 4 L removed,  Patient continues to have worsening liver function as evidenced by increasing INR, bilirubin, worsening hyponatremia  This time GI recommends monitoring for improvement with change of antibiotic. 2.  Ascites status post paracentesis on 8/19 3.7 L fluid removed, and again 4 L on 8/24 Lasix held in view of worsening renal function   3.  Patient is on empiric antibiotic treatment with cefepime vancomycin for possible H CAP, will check follow-up chest x-ray portable today and procalcitonin levels, however, white count continues to go up and patient is still with abdominal pain. We will consult infectious disease for recommendation on continued antibiotic therapy. 4.  Acute kidney injury on chronic kidney disease, creatinine 1.6--> 1.4--> 1.3 went up to 1.5 again today, nephrology consulted: Prerenal KACEY as well as hepatorenal syndrome as well as compartment syndrome from ascitic fluid are considered. Holding Lasix and albumin administration are the first therapies to be attempted  5.  PT OT evaluation done, home health care versus skilled nursing care facility placement. Prognosis for this patient is guarded. High risk of poor outcome if hepatic failure continues to progress.     Diet: DIET LOW SODIUM 2 GM;  Dysphagia Soft and Bite-Sized; 1200 ml  Code:DNR-CCA  DVT PPX lovenox       Chloé Fowler MD   8/26/2020 12:05 PM

## 2020-08-26 NOTE — PROGRESS NOTES
Resting quietly in bed, eyes closed, respirations even, responded to verba stimuli. C/O level 10/10 generalized pain, no nausea/vominting. Denies SOB or diff breathing. VSS. Assessment completed, see flow charts. No distress noted. natalie

## 2020-08-27 ENCOUNTER — APPOINTMENT (OUTPATIENT)
Dept: INTERVENTIONAL RADIOLOGY/VASCULAR | Age: 65
DRG: 871 | End: 2020-08-27
Payer: MEDICARE

## 2020-08-27 LAB
A/G RATIO: 1.1 (ref 1.1–2.2)
ALBUMIN SERPL-MCNC: 3 G/DL (ref 3.4–5)
ALP BLD-CCNC: 50 U/L (ref 40–129)
ALT SERPL-CCNC: 17 U/L (ref 10–40)
ANION GAP SERPL CALCULATED.3IONS-SCNC: 11 MMOL/L (ref 3–16)
ANISOCYTOSIS: ABNORMAL
APPEARANCE FLUID: NORMAL
APTT: 76.4 SEC (ref 24.2–36.2)
AST SERPL-CCNC: 38 U/L (ref 15–37)
BANDED NEUTROPHILS RELATIVE PERCENT: 1 % (ref 0–7)
BASOPHILS ABSOLUTE: 0 K/UL (ref 0–0.2)
BASOPHILS RELATIVE PERCENT: 0 %
BILIRUB SERPL-MCNC: 17.7 MG/DL (ref 0–1)
BLOOD CULTURE, ROUTINE: NORMAL
BODY FLUID CULTURE, STERILE: NORMAL
BUN BLDV-MCNC: 40 MG/DL (ref 7–20)
CALCIUM SERPL-MCNC: 8.3 MG/DL (ref 8.3–10.6)
CELL COUNT FLUID TYPE: NORMAL
CHLORIDE BLD-SCNC: 99 MMOL/L (ref 99–110)
CLOT EVALUATION: NORMAL
CO2: 23 MMOL/L (ref 21–32)
COLOR FLUID: NORMAL
CREAT SERPL-MCNC: 2.6 MG/DL (ref 0.8–1.3)
D DIMER: 2895 NG/ML DDU (ref 0–229)
EOSINOPHILS ABSOLUTE: 0 K/UL (ref 0–0.6)
EOSINOPHILS RELATIVE PERCENT: 0 %
FIBRINOGEN: 99 MG/DL (ref 200–397)
GFR AFRICAN AMERICAN: 30
GFR NON-AFRICAN AMERICAN: 25
GLOBULIN: 2.8 G/DL
GLUCOSE BLD-MCNC: 78 MG/DL (ref 70–99)
GRAM STAIN RESULT: NORMAL
HCT VFR BLD CALC: 23.6 % (ref 40.5–52.5)
HEMOGLOBIN: 8 G/DL (ref 13.5–17.5)
INR BLD: 2.98 (ref 0.86–1.14)
LYMPHOCYTES ABSOLUTE: 1.3 K/UL (ref 1–5.1)
LYMPHOCYTES RELATIVE PERCENT: 8 %
LYMPHOCYTES, BODY FLUID: 3 %
MACROCYTES: ABNORMAL
MACROPHAGE FLUID: 24 %
MCH RBC QN AUTO: 37.2 PG (ref 26–34)
MCHC RBC AUTO-ENTMCNC: 33.7 G/DL (ref 31–36)
MCV RBC AUTO: 110.4 FL (ref 80–100)
METAMYELOCYTES RELATIVE PERCENT: 1 %
MONOCYTES ABSOLUTE: 1 K/UL (ref 0–1.3)
MONOCYTES RELATIVE PERCENT: 6 %
NEUTROPHIL, FLUID: 73 %
NEUTROPHILS ABSOLUTE: 14.2 K/UL (ref 1.7–7.7)
NEUTROPHILS RELATIVE PERCENT: 84 %
NUCLEATED CELLS FLUID: 666 /CUMM
NUMBER OF CELLS COUNTED FLUID: 100
OVALOCYTES: ABNORMAL
PDW BLD-RTO: 15.8 % (ref 12.4–15.4)
PLATELET # BLD: 85 K/UL (ref 135–450)
PLATELET SLIDE REVIEW: ABNORMAL
PMV BLD AUTO: 9.4 FL (ref 5–10.5)
POTASSIUM REFLEX MAGNESIUM: 3.8 MMOL/L (ref 3.5–5.1)
PROTHROMBIN TIME: 35 SEC (ref 10–13.2)
RBC # BLD: 2.14 M/UL (ref 4.2–5.9)
RBC FLUID: NORMAL /CUMM
SLIDE REVIEW: ABNORMAL
SMUDGE CELLS: PRESENT
SODIUM BLD-SCNC: 133 MMOL/L (ref 136–145)
SODIUM URINE: <20 MMOL/L
TARGET CELLS: ABNORMAL
TOTAL PROTEIN: 5.8 G/DL (ref 6.4–8.2)
VANCOMYCIN RANDOM: 15.4 UG/ML
WBC # BLD: 16.5 K/UL (ref 4–11)

## 2020-08-27 PROCEDURE — 85384 FIBRINOGEN ACTIVITY: CPT

## 2020-08-27 PROCEDURE — 6370000000 HC RX 637 (ALT 250 FOR IP): Performed by: INTERNAL MEDICINE

## 2020-08-27 PROCEDURE — 49083 ABD PARACENTESIS W/IMAGING: CPT

## 2020-08-27 PROCEDURE — 87070 CULTURE OTHR SPECIMN AEROBIC: CPT

## 2020-08-27 PROCEDURE — 85730 THROMBOPLASTIN TIME PARTIAL: CPT

## 2020-08-27 PROCEDURE — 2060000000 HC ICU INTERMEDIATE R&B

## 2020-08-27 PROCEDURE — 0W9G3ZZ DRAINAGE OF PERITONEAL CAVITY, PERCUTANEOUS APPROACH: ICD-10-PCS | Performed by: RADIOLOGY

## 2020-08-27 PROCEDURE — 99233 SBSQ HOSP IP/OBS HIGH 50: CPT | Performed by: INTERNAL MEDICINE

## 2020-08-27 PROCEDURE — 87205 SMEAR GRAM STAIN: CPT

## 2020-08-27 PROCEDURE — 89051 BODY FLUID CELL COUNT: CPT

## 2020-08-27 PROCEDURE — 6370000000 HC RX 637 (ALT 250 FOR IP): Performed by: PHYSICIAN ASSISTANT

## 2020-08-27 PROCEDURE — P9047 ALBUMIN (HUMAN), 25%, 50ML: HCPCS | Performed by: INTERNAL MEDICINE

## 2020-08-27 PROCEDURE — C1729 CATH, DRAINAGE: HCPCS

## 2020-08-27 PROCEDURE — 85610 PROTHROMBIN TIME: CPT

## 2020-08-27 PROCEDURE — 87081 CULTURE SCREEN ONLY: CPT

## 2020-08-27 PROCEDURE — 80053 COMPREHEN METABOLIC PANEL: CPT

## 2020-08-27 PROCEDURE — 85379 FIBRIN DEGRADATION QUANT: CPT

## 2020-08-27 PROCEDURE — 85025 COMPLETE CBC W/AUTO DIFF WBC: CPT

## 2020-08-27 PROCEDURE — 94760 N-INVAS EAR/PLS OXIMETRY 1: CPT

## 2020-08-27 PROCEDURE — 6360000002 HC RX W HCPCS: Performed by: INTERNAL MEDICINE

## 2020-08-27 PROCEDURE — 84300 ASSAY OF URINE SODIUM: CPT

## 2020-08-27 PROCEDURE — 36415 COLL VENOUS BLD VENIPUNCTURE: CPT

## 2020-08-27 PROCEDURE — 2500000003 HC RX 250 WO HCPCS: Performed by: INTERNAL MEDICINE

## 2020-08-27 PROCEDURE — 2580000003 HC RX 258: Performed by: HOSPITALIST

## 2020-08-27 PROCEDURE — 80202 ASSAY OF VANCOMYCIN: CPT

## 2020-08-27 PROCEDURE — 94640 AIRWAY INHALATION TREATMENT: CPT

## 2020-08-27 PROCEDURE — 2580000003 HC RX 258: Performed by: INTERNAL MEDICINE

## 2020-08-27 RX ORDER — LIDOCAINE HYDROCHLORIDE 10 MG/ML
5 INJECTION, SOLUTION EPIDURAL; INFILTRATION; INTRACAUDAL; PERINEURAL ONCE
Status: COMPLETED | OUTPATIENT
Start: 2020-08-27 | End: 2020-08-27

## 2020-08-27 RX ORDER — OXYCODONE HYDROCHLORIDE 5 MG/1
10 TABLET ORAL EVERY 4 HOURS PRN
Status: DISCONTINUED | OUTPATIENT
Start: 2020-08-27 | End: 2020-08-29 | Stop reason: HOSPADM

## 2020-08-27 RX ORDER — OXYCODONE HYDROCHLORIDE 5 MG/1
5 TABLET ORAL EVERY 4 HOURS PRN
Status: DISCONTINUED | OUTPATIENT
Start: 2020-08-27 | End: 2020-08-29 | Stop reason: HOSPADM

## 2020-08-27 RX ORDER — ALBUMIN (HUMAN) 12.5 G/50ML
12.5 SOLUTION INTRAVENOUS EVERY 6 HOURS
Status: DISCONTINUED | OUTPATIENT
Start: 2020-08-27 | End: 2020-08-29

## 2020-08-27 RX ORDER — MIDODRINE HYDROCHLORIDE 5 MG/1
10 TABLET ORAL
Status: DISCONTINUED | OUTPATIENT
Start: 2020-08-27 | End: 2020-08-29 | Stop reason: HOSPADM

## 2020-08-27 RX ORDER — MIDODRINE HYDROCHLORIDE 5 MG/1
5 TABLET ORAL ONCE
Status: COMPLETED | OUTPATIENT
Start: 2020-08-27 | End: 2020-08-27

## 2020-08-27 RX ORDER — LACTOBACILLUS RHAMNOSUS GG 10B CELL
1 CAPSULE ORAL 2 TIMES DAILY WITH MEALS
Status: DISCONTINUED | OUTPATIENT
Start: 2020-08-27 | End: 2020-08-29 | Stop reason: HOSPADM

## 2020-08-27 RX ADMIN — IPRATROPIUM BROMIDE AND ALBUTEROL SULFATE 1 AMPULE: .5; 3 SOLUTION RESPIRATORY (INHALATION) at 08:51

## 2020-08-27 RX ADMIN — HYDROMORPHONE HYDROCHLORIDE 1 MG: 1 INJECTION, SOLUTION INTRAMUSCULAR; INTRAVENOUS; SUBCUTANEOUS at 00:07

## 2020-08-27 RX ADMIN — PIPERACILLIN AND TAZOBACTAM 3.38 G: 3; .375 INJECTION, POWDER, LYOPHILIZED, FOR SOLUTION INTRAVENOUS at 12:44

## 2020-08-27 RX ADMIN — IPRATROPIUM BROMIDE AND ALBUTEROL SULFATE 1 AMPULE: .5; 3 SOLUTION RESPIRATORY (INHALATION) at 21:31

## 2020-08-27 RX ADMIN — OXYCODONE 5 MG: 5 TABLET ORAL at 22:09

## 2020-08-27 RX ADMIN — AMPICILLIN SODIUM AND SULBACTAM SODIUM 1.5 G: 1; .5 INJECTION, POWDER, FOR SOLUTION INTRAMUSCULAR; INTRAVENOUS at 20:18

## 2020-08-27 RX ADMIN — LIDOCAINE HYDROCHLORIDE 5 ML: 10 INJECTION, SOLUTION EPIDURAL; INFILTRATION; INTRACAUDAL; PERINEURAL at 15:30

## 2020-08-27 RX ADMIN — Medication 10 ML: at 11:52

## 2020-08-27 RX ADMIN — ALBUMIN (HUMAN) 12.5 G: 0.25 INJECTION, SOLUTION INTRAVENOUS at 18:50

## 2020-08-27 RX ADMIN — MIDODRINE HYDROCHLORIDE 10 MG: 5 TABLET ORAL at 16:44

## 2020-08-27 RX ADMIN — HYDROMORPHONE HYDROCHLORIDE 1 MG: 1 INJECTION, SOLUTION INTRAMUSCULAR; INTRAVENOUS; SUBCUTANEOUS at 04:15

## 2020-08-27 RX ADMIN — IPRATROPIUM BROMIDE AND ALBUTEROL SULFATE 1 AMPULE: .5; 3 SOLUTION RESPIRATORY (INHALATION) at 12:19

## 2020-08-27 RX ADMIN — Medication 10 ML: at 08:55

## 2020-08-27 RX ADMIN — POLYETHYLENE GLYCOL 3350 17 G: 17 POWDER, FOR SOLUTION ORAL at 08:55

## 2020-08-27 RX ADMIN — FLUCONAZOLE 200 MG: 2 INJECTION, SOLUTION INTRAVENOUS at 16:44

## 2020-08-27 RX ADMIN — IPRATROPIUM BROMIDE AND ALBUTEROL SULFATE 1 AMPULE: .5; 3 SOLUTION RESPIRATORY (INHALATION) at 16:54

## 2020-08-27 RX ADMIN — OXYCODONE 10 MG: 5 TABLET ORAL at 18:50

## 2020-08-27 RX ADMIN — PIPERACILLIN AND TAZOBACTAM 3.38 G: 3; .375 INJECTION, POWDER, LYOPHILIZED, FOR SOLUTION INTRAVENOUS at 00:07

## 2020-08-27 RX ADMIN — HYDROMORPHONE HYDROCHLORIDE 1 MG: 1 INJECTION, SOLUTION INTRAMUSCULAR; INTRAVENOUS; SUBCUTANEOUS at 15:44

## 2020-08-27 RX ADMIN — HYDROMORPHONE HYDROCHLORIDE 1 MG: 1 INJECTION, SOLUTION INTRAMUSCULAR; INTRAVENOUS; SUBCUTANEOUS at 20:18

## 2020-08-27 RX ADMIN — VANCOMYCIN HYDROCHLORIDE 250 MG: KIT at 20:18

## 2020-08-27 RX ADMIN — Medication 1 CAPSULE: at 16:44

## 2020-08-27 RX ADMIN — MIDODRINE HYDROCHLORIDE 5 MG: 5 TABLET ORAL at 10:37

## 2020-08-27 RX ADMIN — MIDODRINE HYDROCHLORIDE 5 MG: 5 TABLET ORAL at 08:54

## 2020-08-27 RX ADMIN — PIPERACILLIN AND TAZOBACTAM 3.38 G: 3; .375 INJECTION, POWDER, LYOPHILIZED, FOR SOLUTION INTRAVENOUS at 06:26

## 2020-08-27 RX ADMIN — ALBUMIN (HUMAN) 12.5 G: 0.25 INJECTION, SOLUTION INTRAVENOUS at 15:43

## 2020-08-27 RX ADMIN — HYDROMORPHONE HYDROCHLORIDE 1 MG: 1 INJECTION, SOLUTION INTRAMUSCULAR; INTRAVENOUS; SUBCUTANEOUS at 11:49

## 2020-08-27 RX ADMIN — Medication 10 ML: at 20:19

## 2020-08-27 RX ADMIN — MIDODRINE HYDROCHLORIDE 10 MG: 5 TABLET ORAL at 12:44

## 2020-08-27 RX ADMIN — Medication 10 ML: at 15:44

## 2020-08-27 ASSESSMENT — PAIN SCALES - GENERAL
PAINLEVEL_OUTOF10: 10
PAINLEVEL_OUTOF10: 0
PAINLEVEL_OUTOF10: 10
PAINLEVEL_OUTOF10: 7

## 2020-08-27 ASSESSMENT — PAIN DESCRIPTION - LOCATION
LOCATION: GENERALIZED

## 2020-08-27 ASSESSMENT — PAIN DESCRIPTION - ONSET: ONSET: ON-GOING

## 2020-08-27 ASSESSMENT — ENCOUNTER SYMPTOMS
NAUSEA: 0
COUGH: 1
EYE DISCHARGE: 0
DIARRHEA: 0
WHEEZING: 0
EYE REDNESS: 0
ABDOMINAL DISTENTION: 1
SORE THROAT: 0
SHORTNESS OF BREATH: 0
BACK PAIN: 0
CONSTIPATION: 0
TROUBLE SWALLOWING: 0
RHINORRHEA: 0
ABDOMINAL PAIN: 1

## 2020-08-27 ASSESSMENT — PAIN DESCRIPTION - PAIN TYPE
TYPE: CHRONIC PAIN

## 2020-08-27 ASSESSMENT — PAIN DESCRIPTION - DESCRIPTORS: DESCRIPTORS: CONSTANT

## 2020-08-27 NOTE — ED NOTES
Per PA - pt ok to eat. Low sodium tray ordered. Pt with 475ml urine out. Pt updated on POC. Pt positioned for comfort. Call light in reach. Bed locked and in low position. Pt denies any needs or concerns at this time.      Tammy Luna RN  04/18/19 [phone]

## 2020-08-27 NOTE — PROGRESS NOTES
5400ml of fluid removed  Spoke to patients RN hugh and advised him the amount of fluid removed and that patient was returning to the floor.

## 2020-08-27 NOTE — PROGRESS NOTES
Infectious Diseases   Progress Note      Admission Date: 8/17/2020  Hospital Day: Hospital Day: 11   Attending: Sergey Akins MD  Date of service: 8/27/2020     Chief complaint/ Reason for consult:     · Methicillin susceptible Staphylococcus aureus pneumonia  · Sepsis on admission  · Spontaneous bacterial peritonitis  · Decompensated hepatic cirrhosis    Microbiology:        I have reviewed allavailable micro lab data and cultures    · Blood culture (2/2) - collected on 8/17/2020: Negative  · Sputum culture - collectedon 8/20/2020: MSSA  · Acetic fluid culture  - collected on 8/19/20  and 8/24/2020: Negative so far    Results for Leanor Sides (MRN [id number]) as of 8/25/2020 14:25   Ref. Range 8/19/2020 14:00 8/24/2020 09:45   Source + CELL CT/DIFF-BF Unknown Ascitic Fluid Ascitic Fluid   Appearance, Fluid Unknown Hazy Cloudy   Color, Fluid Unknown Yellow Yellow   Fluid Type Unknown Ascitic Fluid    RBC, Fluid Latest Units: /cumm 2,200 5,200   Lymphocytes, Body Fluid Latest Units: % 2 3   Neutrophil Count, Fluid Latest Units: % 47 85   Nucl Cell, Fluid Latest Units: /cumm 485 491   Protein, Fluid Latest Ref Range: Not Established g/dL 1.7    Albumin, Fluid Latest Ref Range: Not Established g/dL 0.6    Clot Eval. Unknown see below see below   Number of Cells Counted Fluid Unknown 100 100     Antibiotics and immunizations:       Current antibiotics: All antibiotics and their doses were reviewed by me    Recent Abx Admin                   piperacillin-tazobactam (ZOSYN) 3.375 g in dextrose 5 % 50 mL IVPB extended infusion (mini-bag) (g) 3.375 g New Bag 08/27/20 1244     3.375 g New Bag  0626     3.375 g New Bag  0007     3.375 g New Bag 08/26/20 1430    fluconazole (DIFLUCAN) in 0.9 % sodium chloride IVPB 200 mg (mg) 200 mg New Bag 08/26/20 1922                  Immunization History: All immunization history was reviewed by me today.       There is no immunization history on file for this patient. Known drug allergies: All allergies were reviewed and updated    No Known Allergies    Social history:     Social History:  All social andepidemiologic history was reviewed and updated by me today as needed. · Tobacco use:   reports that he has been smoking. He has been smoking about 0.25 packs per day. He has never used smokeless tobacco.  · Alcohol use:   reports previous alcohol use. · Currently lives in: [facility]  ·  reports no history of drug use. Assessment:     The patient is a 59 y.o. old male who  has a past medical history of Cirrhosis of liver ([address] 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 His white cell count is 16,500. Blood cultures from 8/23/2020 remain negative. Ascitic fluid culture from 8/19/2020 and 8/24/2020 have been negative. White cell count 16,500. Platelet count is 92,999 due to thrombocytopenia in setting of hypersplenism and cirrhosis. INR is 2.98 in setting of cirrhosis    He had an x-ray of acute abdomen done yesterday which indicated possible generalized ileus. He also has left pleural effusion in setting of hepatic anasarca.   MSSA empyema cannot be completely ruled out in setting of leukocytosis, however, would be unusual without any manner and patient verbalized understanding all instructions. Level of complexity of visit: High     Risk of Complications/Morbidity: High     · Illness(es)/ Infection present that pose threat to life/bodily function. · There is potential for severe exacerbation of infection/side effects of treatment. · Therapy requires intensive monitoring for antimicrobial agent toxicity. Thank you for involving me in the care of your patient. I will continue to follow. If you have anyadditional questions, please do not hesitate to contact me. Subjective: Interval history: Interval history was obtained from chart review and RN. He is afebrile. Seems to be tolerating antibiotics okay. Has abdominal distention and occasional pain     REVIEW OF SYSTEMS:     Review of Systems   Constitutional: Negative for chills, diaphoresis and fever. HENT: Negative for ear discharge, ear pain, rhinorrhea, sore throat and trouble swallowing. Eyes: Negative for discharge and redness. Respiratory: Positive for cough. Negative for shortness of breath and wheezing. Cardiovascular: Negative for chest pain and leg swelling. Gastrointestinal: Positive for abdominal distention and abdominal pain. Negative for constipation, diarrhea and nausea. Endocrine: Negative for polyuria. Genitourinary: Negative for dysuria, flank pain, frequency, hematuria and urgency. Musculoskeletal: Negative for back pain and myalgias. Skin: Negative for rash. Neurological: Negative for dizziness, seizures and headaches. Hematological: Does not bruise/bleed easily. Psychiatric/Behavioral: Negative for hallucinations and suicidal ideas. All other systems reviewed and are negative. Past Medical History: All past medical history reviewed today. Past Medical History:   Diagnosis Date    Cirrhosis of liver ([facility] [address]     aug 2018       Past Surgical History: All past surgical history was reviewed today. History reviewed.  No pertinent surgical history. Family History: All family history was reviewed today. Problem Relation Age of Onset    High Blood Pressure Mother        Objective:       PHYSICAL EXAM:      Vitals:   Vitals:    08/27/20 0840 08/27/20 1000 08/27/20 1125 08/27/20 1230   BP: 95/63 96/63 101/63 96/60   Pulse: 78 77  78   Resp: 17   17   Temp: 98.6 °F (37 °C)   98.9 °F (37.2 °C)   TempSrc: Oral   Oral   SpO2: 94%   95%   Weight:       Height:           Physical Exam  Vitals signs and nursing note reviewed. Constitutional:       Appearance: Normal appearance. He is well-developed. HENT:      Head: Normocephalic and atraumatic. Right Ear: External ear normal.      Left Ear: External ear normal.      Nose: Nose normal. No congestion or rhinorrhea. Mouth/Throat:      Mouth: Mucous membranes are moist.      Pharynx: No oropharyngeal exudate or posterior oropharyngeal erythema. Eyes:      General: No scleral icterus. Right eye: No discharge. Left eye: No discharge. Conjunctiva/sclera: Conjunctivae normal.      Pupils: Pupils are equal, round, and reactive to light. Neck:      Musculoskeletal: Normal range of motion and neck supple. No neck rigidity or muscular tenderness. Cardiovascular:      Rate and Rhythm: Normal rate and regular rhythm. Pulses: Normal pulses. Heart sounds: No murmur. No friction rub. Pulmonary:      Effort: Pulmonary effort is normal. No respiratory distress. Breath sounds: Normal breath sounds. No stridor. No wheezing, rhonchi or rales. Abdominal:      General: Bowel sounds are normal. There is distension. Palpations: Abdomen is soft. Tenderness: There is abdominal tenderness (Generalized, mild to moderate). There is no right CVA tenderness, left CVA tenderness, guarding or rebound. Musculoskeletal: Normal range of motion. General: No swelling or tenderness. Lymphadenopathy:      Cervical: No cervical adenopathy. Skin:     General: Skin is warm and dry. Coloration: Skin is not jaundiced. Findings: No erythema or rash. Neurological:      General: No focal deficit present. Mental Status: He is alert and oriented to person, place, and time. Mental status is at baseline. Motor: No abnormal muscle tone. Psychiatric:         Mood and Affect: Mood normal.         Behavior: Behavior normal.         Thought Content: Thought content normal.           Lines: All vascular access sites are healthy with no local erythema, discharge or tenderness. Intake and output:    I/O last 3 completed shifts: In: 26 [P.O.:240; IV Piggyback:40]  Out: -     Lab Data:   All available labs and old records have been reviewed by me. CBC:  Recent Labs     08/25/20  0516 08/26/20  0522 08/27/20  0542   WBC 15.2* 15.6* 16.5*   RBC 2.30* 2.18* 2.14*   HGB 8.8* 8.3* 8.0*   HCT 25.3* 24.3* 23.6*   PLT 81* 87* 85*   .3* 111.2* 110.4*   MCH 38.3* 38.2* 37.2*   MCHC 34.7 34.3 33.7   RDW 16.1* 15.9* 15.8*   BANDSPCT  --   --  1        BMP:  Recent Labs     08/25/20  0516 08/26/20  0522 08/27/20  0542   * 132* 133*   K 3.9 3.5 3.8   CL 99 98* 99   CO2 24 24 23   BUN 28* 33* 40*   CREATININE 1.5* 1.8* 2.6*   CALCIUM 7.7* 7.9* 8.3   GLUCOSE 90 108* 78        Hepatic Function Panel:   Lab Results   Component Value Date    ALKPHOS 50 08/27/2020    ALT 17 08/27/2020    AST 38 08/27/2020    PROT 5.8 08/27/2020    PROT 6.5 08/10/2010    BILITOT 17.7 08/27/2020    BILIDIR 10.0 08/26/2020    IBILI 6.8 08/26/2020    LABALBU 3.0 08/27/2020       CPK:   Lab Results   Component Value Date    CKTOTAL 144 08/10/2010     ESR: No results found for: SEDRATE  CRP: No results found for: CRP        Imaging: All pertinent images and reports for the current visit were reviewed by me during this visit. XR ACUTE ABD SERIES CHEST 1 VW   Final Result   Nonspecific pattern.   Proximal small bowel loops are mildly,   disproportionally dilated, although there is more distal gas. Generalized   ileus is favored over obstruction. Left pleural effusion. Consolidation in the left mid lung, versus inter   fissural pleural fluid. RECOMMENDATION:   CT abdomen and pelvis is a consideration. IR US GUIDED PARACENTESIS   Final Result   Successful ultrasound guided paracentesis. XR CHEST PORTABLE   Final Result   Stable appearance of left-sided pleuroparenchymal disease         IR US GUIDED PARACENTESIS   Final Result   Successful ultrasound guided paracentesis. US GALLBLADDER RUQ   Final Result   Heterogeneous nodular appearance of the liver compatible with known cirrhosis. Gallbladder and common duct appear grossly unremarkable in appearance. Moderate amount of ascites. XR CHEST PORTABLE   Final Result   Increasing pleuroparenchymal disease on the left. IR US GUIDED PARACENTESIS    (Results Pending)       Medications: All current and past medications were reviewed.      midodrine  10 mg Oral TID WC    ampicillin-sulbactam  1.5 g Intravenous Q6H    lactobacillus  1 capsule Oral BID WC    albumin human  12.5 g Intravenous Q6H    vancomycin  250 mg Oral 2 times per day    polyethylene glycol  17 g Oral BID    fluconazole  200 mg Intravenous Q24H    ipratropium-albuterol  1 ampule Inhalation Q4H WA    sodium chloride flush  10 mL Intravenous 2 times per day           magnesium sulfate, simethicone, potassium chloride **OR** potassium alternative oral replacement **OR** potassium chloride, calcium carbonate, HYDROmorphone, sodium chloride flush, acetaminophen **OR** acetaminophen, senna, ondansetron **OR** ondansetron, perflutren lipid microspheres      Problem list:       Patient Active Problem List   Diagnosis Code    Cirrhosis ([facility] [address] K74.60    Anasarca R60.1    HCAP (healthcare-associated pneumonia) J18.9    Severe sepsis ([facility] [address] A41.9, R65.20    Acute kidney injury ([facility] [address] N17.9    Anemia D64.9    Thrombocytopenia (HCC) D69.6    Coagulopathy (HCC) B61.4    Alcoholic cirrhosis (HCC) F25.18    Hyponatremia E87.1    Moderate malnutrition (HCC) E44.0    Pneumonia due to methicillin susceptible Staphylococcus aureus (MSSA) ([facility] [address] J15.211    SBP (spontaneous bacterial peritonitis) ([facility] [address] K65.2    Chronic hepatitis C without hepatic coma (HCC) B18.2    History of alcoholism ([facility] [address] F10.21    Ex-smoker Z87.891    Thrombocytopenia due to hypersplenism D69.59       Please note that this chart was generated using Dragon dictation software. Although every effort was made to ensure the accuracy of this automated transcription, some errors in transcription may have occurred inadvertently. If you may need any clarification, please do not hesitate to contact me through EPIC or at the phone number provided below with my electronic signature. Any pictures or media included in this note were obtained after taking informed verbal consent from the patient and with their approval to include those in the patient's medical record.     Manav Hargrove MD, MPH  8/27/2020 , 1:08 PM   [facility] Infectious Disease   Office: [phone]  Fax: [phone]  Tuesday AM clinic:   [address] [facility], [facility] [address]  Thursday AM [id number] [facility]

## 2020-08-27 NOTE — ACP (ADVANCE CARE PLANNING)
ADVANCED CARE PLANNING    Name:Jay Jay       :  1955              MRN:  [id number]      Purpose of Encounter: Advanced care planning in light of progression of hepatic and renal failure. Parties in attendance: :Leyla Mohamud MD, Family members: None. Decisional Capacity:Yes    Diagnosis: Principal Problem:    Severe sepsis ([facility] [address]  Active Problems:    Cirrhosis ([facility] [address]    Anasarca    HCAP (healthcare-associated pneumonia)    Acute kidney injury ([facility] [address]    Anemia    Thrombocytopenia (HCC)    Coagulopathy (HCC)    Alcoholic cirrhosis (HCC)    Hyponatremia    Moderate malnutrition (HCC)    Pneumonia due to methicillin susceptible Staphylococcus aureus (MSSA) (HCC)    SBP (spontaneous bacterial peritonitis) ([facility] [address]    Chronic hepatitis C without hepatic coma (HCC)    History of alcoholism ([facility] [address]    Ex-smoker    Thrombocytopenia due to hypersplenism  Resolved Problems:    * No resolved hospital problems. *    Patients Medical Story: 60-year-old male with history of alcoholic cirrhosis, ascites, presented with spontaneous bacterial peritonitis. Patient undergoing treatment with broad-spectrum antibiotics. His liver and renal functions continued to deteriorate. GI input appreciated, appears that there is no other options for treatment. Prognosis is very poor. Goals of Care Determinations: Patient wishes to focus on comfort care. At this time he would like to talk to hospice. Plan: Will notify No primary care provider on file. of change in care plan. Will look at further interventions as needed. Code Status: At this time patient wishes to be DNR-CC  Time Spent with Patient: 20 minutes      Electronically signed by Greg Parr MD on 2020 at 8:20 PM  Thank you No primary care provider on file. for the opportunity to be involved in this patient's care.

## 2020-08-27 NOTE — PROGRESS NOTES
[facility]    8/27/2020 2:29 PM        Name: Jose Grimaldo . Admitted: 8/17/2020  Primary Care Provider: No primary care provider on file. (Tel: None)                        Hospital course:   60 yo M with alcoholic cirrhosis, non compliance came to ER with complaints of anasarca.  Admitted as inpatient for HCAP, sepsis and KACEY.  COVID 19 negative on 8/18 from ED.   GI and Renal consulted.  Underwent 3.7 L removal of ascites by IR on 8/19    Subjective: Patient seen and examined. He still complains of abdominal pain and bloating. We have had lengthy discussion about his prognosis and hospice care. Patient is willing to meet with hospice at this time. No acute events overnight. Resting well. Pain control. Diet ok. Labs reviewed  Denies any chest pain sob.      Reviewed interval ancillary notes    Current Medications  midodrine (PROAMATINE) tablet 10 mg, TID WC  ampicillin-sulbactam (UNASYN) 1.5 g IVPB minibag, Q6H  lactobacillus (CULTURELLE) capsule 1 capsule, BID WC  albumin human 25 % IV solution 12.5 g, Q6H  vancomycin (FIRVANQ) 50 MG/ML oral solution 250 mg, 2 times per day  magnesium sulfate 1 g in dextrose 5% 100 mL IVPB, PRN  polyethylene glycol (GLYCOLAX) packet 17 g, BID  fluconazole (DIFLUCAN) in 0.9 % sodium chloride IVPB 200 mg, Q24H  simethicone (MYLICON) chewable tablet 80 mg, Q6H PRN  potassium chloride (KLOR-CON M) extended release tablet 40 mEq, PRN    Or  potassium bicarb-citric acid (EFFER-K) effervescent tablet 40 mEq, PRN    Or  potassium chloride 10 mEq/100 mL IVPB (Peripheral Line), PRN  calcium carbonate (TUMS) chewable tablet 500 mg, TID PRN  HYDROmorphone HCl PF (DILAUDID) injection 1 mg, Q4H PRN  ipratropium-albuterol (DUONEB) nebulizer solution 1 ampule, Q4H WA  sodium chloride flush 0.9 % injection 10 mL, 2 times per day  sodium chloride flush 0.9 % injection 10 mL, PRN  acetaminophen (TYLENOL) tablet 650 mg, Q6H PRN    Or  acetaminophen (TYLENOL) suppository 650 mg, Q6H PRN  senna (SENOKOT) tablet 8.6 mg, Daily PRN  ondansetron (ZOFRAN-ODT) disintegrating tablet 4 mg, Q8H PRN    Or  ondansetron (ZOFRAN) injection 4 mg, Q6H PRN  perflutren lipid microspheres (DEFINITY) injection 1.65 mg, ONCE PRN        Objective:  BP 96/60   Pulse 78   Temp 98.9 °F (37.2 °C) (Oral)   Resp 17   Ht 6' 2\" (1.88 m)   Wt 210 lb 8 oz (95.5 kg)   SpO2 95%   BMI 27.03 kg/m²   No intake or output data in the 24 hours ending 08/27/20 1429   Wt Readings from Last 3 Encounters:   08/26/20 210 lb 8 oz (95.5 kg)   08/13/20 229 lb 6.4 oz (104.1 kg)   08/06/20 228 lb (103.4 kg)       General appearance: -American gentleman appears comfortable  Eyes: Sclera clear. Pupils equal.  ENT: Moist oral mucosa. Trachea midline, no adenopathy, neck supple. Cardiovascular: Regular rhythm, normal S1, S2. No murmur. No edema in lower extremities  Respiratory: Not using accessory muscles. Good inspiratory effort. Clear to auscultation bilaterally, no wheeze or crackles. GI: Abdomen soft, some tenderness, distended due to ascites, normal bowel sounds  Musculoskeletal: No deformity, ROM WNL. Neurology: CN 2-12 grossly intact. No speech or motor deficits  Psych: Normal affect.  Alert and oriented in time, place and person  Skin: Warm, dry, normal turgor  Extremities nonpitting edema noted  Labs and Tests:  CBC:   Recent Labs     08/25/20  0516 08/26/20 0522 08/27/20  0542   WBC 15.2* 15.6* 16.5*   HGB 8.8* 8.3* 8.0*   HCT 25.3* 24.3* 23.6*   .3* 111.2* 110.4*   PLT 81* 87* 85*     BMP:    Recent Labs     08/25/20  0516 08/26/20  0522 08/27/20  0542   * 132* 133*   K 3.9 3.5 3.8   CL 99 98* 99   CO2 24 24 23   BUN 28* 33* 40*   CREATININE 1.5* 1.8* 2.6*   GLUCOSE 90 108* 78     Hepatic:   Recent Labs     08/25/20  0516 08/26/20  0522 08/27/20  0542 AST 41* 39* 38*   ALT 21 19 17   BILITOT 14.2* 16.8* 17.7*   ALKPHOS 59 56 50         Problem List  Principal Problem:    Severe sepsis (HCC)  Active Problems:    Cirrhosis ([facility] [address]    Anasarca    HCAP (healthcare-associated pneumonia)    Acute kidney injury ([facility] [address]    Anemia    Thrombocytopenia (HCC)    Coagulopathy (HCC)    Alcoholic cirrhosis (HCC)    Hyponatremia    Moderate malnutrition (HCC)    Pneumonia due to methicillin susceptible Staphylococcus aureus (MSSA) (HCC)    SBP (spontaneous bacterial peritonitis) (HCC)    Chronic hepatitis C without hepatic coma (HCC)    History of alcoholism (HCC)    Ex-smoker    Thrombocytopenia due to hypersplenism  Resolved Problems:    * No resolved hospital problems. *     Assessment & Plan:      1.  Alcoholic cirrhosis: Gastroenterology service wants to repeat  another paracentesis yesterday with 4 L removed,  Patient continues to have worsening liver function as evidenced by increasing INR, bilirubin, worsening hyponatremia  This time GI recommends monitoring for improvement with change of antibiotic. 2.  Ascites status post paracentesis on 8/19 3.7 L fluid removed, and again 4 L on 8/24 Lasix held in view of worsening renal function   3.  Patient is on empiric antibiotic treatment with cefepime vancomycin for possible H CAP, will check follow-up chest x-ray portable today and procalcitonin levels, however, white count continues to go up and patient is still with abdominal pain. We will consult infectious disease for recommendation on continued antibiotic therapy. 4.  Acute kidney injury on chronic kidney disease, creatinine 1.6--> 1.4--> 1.3 went up to 1.5 again today, nephrology consulted: Prerenal KACEY as well as hepatorenal syndrome as well as compartment syndrome from ascitic fluid are considered. Holding Lasix and albumin administration are the first therapies to be attempted        Prognosis for this patient is guarded.   High risk of poor outcome if hepatic failure continues to progress. Hospice consulted.     Diet: DIET LOW SODIUM 2 GM;  Dysphagia Soft and Bite-Sized; 1200 ml  Code:DNR-CCA  DVT PPX bhanu Ortega MD   8/27/2020 2:29 PM

## 2020-08-27 NOTE — PROGRESS NOTES
Will order a paracentesis for comfort.        Bridgett Lora MD  [address]

## 2020-08-27 NOTE — PROGRESS NOTES
MD Patricio Villanueva MD Arlean Hinders, MD               Office: [phone]                      Fax: [phone]             [facility]                   NEPHROLOGY INPATIENT PROGRESS NOTE:     PATIENT NAME: Shiraz Romero  : 1955  MRN: [id number]    IMPRESSION:       KACEY (on no CKD): crea worse today. 4.5L removed by paracentesis yesterday.    - BL Scr- 0.9  ---> 2.1 on admission. Improved to 1.1 but higher at  2.6 today. Crea continues to worsen rapidly concerning for hepatorenal physiology type 1. Reviewed GI notes, not a candidate for liver transplant. HD would also increase mortality in this patient population.    : .  Hold Lasix and SPNL. Continue albumin. Increase Midodrine. Now DNR-CCA. May be hospice appropriate. + severe leg edema + anasarca   : d/to lower albumin, liver disease  : unlikely NS  : US ABD:   Heterogeneous nodular appearance of the liver compatible with known cirrhosis. Gallbladder and common duct appear grossly unremarkable in appearance. Moderate amount of ascites.       : Na: Hyponatremia - d/to KACEY + cirrhosis - moderate - follow as stable. - Acid-Base: stable ,     - Anemia: follow Hgb      RECOMMENDATIONS:    - Albumin. Increase Midodrine. Hold Lasix and SPNL. - not a good candidate for HD.   - GI following  - at higher risk for worsening needing close monitoring   - overall poor prognosis d/to liver disease. Possible hospice candidate. Other problems: Management per primary and other consulting teams.    Hospital Problems           Last Modified POA    * (Principal) Severe sepsis ([address] 2020 Yes    Cirrhosis ([address] 2020 Yes    Anasarca 2020 Yes    HCAP (healthcare-associated pneumonia) 2020 Yes    KACEY (acute kidney injury) ([address] 2020 Yes    Anemia 2020 Yes    Thrombocytopenia ([address] 2020 Yes    Coagulopathy ([address]  Yes    Alcoholic cirrhosis ([address] Infusions:  PRN Meds:. REVIEW OF SYSTEMS:  As mentioned in chief complaint and HPI , Subjective       PHYSICAL EXAM:  Recent vital signs and recent I/Os reviewed by me. Wt Readings from Last 3 Encounters:   08/26/20 210 lb 8 oz (95.5 kg)   08/13/20 229 lb 6.4 oz (104.1 kg)   08/06/20 228 lb (103.4 kg)     BP Readings from Last 3 Encounters:   08/27/20 96/60   08/13/20 112/78   08/06/20 97/73     Patient Vitals for the past 24 hrs:   BP Temp Temp src Pulse Resp SpO2   08/27/20 1230 96/60 98.9 °F (37.2 °C) Oral 78 17 95 %   08/27/20 1125 101/63 -- -- -- -- --   08/27/20 1000 96/63 -- -- 77 -- --   08/27/20 0840 95/63 98.6 °F (37 °C) Oral 78 17 94 %   08/27/20 0411 (!) 100/55 98.9 °F (37.2 °C) Oral 82 17 93 %   08/27/20 0407 (!) 100/54 98.6 °F (37 °C) Oral 83 18 92 %   08/27/20 0004 (!) 104/56 -- -- 84 18 95 %   08/26/20 2059 -- -- -- -- 16 94 %   08/26/20 2028 104/64 98.3 °F (36.8 °C) Oral 78 18 95 %   08/26/20 1639 121/65 98.6 °F (37 °C) Oral 88 18 93 %     No intake or output data in the 24 hours ending 08/27/20 1256       Physical exam:  General: Awake, Alert,   HENT: Atraumatic, normocephalic   Eyes: Normal conjunctiva, Non-incteral sclera. Neck: Supple, JVD not visible. CVS:  Heart sounds are normal. No murmurs. No pericardial rub. RS: Normal respiratory efforts,  Lung fields are clear.    Abd: Soft , bowel sounds are normal, distended  Skin: No rash ,  bruises,   CNS: Awake Oriented , No focal deficits  Extremities/MSK: 3+ Edema      DATA:  Diagnostic tests reviewed by me for today's visit:    Recent Labs     08/25/20  0516 08/26/20  0522 08/27/20  0542   WBC 15.2* 15.6* 16.5*   HCT 25.3* 24.3* 23.6*   PLT 81* 87* 85*     Iron Saturation:  No components found for: PERCENTFE  FERRITIN:    Lab Results   Component Value Date    FERRITIN 864.6 08/18/2020     IRON:    Lab Results   Component Value Date    IRON 137 08/07/2018     TIBC:    Lab Results   Component Value Date    TIBC see below 08/07/2018 Recent Labs     08/25/20  0516 08/26/20  0522 08/27/20  0542   * 132* 133*   K 3.9 3.5 3.8   CL 99 98* 99   CO2 24 24 23   BUN 28* 33* 40*   CREATININE 1.5* 1.8* 2.6*     Recent Labs     08/25/20  0516 08/26/20  0522 08/27/20  0542   CALCIUM 7.7* 7.9* 8.3   MG  --  1.50*  --      No results for input(s): PH, PCO2, PO2 in the last 72 hours.     Invalid input(s): Dianna Embs    ABG:  No results found for: PH, PCO2, PO2, HCO3, BE, THGB, TCO2, O2SAT  VBG:  No results found for: PHVEN, KTM4YMF, BEVEN, H3RQVOEN    LDH:    Lab Results   Component Value Date     08/18/2020     Uric Acid:    Lab Results   Component Value Date    LABURIC 8.1 08/17/2020       PT/INR:    Lab Results   Component Value Date    PROTIME 35.0 08/27/2020    INR 2.98 08/27/2020     Warfarin PT/INR:  No components found for: PTPATWAR, PTINRWAR  PTT:    Lab Results   Component Value Date    APTT 76.4 08/27/2020   [APTT}  Last 3 Troponin:    Lab Results   Component Value Date    TROPONINI <0.01 08/17/2020    TROPONINI <0.01 04/18/2019    TROPONINI <0.01 08/06/2018       U/A:    Lab Results   Component Value Date    COLORU Dark yellow 08/26/2020    PROTEINU 30 08/26/2020    PHUR 5.5 08/26/2020    WBCUA 14 08/26/2020    RBCUA 27 08/26/2020    MUCUS 3+ 08/06/2018    TRICHOMONAS Present 08/17/2020    BACTERIA 3+ 08/06/2018    CLARITYU Clear 08/26/2020    SPECGRAV 1.020 08/26/2020    LEUKOCYTESUR Negative 08/26/2020    UROBILINOGEN 0.2 08/26/2020    BILIRUBINUR MODERATE 08/26/2020    BLOODU SMALL 08/26/2020    GLUCOSEU Negative 08/26/2020    AMORPHOUS 2+ 08/06/2018     Microalbumen/Creatinine ratio:  No components found for: RUCREAT  24 Hour Urine for Protein:  No components found for: RAWUPRO, UHRS3, CJIB65PH, UTV3  24 Hour Urine for Creatinine Clearance:  No components found for: CREAT4, UHRS10, UTV10  Urine Toxicology:  No components found for: IAMMENTA, IBARBIT, IBENZO, ICOCAINE, IMARTHC, IOPIATES, IPHENCYC    HgBA1c:  No results found for: LABA1C  RPR:  No results found for: RPR  HIV:  No results found for: HIV  AZAR:    Lab Results   Component Value Date    AZAR Negative 08/07/2018     RF:  No results found for: RF  DSDNA:  No components found for: DNA  AMYLASE:  No results found for: AMYLASE  LIPASE:    Lab Results   Component Value Date    LIPASE 12.0 08/17/2020     Fibrinogen Level:  No components found for: FIB       BELOW MENTIONED RADIOLOGY STUDY RESULTS BY ME:    Xr Chest Portable    Result Date: 8/17/2020  EXAMINATION: ONE XRAY VIEW OF THE CHEST 8/17/2020 6:23 pm COMPARISON: 04/22/2019 HISTORY: ORDERING SYSTEM PROVIDED HISTORY: general fatigue TECHNOLOGIST PROVIDED HISTORY: Reason for exam:->general fatigue Reason for Exam: vLeg Swelling (Pt c/o pain to bilateral lower extremities with gross amount of swelling. Denies fever. reports decreased appetite. Hx liver failure. reports not taking prescribed medications because they havent been filled by the pharmacy. Gross abdominal swelling noted as well) Acuity: Unknown Type of Exam: Unknown FINDINGS: Lung volumes are low accentuating heart size and bronchovascular markings at the lung bases. There is left-sided pleural effusion, likely loculated, with adjacent left lung consolidation, increased compared to prior. Volume loss is also seen in the left hemithorax Hazy right basilar opacity is seen. Increasing pleuroparenchymal disease on the left. Ir Us Guided Paracentesis    Result Date: 8/13/2020  PROCEDURE: ULTRASOUND GUIDED PARACENTESIS 8/13/2020 HISTORY: ORDERING SYSTEM PROVIDED HISTORY: Ascites due to alcoholic cirrhosis ([facility] TECHNIQUE: Informed consent was obtained after a detailed explanation of the procedure including risks, benefits, and alternatives. Universal protocol was followed. The right abdomen was prepped and draped in sterile fashion and local anesthesia was achieved with lidocaine.   A 5 [employer] Yueh needle sheath was advanced under ultrasound guidance

## 2020-08-27 NOTE — PROGRESS NOTES
Tolerated US guided paracentesis yielding 5400 ml fluid; states IV pain med effectively reduces generalized pain to a tolerable level for a short period of time, but pain returns, and pt desires more sustained pain reduction, relief - instructed pt on plan to recontact hospitalist RE same; no other significant changes since earlier assessment, except as noted; no other s/s of acute distress at present.

## 2020-08-28 ENCOUNTER — APPOINTMENT (OUTPATIENT)
Dept: GENERAL RADIOLOGY | Age: 65
DRG: 871 | End: 2020-08-28
Payer: MEDICARE

## 2020-08-28 LAB
A/G RATIO: 1 (ref 1.1–2.2)
ALBUMIN SERPL-MCNC: 2.9 G/DL (ref 3.4–5)
ALP BLD-CCNC: 46 U/L (ref 40–129)
ALT SERPL-CCNC: 18 U/L (ref 10–40)
ANION GAP SERPL CALCULATED.3IONS-SCNC: 12 MMOL/L (ref 3–16)
ANISOCYTOSIS: ABNORMAL
APTT: 79.9 SEC (ref 24.2–36.2)
AST SERPL-CCNC: 45 U/L (ref 15–37)
BANDED NEUTROPHILS RELATIVE PERCENT: 2 % (ref 0–7)
BASOPHILS ABSOLUTE: 0 K/UL (ref 0–0.2)
BASOPHILS RELATIVE PERCENT: 0 %
BILIRUB SERPL-MCNC: 18.6 MG/DL (ref 0–1)
BILIRUBIN DIRECT: >10 MG/DL (ref 0–0.3)
BILIRUBIN, INDIRECT: ABNORMAL MG/DL (ref 0–1)
BUN BLDV-MCNC: 46 MG/DL (ref 7–20)
CALCIUM SERPL-MCNC: 8.2 MG/DL (ref 8.3–10.6)
CHLORIDE BLD-SCNC: 101 MMOL/L (ref 99–110)
CO2: 22 MMOL/L (ref 21–32)
CREAT SERPL-MCNC: 3.6 MG/DL (ref 0.8–1.3)
D DIMER: 3085 NG/ML DDU (ref 0–229)
EOSINOPHILS ABSOLUTE: 0 K/UL (ref 0–0.6)
EOSINOPHILS RELATIVE PERCENT: 0 %
FIBRINOGEN: 100 MG/DL (ref 200–397)
GFR AFRICAN AMERICAN: 21
GFR NON-AFRICAN AMERICAN: 17
GLOBULIN: 2.9 G/DL
GLUCOSE BLD-MCNC: 83 MG/DL (ref 70–99)
HCT VFR BLD CALC: 24 % (ref 40.5–52.5)
HEMOGLOBIN: 8.2 G/DL (ref 13.5–17.5)
INR BLD: 2.97 (ref 0.86–1.14)
LYMPHOCYTES ABSOLUTE: 0.9 K/UL (ref 1–5.1)
LYMPHOCYTES RELATIVE PERCENT: 5 %
MACROCYTES: ABNORMAL
MCH RBC QN AUTO: 38.2 PG (ref 26–34)
MCHC RBC AUTO-ENTMCNC: 34.2 G/DL (ref 31–36)
MCV RBC AUTO: 112 FL (ref 80–100)
MONOCYTES ABSOLUTE: 1.7 K/UL (ref 0–1.3)
MONOCYTES RELATIVE PERCENT: 10 %
MYELOCYTE PERCENT: 1 %
NEUTROPHILS ABSOLUTE: 14.7 K/UL (ref 1.7–7.7)
NEUTROPHILS RELATIVE PERCENT: 82 %
PDW BLD-RTO: 15.8 % (ref 12.4–15.4)
PLATELET # BLD: 94 K/UL (ref 135–450)
PLATELET SLIDE REVIEW: ABNORMAL
PMV BLD AUTO: 8.7 FL (ref 5–10.5)
POTASSIUM REFLEX MAGNESIUM: 4 MMOL/L (ref 3.5–5.1)
PROTHROMBIN TIME: 34.8 SEC (ref 10–13.2)
RBC # BLD: 2.15 M/UL (ref 4.2–5.9)
SLIDE REVIEW: ABNORMAL
SODIUM BLD-SCNC: 135 MMOL/L (ref 136–145)
TOTAL PROTEIN: 5.8 G/DL (ref 6.4–8.2)
WBC # BLD: 17.3 K/UL (ref 4–11)

## 2020-08-28 PROCEDURE — 94761 N-INVAS EAR/PLS OXIMETRY MLT: CPT

## 2020-08-28 PROCEDURE — 94150 VITAL CAPACITY TEST: CPT

## 2020-08-28 PROCEDURE — 85610 PROTHROMBIN TIME: CPT

## 2020-08-28 PROCEDURE — 6360000002 HC RX W HCPCS: Performed by: INTERNAL MEDICINE

## 2020-08-28 PROCEDURE — 85025 COMPLETE CBC W/AUTO DIFF WBC: CPT

## 2020-08-28 PROCEDURE — 2580000003 HC RX 258: Performed by: HOSPITALIST

## 2020-08-28 PROCEDURE — P9047 ALBUMIN (HUMAN), 25%, 50ML: HCPCS | Performed by: INTERNAL MEDICINE

## 2020-08-28 PROCEDURE — 36415 COLL VENOUS BLD VENIPUNCTURE: CPT

## 2020-08-28 PROCEDURE — 6370000000 HC RX 637 (ALT 250 FOR IP): Performed by: PHYSICIAN ASSISTANT

## 2020-08-28 PROCEDURE — 6370000000 HC RX 637 (ALT 250 FOR IP): Performed by: INTERNAL MEDICINE

## 2020-08-28 PROCEDURE — 6360000002 HC RX W HCPCS: Performed by: HOSPITALIST

## 2020-08-28 PROCEDURE — 2060000000 HC ICU INTERMEDIATE R&B

## 2020-08-28 PROCEDURE — 85384 FIBRINOGEN ACTIVITY: CPT

## 2020-08-28 PROCEDURE — 85730 THROMBOPLASTIN TIME PARTIAL: CPT

## 2020-08-28 PROCEDURE — 88305 TISSUE EXAM BY PATHOLOGIST: CPT

## 2020-08-28 PROCEDURE — 99232 SBSQ HOSP IP/OBS MODERATE 35: CPT | Performed by: INTERNAL MEDICINE

## 2020-08-28 PROCEDURE — 88112 CYTOPATH CELL ENHANCE TECH: CPT

## 2020-08-28 PROCEDURE — 80053 COMPREHEN METABOLIC PANEL: CPT

## 2020-08-28 PROCEDURE — 2580000003 HC RX 258: Performed by: INTERNAL MEDICINE

## 2020-08-28 PROCEDURE — 94640 AIRWAY INHALATION TREATMENT: CPT

## 2020-08-28 PROCEDURE — 85379 FIBRIN DEGRADATION QUANT: CPT

## 2020-08-28 PROCEDURE — 51798 US URINE CAPACITY MEASURE: CPT

## 2020-08-28 PROCEDURE — 74022 RADEX COMPL AQT ABD SERIES: CPT

## 2020-08-28 RX ORDER — FLUCONAZOLE 2 MG/ML
200 INJECTION, SOLUTION INTRAVENOUS DAILY
Status: DISCONTINUED | OUTPATIENT
Start: 2020-08-28 | End: 2020-08-29 | Stop reason: HOSPADM

## 2020-08-28 RX ORDER — FLUCONAZOLE 2 MG/ML
200 INJECTION, SOLUTION INTRAVENOUS
Status: DISCONTINUED | OUTPATIENT
Start: 2020-08-29 | End: 2020-08-28

## 2020-08-28 RX ADMIN — FLUCONAZOLE 200 MG: 200 INJECTION, SOLUTION INTRAVENOUS at 17:18

## 2020-08-28 RX ADMIN — ONDANSETRON 4 MG: 2 INJECTION INTRAMUSCULAR; INTRAVENOUS at 16:11

## 2020-08-28 RX ADMIN — HYDROMORPHONE HYDROCHLORIDE 1 MG: 1 INJECTION, SOLUTION INTRAMUSCULAR; INTRAVENOUS; SUBCUTANEOUS at 08:23

## 2020-08-28 RX ADMIN — IPRATROPIUM BROMIDE AND ALBUTEROL SULFATE 1 AMPULE: .5; 3 SOLUTION RESPIRATORY (INHALATION) at 16:35

## 2020-08-28 RX ADMIN — POLYETHYLENE GLYCOL 3350 17 G: 17 POWDER, FOR SOLUTION ORAL at 08:23

## 2020-08-28 RX ADMIN — OXYCODONE 10 MG: 5 TABLET ORAL at 22:11

## 2020-08-28 RX ADMIN — MIDODRINE HYDROCHLORIDE 10 MG: 5 TABLET ORAL at 12:21

## 2020-08-28 RX ADMIN — OXYCODONE 10 MG: 5 TABLET ORAL at 10:09

## 2020-08-28 RX ADMIN — VANCOMYCIN HYDROCHLORIDE 250 MG: KIT at 08:26

## 2020-08-28 RX ADMIN — Medication 10 ML: at 12:23

## 2020-08-28 RX ADMIN — HYDROMORPHONE HYDROCHLORIDE 1 MG: 1 INJECTION, SOLUTION INTRAMUSCULAR; INTRAVENOUS; SUBCUTANEOUS at 16:11

## 2020-08-28 RX ADMIN — POLYETHYLENE GLYCOL 3350 17 G: 17 POWDER, FOR SOLUTION ORAL at 22:11

## 2020-08-28 RX ADMIN — Medication 10 ML: at 18:38

## 2020-08-28 RX ADMIN — OXYCODONE 10 MG: 5 TABLET ORAL at 14:40

## 2020-08-28 RX ADMIN — AMPICILLIN SODIUM AND SULBACTAM SODIUM 1.5 G: 1; .5 INJECTION, POWDER, FOR SOLUTION INTRAMUSCULAR; INTRAVENOUS at 14:40

## 2020-08-28 RX ADMIN — ALBUMIN (HUMAN) 12.5 G: 0.25 INJECTION, SOLUTION INTRAVENOUS at 05:55

## 2020-08-28 RX ADMIN — Medication 10 ML: at 16:10

## 2020-08-28 RX ADMIN — ALBUMIN (HUMAN) 12.5 G: 0.25 INJECTION, SOLUTION INTRAVENOUS at 01:29

## 2020-08-28 RX ADMIN — Medication 10 ML: at 20:15

## 2020-08-28 RX ADMIN — MIDODRINE HYDROCHLORIDE 10 MG: 5 TABLET ORAL at 08:23

## 2020-08-28 RX ADMIN — IPRATROPIUM BROMIDE AND ALBUTEROL SULFATE 1 AMPULE: .5; 3 SOLUTION RESPIRATORY (INHALATION) at 08:19

## 2020-08-28 RX ADMIN — HYDROMORPHONE HYDROCHLORIDE 1 MG: 1 INJECTION, SOLUTION INTRAMUSCULAR; INTRAVENOUS; SUBCUTANEOUS at 01:29

## 2020-08-28 RX ADMIN — Medication 1 CAPSULE: at 16:11

## 2020-08-28 RX ADMIN — IPRATROPIUM BROMIDE AND ALBUTEROL SULFATE 1 AMPULE: .5; 3 SOLUTION RESPIRATORY (INHALATION) at 22:04

## 2020-08-28 RX ADMIN — Medication 10 ML: at 08:24

## 2020-08-28 RX ADMIN — HYDROMORPHONE HYDROCHLORIDE 1 MG: 1 INJECTION, SOLUTION INTRAMUSCULAR; INTRAVENOUS; SUBCUTANEOUS at 12:21

## 2020-08-28 RX ADMIN — Medication 1 CAPSULE: at 08:23

## 2020-08-28 RX ADMIN — MIDODRINE HYDROCHLORIDE 10 MG: 5 TABLET ORAL at 16:11

## 2020-08-28 RX ADMIN — HYDROMORPHONE HYDROCHLORIDE 1 MG: 1 INJECTION, SOLUTION INTRAMUSCULAR; INTRAVENOUS; SUBCUTANEOUS at 20:10

## 2020-08-28 RX ADMIN — OXYCODONE 10 MG: 5 TABLET ORAL at 18:38

## 2020-08-28 RX ADMIN — VANCOMYCIN HYDROCHLORIDE 250 MG: KIT at 22:11

## 2020-08-28 RX ADMIN — AMPICILLIN SODIUM AND SULBACTAM SODIUM 1.5 G: 1; .5 INJECTION, POWDER, FOR SOLUTION INTRAMUSCULAR; INTRAVENOUS at 02:56

## 2020-08-28 RX ADMIN — ALBUMIN (HUMAN) 12.5 G: 0.25 INJECTION, SOLUTION INTRAVENOUS at 18:37

## 2020-08-28 RX ADMIN — ALBUMIN (HUMAN) 12.5 G: 0.25 INJECTION, SOLUTION INTRAVENOUS at 14:39

## 2020-08-28 RX ADMIN — ONDANSETRON 4 MG: 2 INJECTION INTRAMUSCULAR; INTRAVENOUS at 08:23

## 2020-08-28 ASSESSMENT — PAIN SCALES - GENERAL
PAINLEVEL_OUTOF10: 10
PAINLEVEL_OUTOF10: 7
PAINLEVEL_OUTOF10: 10

## 2020-08-28 ASSESSMENT — PAIN DESCRIPTION - LOCATION
LOCATION: GENERALIZED

## 2020-08-28 ASSESSMENT — PAIN DESCRIPTION - DESCRIPTORS
DESCRIPTORS: CONSTANT
DESCRIPTORS: CONSTANT
DESCRIPTORS: ACHING;DISCOMFORT
DESCRIPTORS: CONSTANT

## 2020-08-28 ASSESSMENT — PAIN DESCRIPTION - PAIN TYPE
TYPE: CHRONIC PAIN

## 2020-08-28 ASSESSMENT — ENCOUNTER SYMPTOMS
EYE DISCHARGE: 0
EYE REDNESS: 0
ABDOMINAL PAIN: 1
RHINORRHEA: 0
ABDOMINAL DISTENTION: 1
NAUSEA: 0
BACK PAIN: 0
SORE THROAT: 0
CONSTIPATION: 0
SHORTNESS OF BREATH: 0
COUGH: 0
TROUBLE SWALLOWING: 0
WHEEZING: 0
DIARRHEA: 0

## 2020-08-28 ASSESSMENT — PAIN DESCRIPTION - ONSET
ONSET: ON-GOING

## 2020-08-28 ASSESSMENT — PAIN DESCRIPTION - FREQUENCY
FREQUENCY: CONTINUOUS

## 2020-08-28 ASSESSMENT — PAIN DESCRIPTION - PROGRESSION
CLINICAL_PROGRESSION: NOT CHANGED

## 2020-08-28 ASSESSMENT — PAIN - FUNCTIONAL ASSESSMENT: PAIN_FUNCTIONAL_ASSESSMENT: PREVENTS OR INTERFERES WITH ALL ACTIVE AND SOME PASSIVE ACTIVITIES

## 2020-08-28 NOTE — PROGRESS NOTES
Continuous chronic pain persists; BP stable; seen by [facility]) - pt affirms willingness for inpt Hospice admission, though states desire to return home when adequate pain management has been achieved; no significant changes since earlier assessment, except as noted; no other s/s of acute distress at present.

## 2020-08-28 NOTE — CONSULTS
Palliative Care:     58 yo M with alcoholic cirrhosis, non compliance came to ER with complaints of anasarca on 8/18/20. Admitted as inpatient for HCAP, sepsis and KACEY.  COVID 19 negative on 8/18 from [facility] and Renal consulted. GI 8/28/20 report: Worsening hepatic function since sunday with leukocytosis due to SBP. Afeb. S/p para with 3.5 L fluid removed 8/19. Neutrophil count was 228 which is on the higher side but not over 250 which is needed to diagnose neutrocytic ascites. Cx neg. He was on Cefepime prior to para so it is possible he could have had SBP which was partially treated. SAAG elevated at 2.7 c/w portal HTN. Repeat para 8/24 with 4.5 L fluid removed. Neutrophil count has increased to 417 c/w neutrocytic ascites. Ascitic fluid cx negative. Antibiotics changed to meropenum 8/25 then Zosyn and fluconazole 8/25. Nephrology 8/28/20 report:  Reports not a good candidate for HD. Patient at higher risk for worsening needing close monitoring. Agrees overall poor prognosis d/to liver disease. Recent Labs     08/26/20  0522 08/27/20  0542 08/28/20  0529   WBC 15.6* 16.5* 17.3*   HGB 8.3* 8.0* 8.2*   HCT 24.3* 23.6* 24.0*   .2* 110.4* 112.0*   PLT 87* 85* 94*           Recent Labs     08/26/20  0522 08/27/20  0542 08/28/20  0529   * 133* 135*   K 3.5 3.8 4.0   CL 98* 99 101   CO2 24 23 22   BUN 33* 40* 46*   CREATININE 1.8* 2.6* 3.6*           Recent Labs     08/26/20  0522 08/27/20  0542 08/28/20  0529   AST 39* 38* 45*   ALT 19 17 18   BILIDIR 10.0*  --  >10.0*   BILITOT 16.8* 17.7* 18.6*   ALKPHOS 56 50 46           Recent Labs     08/26/20  0522 08/27/20  0542 08/28/20  0529   PROTIME 30.9* 35.0* 34.8*   INR 2.64* 2.98* 2.97*        AXR 8/27/20:         Impression    Nonspecific pattern.  Proximal small bowel loops are mildly,    disproportionally dilated, although there is more distal gas.  Generalized    ileus is favored over obstruction.         Left pleural effusion.  Consolidation in the left mid lung, versus inter    fissural pleural fluid. Patient underwent 3.7 L removal of ascites by IR on 8/19 and another procedure on 8/27/20 removing 5.4L. Past Medical History:   has a past medical history of Cirrhosis of liver ([address] · Methicillin susceptible Staphylococcus aureus pneumonia  · Sepsis on admission  · Spontaneous bacterial peritonitis  · Decompensated hepatic cirrhosis  · Acute on chronic kidney disease  · Negative HIV screen in August 2018  · Chronic hepatitis C  · History of alcoholism  · Ex-smoker      Past Surgical History:   has no past surgical history on file. Advance Directives:     DNR-CC. Living Will/DPOA completed 8/28/20. Ilia Tejeda (sister) listed. Phone number: [phone]. Problem Severity: Pain/Other Symptoms:      Patient admits pain consistently at level (10) with miniimal relief from PRN medications PO Roxicodone and IV HYDROmorphone. Notable hypotension complicating administration with BP as low as 90/50's at times. Most recent paracentesis removed 5.4L of fluid with minimal relief. Bed Mobility/Toileting/Transfer:      Prior to admit patient was able to drive and care for self. Admits to being a fall risk related to increased overall weakness at home setting. Today presents self unable to get OOB without one assist. Jay Pabon is helpful. Performance Status:      PPS 40%. Patient remains A/OX3 and able to express all needs. Symptom Assessment: Appetite/Nausea/Bowels/Fatigue:  Patient admits to poor appetite due to ascities and pain level. Mild nausea consistentily present. No BM since  8/22/20 and unable to pass flatulence. Bowel sounds hypoactive. Admits to severe fatigue with any mobility attempts OOB. Patient is with  Oropharyngeal Dysphagia. Current dietary intake: Dysphagia III Soft and Bite Sized with thin liquids, meds with puree     Social History:   reports that he has been smoking.  He has been smoking about 0.25 packs per day. He has never used smokeless tobacco. He reports previous alcohol use. He reports that he does not use drugs. Family History:  family history includes High Blood Pressure in his mother. Patient states he has (9) brother and (4) sisters. Mother recently passed away. He does have (2) sons that live in [place] and minimal contact. Ex-wife lives locally. Psychological/Spiritual:      Patient has understanding of current disease process. Clinical review completed. Admits to being [facility].      Family Discussion:      Palliative Care meeting completed with patient and on phone sister Carolyn Henao). Review of current clinical findings, current medical status and disposition plan options. Patient agrees with DNR-CC. Wishes are to be creamation upon death. Agrees with body donation. Advance Directives and Living Will completed. Hard copy placed on chart. Patient provided a soft copy and a copy mailed out to sister Alyse Rosario as she lives in [place]. Hospice philosophy discussed and options. Patient agrees for Hospice consult. Does live alone. Unable to return to home setting due to no family in home/lives alone. Agrees to short term in-patient with hopes of stabilizing pain control. Patient and family discussed hospice organizations. Agrees for [facility]. Consult faxed and call to [facility] completed. Nurse Rolan Reyes advised of todays meeting. Perfect Serve send to attending Dr. Sadiq Leggett of today's discussion and plan of care.

## 2020-08-28 NOTE — PROGRESS NOTES
Physical Therapy Attempt    Attempted to see pt for PT tx however pt off floor at radiology at this time. Noted pt scheduled for hospice consult. Will hold PT for now.    Melissa Johnston PT [id number]

## 2020-08-28 NOTE — CARE COORDINATION
Referral initiated to [facility]. SW spoke with James Hay at [facility], who will assess pt this date.     Sunday Pastor MSW, [address]

## 2020-08-28 NOTE — PROGRESS NOTES
SBP <100, but stable, especially as R/Y increased dose of Midodrine; instructed pt on plan to maintain meds as ordered as long as VS remain stable, pt remains free of side effects; awaiting hospice

## 2020-08-28 NOTE — PROGRESS NOTES
MD Mony Julien MD Ardie Casino, MD               Office: [phone]                      Fax: [phone]             [facility]                   NEPHROLOGY INPATIENT PROGRESS NOTE:     PATIENT NAME: Rohini Miguel  : 1955  MRN: [id number]    IMPRESSION:       KACEY (on no CKD): crea worse today. 5.4L removed by paracentesis yesterday.    - BL Scr- 0.9  ---> 2.1 on admission. Improved to 1.1 but higher at  3.6 today. Crea continues to worsen rapidly concerning for hepatorenal physiology type 1. Reviewed GI notes, not a candidate for liver transplant. HD would also increase mortality in this patient population.    : .  Hold Lasix and SPNL. Continue albumin. Increased Midodrine. Now DNR-CCA. May be hospice appropriate. + severe leg edema + anasarca   : d/to lower albumin, liver disease  : unlikely NS  : US ABD:   Heterogeneous nodular appearance of the liver compatible with known cirrhosis. Gallbladder and common duct appear grossly unremarkable in appearance. Moderate amount of ascites. Reaccumulates rapidly.       : Na: Hyponatremia - d/to KACEY + cirrhosis - moderate - follow as stable. - Acid-Base: stable ,     - Anemia: follow Hgb      RECOMMENDATIONS:    - Albumin. Increased Midodrine. Hold Lasix and SPNL. - not a good candidate for HD.   - GI following  - at higher risk for worsening needing close monitoring   - overall poor prognosis d/to liver disease. Possible hospice candidate. Other problems: Management per primary and other consulting teams.    Hospital Problems           Last Modified POA    * (Principal) Severe sepsis ([address] 2020 Yes    Cirrhosis ([address] 2020 Yes    Anasarca 2020 Yes    HCAP (healthcare-associated pneumonia) 2020 Yes    Acute kidney injury ([address] 2020 Yes    Anemia 2020 Yes    Thrombocytopenia ([address] 2020 Yes    Coagulopathy ([address] 2020 Yes Alcoholic cirrhosis ([email or web address] 1/48/4371 Yes    Hyponatremia 8/18/2020 Yes    Moderate malnutrition (HCC) (Chronic) 8/25/2020 Yes    Pneumonia due to methicillin susceptible Staphylococcus aureus (MSSA) ([email or web address] 8/25/2020 Yes    SBP (spontaneous bacterial peritonitis) ([email or web address] 8/25/2020 Yes    Chronic hepatitis C without hepatic coma ([email or web address] 8/25/2020 Yes    History of alcoholism ([email or web address] 8/25/2020 Yes    Ex-smoker 8/25/2020 Yes    Thrombocytopenia due to hypersplenism 8/27/2020 Yes        High Complexity. Multiple complex problems. Placido Burdick MD       [facility] [address] [place] [address] [place]: [phone] or [employer]  Fax: [phone]        ========================================================  Subjective:   Seen resting in bed today  Denies SOB  +Edema and abdominal distention    Past medical, Surgical, Social, Family medical history reviewed by me. MEDICATIONS: reviewed by me. Medications Prior to Admission:  No current facility-administered medications on file prior to encounter.       Current Outpatient Medications on File Prior to Encounter   Medication Sig Dispense Refill    furosemide (LASIX) 80 MG tablet Take 1 tablet by mouth daily 30 tablet 3    spironolactone (ALDACTONE) 100 MG tablet Take 1 tablet by mouth daily 30 tablet 3    potassium chloride (KLOR-CON M) 20 MEQ extended release tablet Take 1 tablet by mouth daily 30 tablet 5       Medications Prior to Admission: furosemide (LASIX) 80 MG tablet, Take 1 tablet by mouth daily  spironolactone (ALDACTONE) 100 MG tablet, Take 1 tablet by mouth daily  potassium chloride (KLOR-CON M) 20 MEQ extended release tablet, Take 1 tablet by mouth daily     Scheduled Meds:   ampicillin-sulbactam  1.5 g Intravenous Q12H    fluconazole  200 mg Intravenous Daily    midodrine  10 mg Oral TID WC    lactobacillus  1 capsule Oral BID WC    albumin human  12.5 g Intravenous Q6H    vancomycin  250 mg Oral 2 times per day    polyethylene glycol  17 g Oral BID    ipratropium-albuterol  1 ampule Inhalation Q4H WA    sodium chloride flush  10 mL Intravenous 2 times per day     Continuous Infusions:  PRN Meds:. REVIEW OF SYSTEMS:  As mentioned in chief complaint and HPI , Subjective       PHYSICAL EXAM:  Recent vital signs and recent I/Os reviewed by me. Wt Readings from Last 3 Encounters:   08/26/20 210 lb 8 oz (95.5 kg)   08/13/20 229 lb 6.4 oz (104.1 kg)   08/06/20 228 lb (103.4 kg)     BP Readings from Last 3 Encounters:   08/28/20 93/60   08/13/20 112/78   08/06/20 97/73     Patient Vitals for the past 24 hrs:   BP Temp Temp src Pulse Resp SpO2   08/28/20 1000 93/60 -- -- -- -- --   08/28/20 0820 -- -- -- -- 18 94 %   08/28/20 0805 105/66 98.4 °F (36.9 °C) Oral 79 17 95 %   08/28/20 0647 (!) 94/59 -- -- -- -- --   08/28/20 0545 95/60 98.1 °F (36.7 °C) Oral 81 17 95 %   08/28/20 0126 104/68 98.3 °F (36.8 °C) Oral 79 18 93 %   08/27/20 2131 -- -- -- -- 18 95 %   08/27/20 2013 102/66 98.8 °F (37.1 °C) Oral 76 17 94 %   08/27/20 1654 -- -- -- -- 18 95 %   08/27/20 1630 102/67 98.2 °F (36.8 °C) Oral 77 17 95 %   08/27/20 1230 96/60 98.9 °F (37.2 °C) Oral 78 17 95 %   08/27/20 1125 101/63 -- -- -- -- --       Intake/Output Summary (Last 24 hours) at 8/28/2020 1120  Last data filed at 8/28/2020 0820  Gross per 24 hour   Intake 711.14 ml   Output 200 ml   Net 511.14 ml          Physical exam:  General: Awake, Alert,   HENT: Atraumatic, normocephalic   Eyes: Normal conjunctiva, Non-incteral sclera. Neck: Supple, JVD not visible. CVS:  Heart sounds are normal. No murmurs. No pericardial rub. RS: Normal respiratory efforts,  Lung fields are clear.    Abd: Soft , bowel sounds are normal, distended  Skin: No rash ,  bruises,   CNS: Awake Oriented , No focal deficits  Extremities/MSK: 3+ Edema      DATA:  Diagnostic tests reviewed by me for today's visit:    Recent Labs     08/26/20  0522 08/27/20  0542 08/28/20  0529   WBC 15.6* 16.5* 17.3*   HCT 24.3* 23.6* 24.0*   PLT 87* 85* 94*     Iron Saturation:  No components found for: PERCENTFE  FERRITIN:    Lab Results   Component Value Date    FERRITIN 864.6 08/18/2020     IRON:    Lab Results   Component Value Date    IRON 137 08/07/2018     TIBC:    Lab Results   Component Value Date    TIBC see below 08/07/2018       Recent Labs     08/26/20  0522 08/27/20  0542 08/28/20  0529   * 133* 135*   K 3.5 3.8 4.0   CL 98* 99 101   CO2 24 23 22   BUN 33* 40* 46*   CREATININE 1.8* 2.6* 3.6*     Recent Labs     08/26/20  0522 08/27/20  0542 08/28/20  0529   CALCIUM 7.9* 8.3 8.2*   MG 1.50*  --   --      No results for input(s): PH, PCO2, PO2 in the last 72 hours.     Invalid input(s): Dianna Embs    ABG:  No results found for: PH, PCO2, PO2, HCO3, BE, THGB, TCO2, O2SAT  VBG:  No results found for: PHVEN, HKC9OGZ, BEVEN, L7YPQEBM    LDH:    Lab Results   Component Value Date     08/18/2020     Uric Acid:    Lab Results   Component Value Date    LABURIC 8.1 08/17/2020       PT/INR:    Lab Results   Component Value Date    PROTIME 34.8 08/28/2020    INR 2.97 08/28/2020     Warfarin PT/INR:  No components found for: PTPATWAR, PTINRWAR  PTT:    Lab Results   Component Value Date    APTT 79.9 08/28/2020   [APTT}  Last 3 Troponin:    Lab Results   Component Value Date    TROPONINI <0.01 08/17/2020    TROPONINI <0.01 04/18/2019    TROPONINI <0.01 08/06/2018       U/A:    Lab Results   Component Value Date    COLORU Dark yellow 08/26/2020    PROTEINU 30 08/26/2020    PHUR 5.5 08/26/2020    WBCUA 14 08/26/2020    RBCUA 27 08/26/2020    MUCUS 3+ 08/06/2018    TRICHOMONAS Present 08/17/2020    BACTERIA 3+ 08/06/2018    CLARITYU Clear 08/26/2020    SPECGRAV 1.020 08/26/2020    LEUKOCYTESUR Negative 08/26/2020    UROBILINOGEN 0.2 08/26/2020    BILIRUBINUR MODERATE 08/26/2020    BLOODU SMALL 08/26/2020    GLUCOSEU Negative 08/26/2020    AMORPHOUS 2+ 08/06/2018     Microalbumen/Creatinine ratio:  No components found for: RUCREAT  24 Hour Urine for Protein:  No components found for: RAWUPRO, UHRS3, IVPO11TD, UTV3  24 Hour Urine for Creatinine Clearance:  No components found for: CREAT4, UHRS10, UTV10  Urine Toxicology:  No components found for: IAMMENTA, IBARBIT, IBENZO, ICOCAINE, IMARTHC, IOPIATES, IPHENCYC    HgBA1c:  No results found for: LABA1C  RPR:  No results found for: RPR  HIV:  No results found for: HIV  AZAR:    Lab Results   Component Value Date    AZAR Negative 08/07/2018     RF:  No results found for: RF  DSDNA:  No components found for: DNA  AMYLASE:  No results found for: AMYLASE  LIPASE:    Lab Results   Component Value Date    LIPASE 12.0 08/17/2020     Fibrinogen Level:  No components found for: FIB       BELOW MENTIONED RADIOLOGY STUDY RESULTS BY ME:    Xr Chest Portable    Result Date: 8/17/2020  EXAMINATION: ONE XRAY VIEW OF THE CHEST 8/17/2020 6:23 pm COMPARISON: 04/22/2019 HISTORY: ORDERING SYSTEM PROVIDED HISTORY: general fatigue TECHNOLOGIST PROVIDED HISTORY: Reason for exam:->general fatigue Reason for Exam: vLeg Swelling (Pt c/o pain to bilateral lower extremities with gross amount of swelling. Denies fever. reports decreased appetite. Hx liver failure. reports not taking prescribed medications because they havent been filled by the pharmacy. Gross abdominal swelling noted as well) Acuity: Unknown Type of Exam: Unknown FINDINGS: Lung volumes are low accentuating heart size and bronchovascular markings at the lung bases. There is left-sided pleural effusion, likely loculated, with adjacent left lung consolidation, increased compared to prior. Volume loss is also seen in the left hemithorax Hazy right basilar opacity is seen. Increasing pleuroparenchymal disease on the left.      Ir Us Guided Paracentesis    Result Date: 8/13/2020  PROCEDURE: ULTRASOUND GUIDED PARACENTESIS 8/13/2020 HISTORY: ORDERING SYSTEM PROVIDED HISTORY: Ascites due to alcoholic cirrhosis ([facility] TECHNIQUE: Informed consent was obtained

## 2020-08-28 NOTE — PROGRESS NOTES
[facility] PROGRESS NOTE    8/28/2020 2:42 PM        Name: Charles Walton . Admitted: 8/17/2020  Primary Care Provider: No primary care provider on file. (Tel: None)                        Hospital course:   58 yo M with alcoholic cirrhosis, non compliance came to ER with complaints of anasarca.  Admitted as inpatient for HCAP, sepsis and KACEY.  COVID 19 negative on 8/18 from ED.   GI and Renal consulted.  Underwent 3.7 L removal of ascites by IR on 8/19    Subjective: Patient seen and examined. He still complains of abdominal pain and bloating. We have had lengthy discussion about his prognosis and hospice care. Patient is willing to meet with hospice at this time. No acute events overnight. Resting well. Pain control. Diet ok. Labs reviewed  Denies any chest pain sob.      Reviewed interval ancillary notes    Current Medications  ampicillin-sulbactam (UNASYN) 1.5 g IVPB minibag, Q12H  fluconazole (DIFLUCAN) in 0.9 % sodium chloride IVPB 200 mg, Daily  midodrine (PROAMATINE) tablet 10 mg, TID WC  lactobacillus (CULTURELLE) capsule 1 capsule, BID WC  albumin human 25 % IV solution 12.5 g, Q6H  vancomycin (FIRVANQ) 50 MG/ML oral solution 250 mg, 2 times per day  oxyCODONE (ROXICODONE) immediate release tablet 5 mg, Q4H PRN    Or  oxyCODONE (ROXICODONE) immediate release tablet 10 mg, Q4H PRN  magnesium sulfate 1 g in dextrose 5% 100 mL IVPB, PRN  polyethylene glycol (GLYCOLAX) packet 17 g, BID  simethicone (MYLICON) chewable tablet 80 mg, Q6H PRN  potassium chloride (KLOR-CON M) extended release tablet 40 mEq, PRN    Or  potassium bicarb-citric acid (EFFER-K) effervescent tablet 40 mEq, PRN    Or  potassium chloride 10 mEq/100 mL IVPB (Peripheral Line), PRN  calcium carbonate (TUMS) chewable tablet 500 mg, TID PRN  HYDROmorphone HCl PF (DILAUDID) injection 1 mg, Q4H PRN  ipratropium-albuterol (DUONEB) nebulizer solution 1 ampule, Q4H WA  sodium chloride flush 0.9 % injection 10 mL, 2 times per day  sodium chloride flush 0.9 % injection 10 mL, PRN  acetaminophen (TYLENOL) tablet 650 mg, Q6H PRN    Or  acetaminophen (TYLENOL) suppository 650 mg, Q6H PRN  senna (SENOKOT) tablet 8.6 mg, Daily PRN  ondansetron (ZOFRAN-ODT) disintegrating tablet 4 mg, Q8H PRN    Or  ondansetron (ZOFRAN) injection 4 mg, Q6H PRN  perflutren lipid microspheres (DEFINITY) injection 1.65 mg, ONCE PRN        Objective:  BP 96/60   Pulse 78   Temp 98.4 °F (36.9 °C) (Oral)   Resp 17   Ht 6' 2\" (1.88 m)   Wt 210 lb 8 oz (95.5 kg)   SpO2 95%   BMI 27.03 kg/m²     Intake/Output Summary (Last 24 hours) at 8/28/2020 1442  Last data filed at 8/28/2020 0820  Gross per 24 hour   Intake 711.14 ml   Output 200 ml   Net 511.14 ml      Wt Readings from Last 3 Encounters:   08/26/20 210 lb 8 oz (95.5 kg)   08/13/20 229 lb 6.4 oz (104.1 kg)   08/06/20 228 lb (103.4 kg)       General appearance: -American gentleman appears comfortable  Eyes: Sclera clear. Pupils equal.  ENT: Moist oral mucosa. Trachea midline, no adenopathy, neck supple. Cardiovascular: Regular rhythm, normal S1, S2. No murmur. No edema in lower extremities  Respiratory: Not using accessory muscles. Good inspiratory effort. Clear to auscultation bilaterally, no wheeze or crackles. GI: Abdomen soft, some tenderness, distended due to ascites, normal bowel sounds  Musculoskeletal: No deformity, ROM WNL. Neurology: CN 2-12 grossly intact. No speech or motor deficits  Psych: Normal affect.  Alert and oriented in time, place and person  Skin: Warm, dry, normal turgor  Extremities nonpitting edema noted  Labs and Tests:  CBC:   Recent Labs     08/26/20  0522 08/27/20  0542 08/28/20  0529   WBC 15.6* 16.5* 17.3*   HGB 8.3* 8.0* 8.2*   HCT 24.3* 23.6* 24.0*   .2* 110.4* 112.0*   PLT 87* 85* 94*     BMP:    Recent Labs     08/26/20  0522 08/27/20  0542 08/28/20  0529   * 133* 135*   K 3.5 3.8 4.0   CL 98* 99 101   CO2 24 23 22   BUN 33* 40* 46*   CREATININE 1.8* 2.6* 3.6*   GLUCOSE 108* 78 83     Hepatic:   Recent Labs     08/26/20  0522 08/27/20  0542 08/28/20  0529   AST 39* 38* 45*   ALT 19 17 18   BILITOT 16.8* 17.7* 18.6*   ALKPHOS 56 50 46         Problem List  Principal Problem:    Severe sepsis (HCC)  Active Problems:    Cirrhosis (HCC)    Anasarca    HCAP (healthcare-associated pneumonia)    KACEY (acute kidney injury) ([facility]    Anemia    Thrombocytopenia (HCC)    Coagulopathy (HCC)    Alcoholic cirrhosis (HCC)    Hyponatremia    Moderate malnutrition (HCC)    Pneumonia due to methicillin susceptible Staphylococcus aureus (MSSA) (HCC)    SBP (spontaneous bacterial peritonitis) (HCC)    Chronic hepatitis C without hepatic coma (HCC)    History of alcoholism (HCC)    Ex-smoker    Thrombocytopenia due to hypersplenism  Resolved Problems:    * No resolved hospital problems. *     Assessment & Plan:      1.  Alcoholic cirrhosis: Gastroenterology service wants to repeat  another paracentesis yesterday with 4 L removed,  Patient continues to have worsening liver function as evidenced by increasing INR, bilirubin, worsening hyponatremia  This time GI recommends monitoring for improvement with change of antibiotic. 2.  Ascites status post paracentesis on 8/19 3.7 L fluid removed, and again 4 L on 8/24 Lasix held in view of worsening renal function   3.  Patient is on empiric antibiotic treatment with cefepime vancomycin for possible H CAP, will check follow-up chest x-ray portable today and procalcitonin levels, however, white count continues to go up and patient is still with abdominal pain. We will consult infectious disease for recommendation on continued antibiotic therapy.   4.  Acute kidney injury on chronic kidney disease, creatinine 1.6--> 1.4--> 1.3 went up to 1.5 again today, nephrology consulted: Prerenal KACEY as well as hepatorenal syndrome as well as compartment syndrome from ascitic fluid are considered. Holding Lasix and albumin administration are the first therapies to be attempted        Prognosis for this patient is guarded. High risk of poor outcome if hepatic failure continues to progress. Hospice consulted.     Diet: DIET LOW SODIUM 2 GM;  Dysphagia Soft and Bite-Sized; 1200 ml  Code:DNR-CC  DVT PPX lovenox       Selena Dupree MD   8/28/2020 2:42 PM

## 2020-08-28 NOTE — PROGRESS NOTES
Incentive Spirometry education and demonstration completed by Respiratory Therapy Yes      Response to education: Patient Refused     Teaching Time: 5 minutes    Minimum Predicted Vital Capacity - 684 mL. Patient's Actual Vital Capacity - Refused mL. Turning over to Nursing for routine follow-up Yes.     Comments:     Electronically signed by Juany Henry RCP on 8/28/2020 at 6:03 AM

## 2020-08-28 NOTE — PROGRESS NOTES
[facility] GI  Gastroenterology Progress Note    Leobardo Riedel is a 59 y.o. male patient. 1. Severe sepsis ([facility]    2. Pneumonia due to organism    3. Acute kidney injury ([facility]        SUBJECTIVE:   stable abdominal pain. No N/V. Last BM was 3 days ago. ROS:  Cardiovascular ROS: no chest pain or dyspnea on exertion  Gastrointestinal ROS: see hpi  Respiratory ROS: no cough, shortness of breath, or wheezing    Physical    VITALS:  BP 93/60   Pulse 79   Temp 98.4 °F (36.9 °C) (Oral)   Resp 18   Ht 6' 2\" (1.88 m)   Wt 210 lb 8 oz (95.5 kg)   SpO2 94%   BMI 27.03 kg/m²   TEMPERATURE:  Current - Temp: 98.4 °F (36.9 °C); Max - Temp  Av.5 °F (36.9 °C)  Min: 98.1 °F (36.7 °C)  Max: 98.9 °F (37.2 °C)    NAD  RRR, Nl s1s2  Lungs CTA Bilaterally, normal effort  Abdomen soft, distended, mild diffuse tenderness, no HSM, Bowel sounds present  AAOx3, No asterixis   edema BLE      Data    Data Review:    Recent Labs     20  0520  0542 20  05   WBC 15.6* 16.5* 17.3*   HGB 8.3* 8.0* 8.2*   HCT 24.3* 23.6* 24.0*   .2* 110.4* 112.0*   PLT 87* 85* 94*     Recent Labs     20  0542 20  0529   * 133* 135*   K 3.5 3.8 4.0   CL 98* 99 101   CO2 24 23 22   BUN 33* 40* 46*   CREATININE 1.8* 2.6* 3.6*     Recent Labs     20  0542 20  0529   AST 39* 38* 45*   ALT 19 17 18   BILIDIR 10.0*  --  >10.0*   BILITOT 16.8* 17.7* 18.6*   ALKPHOS 56 50 46     No results for input(s): LIPASE, AMYLASE in the last 72 hours. Recent Labs     20  0522 20  0542 20  0529   PROTIME 30.9* 35.0* 34.8*   INR 2.64* 2.98* 2.97*     No results for input(s): PTT in the last 72 hours.      AXR 20       Impression    Nonspecific pattern.  Proximal small bowel loops are mildly,    disproportionally dilated, although there is more distal gas.  Generalized    ileus is favored over obstruction.         Left pleural effusion.  Consolidation in the left mid lung, versus inter    fissural pleural fluid. ASSESSMENT / PLAN      Cirrhosis - due to alcohol abuse. No GI bleeding. No prior EGD. Worsening hepatic function since sunday with leukocytosis due to SBP. Afeb. S/p para with 3.5 L fluid removed 8/19. Neutrophil count was 228 which is on the higher side but not over 250 which is needed to diagnose neutrocytic ascites. Cx neg. He was on Cefepime prior to para so it is possible he could have had SBP which was partially treated. SAAG elevated at 2.7 c/w portal HTN. Repeat para 8/24 with 4.5 L fluid removed. Neutrophil count has increased to 417 c/w neutrocytic ascites. Ascitic fluid cx negative. Antibiotics changed to meropenum 8/25 then Zosyn and fluconazole 8/25. AXR c/w ileus. Worsening liver and renal function. WBC remains elevated. He is not candidate for liver transplant with continued alcohol use and active infection. - f/u repeat blood cx - NGTD  - repeat axr today  - Discussed worsening liver and renal function with pt and poor prognosis. Await hospice eval.      H/o HCV - RNA level detectable at low level in 2018. KACEY       Discussed with Dr. Fatou Cleveland, [facility]    I have personally performed a face to face diagnostic evaluation on this patient. I have interviewed and examined the patient and I agree with the findings and recommended plan of care. In summary, my findings and plan are the following: no new complaints. Repeat KUB c/w ileus. No N/V. Little po intake now.  Hospice to meet with pt      Renuka Vieira MD  [address]

## 2020-08-28 NOTE — PLAN OF CARE
Nutrition Problem #1: Moderate malnutrition  Intervention: Food and/or Nutrient Delivery: Continue Current Diet  Nutritional Goals: po intake greater than 50% of meals
Problem: Falls - Risk of:  Goal: Will remain free from falls  Description: Will remain free from falls  Outcome: Ongoing     Problem: Pain Control  Goal: Maintain pain level at or below patient's acceptable level (or 5 if patient is unable to determine acceptable level)  Outcome: Ongoing     Problem: Cardiovascular  Goal: Hemodynamic stability  Outcome: Ongoing     Problem: Respiratory  Goal: O2 Sat > 90%  Outcome: Ongoing     Vitals:    08/20/20 0434   BP: 107/60   Pulse: 65   Resp: 14   Temp: 98.4 °F (36.9 °C)   SpO2: 93%     Pt awake/asleep on/off overnight and c/o continued generalized pain 10/10. VSS at 0434 with oxygenation saturations wnl on RA. Pt is a medium fall risk. Pt remains free from falls, throughout night. Door open. Pt encouraged to use call light for needs throughout night; call light is within reach. Bed lock is in lowest position. See all flowsheets. Will continue to monitor throughout night.
Problem: Falls - Risk of:  Goal: Will remain free from falls  Description: Will remain free from falls  Outcome: Ongoing  Note: Pt remains free from falls. Safety precautions in place. Bed in lowest position, bed wheels locked, call light with in reach, bed alarm on, yellow blanket in place, fall risk wrist band on, SAFE outside of doorway. Will continue to monitor.    Goal: Absence of physical injury  Description: Absence of physical injury  Outcome: Ongoing
Problem: Falls - Risk of:  Goal: Will remain free from falls  Outcome: Ongoing  Note: Pt remains free from falls this shift.  Pt alert, oriented and able to call when appropriate
Problem: Pain:  Goal: Control of chronic pain  Description: Control of chronic pain  Outcome: Ongoing  Note: C/o ongoing 10/10 generalized pain - instructed on plan to admin IV pain med     Problem: Falls - Risk of:  Goal: Will remain free from falls  Description: Will remain free from falls  Outcome: Ongoing  Note: Call light in reach; bed in low position; SR up x2; pt affirms awareness and understanding of need to call for and await assist with OOB mobility, and consistently demonstrates same. Problem: Skin Integrity:  Goal: Will show no infection signs and symptoms  Description: Will show no infection signs and symptoms  Outcome: Ongoing  Note: afebrile     Problem: Nutrition  Goal: Optimal nutrition therapy  Outcome: Ongoing  Note: Appetite varies with pain, and with ascites     Problem: Cardiovascular  Goal: Hemodynamic stability  Outcome: Ongoing  Note: VS WNL - BP improved since last reading     Problem: Respiratory  Goal: O2 Sat > 90%  Outcome: Ongoing  Note: WNL on RA  Goal: No pulmonary complications  Outcome: Ongoing  Note: BBS CTA, though only miniamal air movement at left base and RUL     Problem: KNOWLEDGE DEFICIT  Goal: Patient/S.O. demonstrates understanding of disease process, treatment plan, medications, and discharge instructions.   Outcome: Ongoing  Note: Instructed on current plan of care     Problem: GI  Goal: No bowel complications  Outcome: Ongoing  Note: Last BM 3 days ago - bowel sounds hypoactive at this time     Problem:   Goal: No urinary complication  Outcome: Ongoing  Note: Decreased UOP recently     Problem: Neurological  Intervention: Neurological Status Assessment  Note: No neuro deficits evident
Problem: Pain:  Goal: Pain level will decrease  Description: Pain level will decrease  8/23/2020 1019 by Shaw Jaime RN  Outcome: Ongoing     Problem: Falls - Risk of:  Goal: Will remain free from falls  Description: Will remain free from falls  8/23/2020 1019 by Shaw Jaime RN  Outcome: Ongoing     Problem: Skin Integrity:  Intervention: Assess presence of edema.   Note: Will continue to monitor     Problem: Respiratory  Goal: O2 Sat > 90%  8/23/2020 1019 by Shaw Jaime RN  Outcome: Ongoing
Problem: Pain:  Goal: Pain level will decrease  Description: Pain level will decrease  Outcome: Ongoing     Problem: Falls - Risk of:  Goal: Will remain free from falls  Description: Will remain free from falls  Outcome: Ongoing     Pt is c/o of pain. MD aware. Pt had no falls so far this shift. Fall precautions in place.  [facility]
Problem: Pain:  Goal: Pain level will decrease  Description: Pain level will decrease  Outcome: Ongoing  Note: PRN pain meds as scheduled. Goal: Control of acute pain  Description: Control of acute pain  Outcome: Ongoing  Goal: Control of chronic pain  Description: Control of chronic pain  Outcome: Ongoing     Problem: Falls - Risk of:  Goal: Will remain free from falls  Description: Will remain free from falls  Outcome: Ongoing  Goal: Absence of physical injury  Description: Absence of physical injury  Outcome: Ongoing     Problem: Skin Integrity:  Goal: Will show no infection signs and symptoms  Description: Will show no infection signs and symptoms  Outcome: Ongoing  Note: No signs on infection. Goal: Absence of new skin breakdown  Description: Absence of new skin breakdown  Outcome: Ongoing  Note: No signs on breakdown.      Problem: Pain Control  Goal: Maintain pain level at or below patient's acceptable level (or 5 if patient is unable to determine acceptable level)  Outcome: Ongoing     Problem: Cardiovascular  Goal: Hemodynamic stability  Outcome: Ongoing     Problem: Respiratory  Goal: O2 Sat > 90%  Outcome: Ongoing
125 ml amy cloudy urine with foul odor - specimen collected     Problem: Neurological  Intervention: Neurological Status Assessment  Note: No neuro deficits evident

## 2020-08-28 NOTE — PROGRESS NOTES
Infectious Diseases   Progress Note      Admission Date: 8/17/2020  Hospital Day: Hospital Day: 12   Attending: Antoni Mir MD  Date of service: 8/28/2020     Chief complaint/ Reason for consult:     · Methicillin susceptible Staphylococcus aureus pneumonia  · Sepsis on admission  · Spontaneous bacterial peritonitis  · Decompensated hepatic cirrhosis    Microbiology:        I have reviewed allavailable micro lab data and cultures    · Blood culture (2/2) - collected on 8/17/2020: Negative  · Sputum culture - collectedon 8/20/2020: MSSA  · Acetic fluid culture  - collected on 8/19/20  and 8/24/2020: Negative so far    Results for Estella Forbes (MRN [id number]) as of 8/25/2020 14:25   Ref. Range 8/19/2020 14:00 8/24/2020 09:45   Source + CELL CT/DIFF-BF Unknown Ascitic Fluid Ascitic Fluid   Appearance, Fluid Unknown Hazy Cloudy   Color, Fluid Unknown Yellow Yellow   Fluid Type Unknown Ascitic Fluid    RBC, Fluid Latest Units: /cumm 2,200 5,200   Lymphocytes, Body Fluid Latest Units: % 2 3   Neutrophil Count, Fluid Latest Units: % 47 85   Nucl Cell, Fluid Latest Units: /cumm 485 491   Protein, Fluid Latest Ref Range: Not Established g/dL 1.7    Albumin, Fluid Latest Ref Range: Not Established g/dL 0.6    Clot Eval. Unknown see below see below   Number of Cells Counted Fluid Unknown 100 100     Antibiotics and immunizations:       Current antibiotics: All antibiotics and their doses were reviewed by me    Recent Abx Admin                   vancomycin GLENDY PIERRE [facility]) 50 MG/ML oral solution 250 mg (mg) 250 mg Given 08/28/20 0826     250 mg Given 08/27/20 2018    ampicillin-sulbactam (UNASYN) 1.5 g IVPB minibag (g) 1.5 g New Bag 08/28/20 0256     1.5 g New Bag 08/27/20 2018    fluconazole (DIFLUCAN) in 0.9 % sodium chloride IVPB 200 mg (mg) 200 mg New Bag 08/27/20 1644                  Immunization History: All immunization history was reviewed by me today.       There is no immunization history on file for this patient. Known drug allergies: All allergies were reviewed and updated    No Known Allergies    Social history:     Social History:  All social andepidemiologic history was reviewed and updated by me today as needed. · Tobacco use:   reports that he has been smoking. He has been smoking about 0.25 packs per day. He has never used smokeless tobacco.  · Alcohol use:   reports previous alcohol use. · Currently lives in: [address] [place] [address]  ·  reports no history of drug use. Assessment:     The patient is a 59 y.o. old male who  has a past medical history of Cirrhosis of liver ([address] 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 He remains on IV Zosyn. White cell count is 17,300. Serum creatinine is 3.6    Liver enzymes are okay. Serum bilirubin is 18.6 today. The patient underwent another paracentesis yesterday.   Ascitic fluid shows 666 white cells with 73% neutrophils        Plan:     Diagnostic Workup:    · Given persistently elevated PMNs in the ascitic fluid and history of hepatitis C, will ask for cytology on ascitic fluid to rule out any million cells  · Continue to follow  fever curve, WBC count and blood cultures  · Follow up on liver and renal function    Antimicrobials:    · The patient is on IV Unasyn and IV fluconazole  · Had also started on low-dose oral vancomycin Negative for ear discharge, ear pain, rhinorrhea, sore throat and trouble swallowing. Eyes: Negative for discharge and redness. Respiratory: Negative for cough, shortness of breath and wheezing. Cardiovascular: Negative for chest pain and leg swelling. Gastrointestinal: Positive for abdominal distention and abdominal pain (Mild to moderate, generalized). Negative for constipation, diarrhea and nausea. Endocrine: Negative for polyuria. Genitourinary: Negative for dysuria, flank pain, frequency, hematuria and urgency. Musculoskeletal: Negative for back pain and myalgias. Skin: Negative for rash. Neurological: Negative for dizziness, seizures and headaches. Hematological: Does not bruise/bleed easily. Psychiatric/Behavioral: Negative for hallucinations and suicidal ideas. All other systems reviewed and are negative. Past Medical History: All past medical history reviewed today. Past Medical History:   Diagnosis Date    Cirrhosis of liver ([facility] [address]     aug 2018       Past Surgical History: All past surgical history was reviewed today. History reviewed. No pertinent surgical history. Family History: All family history was reviewed today. Problem Relation Age of Onset    High Blood Pressure Mother        Objective:       PHYSICAL EXAM:      Vitals:   Vitals:    08/28/20 0805 08/28/20 0820 08/28/20 1000 08/28/20 1205   BP: 105/66  93/60 96/60   Pulse: 79   78   Resp: 17 18  17   Temp: 98.4 °F (36.9 °C)   98.4 °F (36.9 °C)   TempSrc: Oral   Oral   SpO2: 95% 94%  95%   Weight:       Height:           Physical Exam  Vitals signs and nursing note reviewed. Constitutional:       Appearance: Normal appearance. He is well-developed. HENT:      Head: Normocephalic and atraumatic. Right Ear: External ear normal.      Left Ear: External ear normal.      Nose: Nose normal. No congestion or rhinorrhea.       Mouth/Throat:      Mouth: Mucous membranes are moist.      Pharynx: No oropharyngeal exudate or posterior oropharyngeal erythema. Eyes:      General: No scleral icterus. Right eye: No discharge. Left eye: No discharge. Conjunctiva/sclera: Conjunctivae normal.      Pupils: Pupils are equal, round, and reactive to light. Neck:      Musculoskeletal: Normal range of motion and neck supple. No neck rigidity or muscular tenderness. Cardiovascular:      Rate and Rhythm: Normal rate and regular rhythm. Pulses: Normal pulses. Heart sounds: No murmur. No friction rub. Pulmonary:      Effort: Pulmonary effort is normal. No respiratory distress. Breath sounds: Normal breath sounds. No stridor. No wheezing, rhonchi or rales. Abdominal:      General: Bowel sounds are normal. There is distension. Palpations: Abdomen is soft. Tenderness: There is abdominal tenderness (Generalized, mild to moderate). There is no right CVA tenderness, left CVA tenderness, guarding or rebound. Musculoskeletal: Normal range of motion. General: No swelling or tenderness. Lymphadenopathy:      Cervical: No cervical adenopathy. Skin:     General: Skin is warm and dry. Coloration: Skin is not jaundiced. Findings: No erythema or rash. Neurological:      General: No focal deficit present. Mental Status: He is alert and oriented to person, place, and time. Mental status is at baseline. Motor: No abnormal muscle tone. Psychiatric:         Mood and Affect: Mood normal.         Behavior: Behavior normal.         Thought Content: Thought content normal.           Lines: All vascular access sites are healthy with no local erythema, discharge or tenderness. Intake and output:    I/O last 3 completed shifts: In: 471.1 [I.V.:471.1]  Out: 200 [Urine:200]    Lab Data:   All available labs and old records have been reviewed by me.     CBC:  Recent Labs     08/26/20  0522 08/27/20  0542 08/28/20  0529   WBC 15.6* 16.5* 17.3*   RBC 2.18* 2.14* 2.15*   HGB 8.3* 8.0* 8.2*   HCT 24.3* 23.6* 24.0*   PLT 87* 85* 94*   .2* 110.4* 112.0*   MCH 38.2* 37.2* 38.2*   MCHC 34.3 33.7 34.2   RDW 15.9* 15.8* 15.8*   BANDSPCT  --  1 2        BMP:  Recent Labs     08/26/20  0522 08/27/20  0542 08/28/20  0529   * 133* 135*   K 3.5 3.8 4.0   CL 98* 99 101   CO2 24 23 22   BUN 33* 40* 46*   CREATININE 1.8* 2.6* 3.6*   CALCIUM 7.9* 8.3 8.2*   GLUCOSE 108* 78 83        Hepatic Function Panel:   Lab Results   Component Value Date    ALKPHOS 46 08/28/2020    ALT 18 08/28/2020    AST 45 08/28/2020    PROT 5.8 08/28/2020    PROT 6.5 08/10/2010    BILITOT 18.6 08/28/2020    BILIDIR >10.0 08/28/2020    IBILI see below 08/28/2020    LABALBU 2.9 08/28/2020       CPK:   Lab Results   Component Value Date    CKTOTAL 144 08/10/2010     ESR: No results found for: SEDRATE  CRP: No results found for: CRP        Imaging: All pertinent images and reports for the current visit were reviewed by me during this visit. IR US GUIDED PARACENTESIS   Final Result   Successful ultrasound guided paracentesis. XR ACUTE ABD SERIES CHEST 1 VW   Final Result   Nonspecific pattern. Proximal small bowel loops are mildly,   disproportionally dilated, although there is more distal gas. Generalized   ileus is favored over obstruction. Left pleural effusion. Consolidation in the left mid lung, versus inter   fissural pleural fluid. RECOMMENDATION:   CT abdomen and pelvis is a consideration. IR US GUIDED PARACENTESIS   Final Result   Successful ultrasound guided paracentesis. XR CHEST PORTABLE   Final Result   Stable appearance of left-sided pleuroparenchymal disease         IR US GUIDED PARACENTESIS   Final Result   Successful ultrasound guided paracentesis. US GALLBLADDER RUQ   Final Result   Heterogeneous nodular appearance of the liver compatible with known cirrhosis. Gallbladder and common duct appear grossly unremarkable in appearance. need any clarification, please do not hesitate to contact me through [facility] or at the phone number provided below with my electronic signature. Any pictures or media included in this note were obtained after taking informed verbal consent from the patient and with their approval to include those in the patient's medical record.     Sandy Leslie MD, MPH  8/28/2020 , 1:27 PM   [facility] Infectious Disease   Office: [phone]  Fax: [phone]  Tuesday AM clinic:   [address] [place] [address], [facility] [address]  Thursday AM LYSJAP:[id number] [facility]

## 2020-08-28 NOTE — PROGRESS NOTES
Incentive Spirometry education and demonstration completed by Respiratory Therapy Yes      Response to education: Poor     Teaching Time: 5 minutes    Minimum Predicted Vital Capacity - 587 mL. Patient's Actual Vital Capacity - 0 mL. Turning over to Nursing for routine follow-up Yes. Comments: Patient refused to make any effort to complete Incentive Spirometry. He stated he will not do it.       Electronically signed by Sae Acosta RCP on 8/27/2020 at 9:36 PM

## 2020-08-29 VITALS
HEART RATE: 83 BPM | BODY MASS INDEX: 27.01 KG/M2 | HEIGHT: 74 IN | WEIGHT: 210.5 LBS | TEMPERATURE: 98.2 F | RESPIRATION RATE: 18 BRPM | DIASTOLIC BLOOD PRESSURE: 59 MMHG | SYSTOLIC BLOOD PRESSURE: 104 MMHG | OXYGEN SATURATION: 92 %

## 2020-08-29 LAB
A/G RATIO: 1 (ref 1.1–2.2)
ALBUMIN SERPL-MCNC: 3 G/DL (ref 3.4–5)
ALP BLD-CCNC: 46 U/L (ref 40–129)
ALT SERPL-CCNC: 18 U/L (ref 10–40)
ANION GAP SERPL CALCULATED.3IONS-SCNC: 13 MMOL/L (ref 3–16)
ANISOCYTOSIS: ABNORMAL
APTT: 81.7 SEC (ref 24.2–36.2)
AST SERPL-CCNC: 48 U/L (ref 15–37)
BANDED NEUTROPHILS RELATIVE PERCENT: 1 % (ref 0–7)
BASOPHILS ABSOLUTE: 0.2 K/UL (ref 0–0.2)
BASOPHILS RELATIVE PERCENT: 1 %
BILIRUB SERPL-MCNC: 18.9 MG/DL (ref 0–1)
BILIRUBIN DIRECT: >10 MG/DL (ref 0–0.3)
BILIRUBIN, INDIRECT: ABNORMAL MG/DL (ref 0–1)
BUN BLDV-MCNC: 52 MG/DL (ref 7–20)
CALCIUM SERPL-MCNC: 8.2 MG/DL (ref 8.3–10.6)
CHLORIDE BLD-SCNC: 99 MMOL/L (ref 99–110)
CO2: 20 MMOL/L (ref 21–32)
CREAT SERPL-MCNC: 4.1 MG/DL (ref 0.8–1.3)
D DIMER: 3279 NG/ML DDU (ref 0–229)
EOSINOPHILS ABSOLUTE: 0.5 K/UL (ref 0–0.6)
EOSINOPHILS RELATIVE PERCENT: 3 %
FIBRINOGEN: 100 MG/DL (ref 200–397)
GFR AFRICAN AMERICAN: 18
GFR NON-AFRICAN AMERICAN: 15
GLOBULIN: 2.9 G/DL
GLUCOSE BLD-MCNC: 89 MG/DL (ref 70–99)
HCT VFR BLD CALC: 22.5 % (ref 40.5–52.5)
HEMATOLOGY PATH CONSULT: NO
HEMOGLOBIN: 7.6 G/DL (ref 13.5–17.5)
HYPOCHROMIA: ABNORMAL
INR BLD: 3.14 (ref 0.86–1.14)
LYMPHOCYTES ABSOLUTE: 0.8 K/UL (ref 1–5.1)
LYMPHOCYTES RELATIVE PERCENT: 5 %
MACROCYTES: ABNORMAL
MCH RBC QN AUTO: 37.1 PG (ref 26–34)
MCHC RBC AUTO-ENTMCNC: 33.8 G/DL (ref 31–36)
MCV RBC AUTO: 110 FL (ref 80–100)
MONOCYTES ABSOLUTE: 2.1 K/UL (ref 0–1.3)
MONOCYTES RELATIVE PERCENT: 14 %
MRSA CULTURE ONLY: NORMAL
NEUTROPHILS ABSOLUTE: 11.7 K/UL (ref 1.7–7.7)
NEUTROPHILS RELATIVE PERCENT: 76 %
OVALOCYTES: ABNORMAL
PDW BLD-RTO: 15.9 % (ref 12.4–15.4)
PLATELET # BLD: 86 K/UL (ref 135–450)
PLATELET SLIDE REVIEW: ABNORMAL
PMV BLD AUTO: 9.1 FL (ref 5–10.5)
POLYCHROMASIA: ABNORMAL
POTASSIUM REFLEX MAGNESIUM: 4.1 MMOL/L (ref 3.5–5.1)
PROTHROMBIN TIME: 36.8 SEC (ref 10–13.2)
RBC # BLD: 2.05 M/UL (ref 4.2–5.9)
SLIDE REVIEW: ABNORMAL
SODIUM BLD-SCNC: 132 MMOL/L (ref 136–145)
TARGET CELLS: ABNORMAL
TEAR DROP CELLS: ABNORMAL
TOTAL PROTEIN: 5.9 G/DL (ref 6.4–8.2)
WBC # BLD: 15.2 K/UL (ref 4–11)

## 2020-08-29 PROCEDURE — 6370000000 HC RX 637 (ALT 250 FOR IP): Performed by: INTERNAL MEDICINE

## 2020-08-29 PROCEDURE — 85384 FIBRINOGEN ACTIVITY: CPT

## 2020-08-29 PROCEDURE — 51798 US URINE CAPACITY MEASURE: CPT

## 2020-08-29 PROCEDURE — 2580000003 HC RX 258: Performed by: INTERNAL MEDICINE

## 2020-08-29 PROCEDURE — 85025 COMPLETE CBC W/AUTO DIFF WBC: CPT

## 2020-08-29 PROCEDURE — 94760 N-INVAS EAR/PLS OXIMETRY 1: CPT

## 2020-08-29 PROCEDURE — 6360000002 HC RX W HCPCS: Performed by: INTERNAL MEDICINE

## 2020-08-29 PROCEDURE — 85379 FIBRIN DEGRADATION QUANT: CPT

## 2020-08-29 PROCEDURE — 85730 THROMBOPLASTIN TIME PARTIAL: CPT

## 2020-08-29 PROCEDURE — 94640 AIRWAY INHALATION TREATMENT: CPT

## 2020-08-29 PROCEDURE — P9047 ALBUMIN (HUMAN), 25%, 50ML: HCPCS | Performed by: INTERNAL MEDICINE

## 2020-08-29 PROCEDURE — 2580000003 HC RX 258: Performed by: HOSPITALIST

## 2020-08-29 PROCEDURE — 6370000000 HC RX 637 (ALT 250 FOR IP): Performed by: PHYSICIAN ASSISTANT

## 2020-08-29 PROCEDURE — 80053 COMPREHEN METABOLIC PANEL: CPT

## 2020-08-29 PROCEDURE — 94761 N-INVAS EAR/PLS OXIMETRY MLT: CPT

## 2020-08-29 PROCEDURE — 36415 COLL VENOUS BLD VENIPUNCTURE: CPT

## 2020-08-29 PROCEDURE — 85610 PROTHROMBIN TIME: CPT

## 2020-08-29 RX ORDER — ALBUMIN (HUMAN) 12.5 G/50ML
25 SOLUTION INTRAVENOUS EVERY 6 HOURS
Status: DISCONTINUED | OUTPATIENT
Start: 2020-08-29 | End: 2020-08-29 | Stop reason: HOSPADM

## 2020-08-29 RX ORDER — HYDROMORPHONE HYDROCHLORIDE 1 MG/ML
1 INJECTION, SOLUTION INTRAMUSCULAR; INTRAVENOUS; SUBCUTANEOUS
Status: DISCONTINUED | OUTPATIENT
Start: 2020-08-29 | End: 2020-08-29 | Stop reason: HOSPADM

## 2020-08-29 RX ADMIN — FLUCONAZOLE 200 MG: 200 INJECTION, SOLUTION INTRAVENOUS at 08:30

## 2020-08-29 RX ADMIN — OXYCODONE 10 MG: 5 TABLET ORAL at 10:15

## 2020-08-29 RX ADMIN — Medication 1 CAPSULE: at 08:30

## 2020-08-29 RX ADMIN — HYDROMORPHONE HYDROCHLORIDE 1 MG: 1 INJECTION, SOLUTION INTRAMUSCULAR; INTRAVENOUS; SUBCUTANEOUS at 08:30

## 2020-08-29 RX ADMIN — ALBUMIN (HUMAN) 12.5 G: 0.25 INJECTION, SOLUTION INTRAVENOUS at 07:04

## 2020-08-29 RX ADMIN — POLYETHYLENE GLYCOL 3350 17 G: 17 POWDER, FOR SOLUTION ORAL at 08:30

## 2020-08-29 RX ADMIN — ALBUMIN (HUMAN) 12.5 G: 0.25 INJECTION, SOLUTION INTRAVENOUS at 00:31

## 2020-08-29 RX ADMIN — Medication 10 ML: at 08:30

## 2020-08-29 RX ADMIN — OXYCODONE 10 MG: 5 TABLET ORAL at 15:13

## 2020-08-29 RX ADMIN — MIDODRINE HYDROCHLORIDE 10 MG: 5 TABLET ORAL at 12:24

## 2020-08-29 RX ADMIN — IPRATROPIUM BROMIDE AND ALBUTEROL SULFATE 1 AMPULE: .5; 3 SOLUTION RESPIRATORY (INHALATION) at 09:43

## 2020-08-29 RX ADMIN — HYDROMORPHONE HYDROCHLORIDE 1 MG: 1 INJECTION, SOLUTION INTRAMUSCULAR; INTRAVENOUS; SUBCUTANEOUS at 12:11

## 2020-08-29 RX ADMIN — IPRATROPIUM BROMIDE AND ALBUTEROL SULFATE 1 AMPULE: .5; 3 SOLUTION RESPIRATORY (INHALATION) at 13:01

## 2020-08-29 RX ADMIN — OXYCODONE 10 MG: 5 TABLET ORAL at 03:12

## 2020-08-29 RX ADMIN — Medication 10 ML: at 00:24

## 2020-08-29 RX ADMIN — ALBUMIN (HUMAN) 25 G: 0.25 INJECTION, SOLUTION INTRAVENOUS at 13:38

## 2020-08-29 RX ADMIN — AMPICILLIN SODIUM AND SULBACTAM SODIUM 1.5 G: 1; .5 INJECTION, POWDER, FOR SOLUTION INTRAMUSCULAR; INTRAVENOUS at 03:08

## 2020-08-29 RX ADMIN — MIDODRINE HYDROCHLORIDE 10 MG: 5 TABLET ORAL at 08:30

## 2020-08-29 RX ADMIN — VANCOMYCIN HYDROCHLORIDE 250 MG: KIT at 08:30

## 2020-08-29 RX ADMIN — HYDROMORPHONE HYDROCHLORIDE 1 MG: 1 INJECTION, SOLUTION INTRAMUSCULAR; INTRAVENOUS; SUBCUTANEOUS at 00:23

## 2020-08-29 RX ADMIN — HYDROMORPHONE HYDROCHLORIDE 1 MG: 1 INJECTION, SOLUTION INTRAMUSCULAR; INTRAVENOUS; SUBCUTANEOUS at 04:27

## 2020-08-29 ASSESSMENT — PAIN SCALES - GENERAL
PAINLEVEL_OUTOF10: 10

## 2020-08-29 ASSESSMENT — PAIN DESCRIPTION - PAIN TYPE: TYPE: ACUTE PAIN

## 2020-08-29 ASSESSMENT — PAIN DESCRIPTION - LOCATION: LOCATION: GENERALIZED

## 2020-08-29 NOTE — PROGRESS NOTES
Harles Alcide, MD Eleonore Corpus, MD Brenda Dubin, MD               Office: [phone]                      Fax: [phone]             [address] [place] [address]                   NEPHROLOGY INPATIENT PROGRESS NOTE:     PATIENT NAME: Kashmir Reed  : 1955  MRN: [id number]      IMPRESSION:       KACEY (on no CKD): Worsening. .. .    - 5.4L removed by paracentesis . - BL Scr- 0.9  ---> 2.1 on admission. Improved to 1.1 but higher agagin. - Crea continues to worsen rapidly concerning for hepatorenal physiology type 1.    - Wero 14 --> 18   - Reviewed GI notes, not a candidate for liver transplant.    - HD would also increase mortality in this patient population. Fluid overload:   + severe leg edema + anasarca   : d/to lower albumin, liver disease  : unlikely NS  : US ABD:   Heterogeneous nodular appearance of the liver compatible with known cirrhosis. Gallbladder and common duct appear grossly unremarkable in appearance. Moderate amount of ascites. Reaccumulates rapidly.       : Na: Hyponatremia - d/to KACEY + cirrhosis - moderate - follow as mild. - Acid-Base: mid acidosis - non-AG - follow     - Anemia: follow Hgb      RECOMMENDATIONS:  : .    - continue to Hold Lasix and SPNL. - Continue albumin. - increase albumin   - keep Increased Midodrine.    - check PVR - for bladder retention to insert foleya    - Now DNR-CCA. Consider hospice  . - not a good candidate for HD.   - GI following  - at higher risk for worsening needing close monitoring   - overall poor prognosis d/to liver disease. High complexity. Multiple medical problems. Discussed with patient and treatment team. Nurse   Thank you for allowing me to participate in this patient's care. Please do not hesitate to contact me for any questions/concerns. We will follow along with you.      Brenda Dubin, MD  [facility] [address] [facility] [address]   Phone: [phone] or [address]  Fax: [phone]          Other problems: Management per primary and other consulting teams. Hospital Problems           Last Modified POA    * (Principal) Severe sepsis ([address] 8/25/2020 Yes    Cirrhosis ([address] 8/17/2020 Yes    Anasarca 8/18/2020 Yes    HCAP (healthcare-associated pneumonia) 8/17/2020 Yes    KACEY (acute kidney injury) ([address] 8/28/2020 Yes    Anemia 8/18/2020 Yes    Thrombocytopenia ([address] 8/18/2020 Yes    Coagulopathy ([address] 4/43/1931 Yes    Alcoholic cirrhosis ([address] 4/04/4494 Yes    Hyponatremia 8/18/2020 Yes    Moderate malnutrition ([address] (Chronic) 8/25/2020 Yes    Pneumonia due to methicillin susceptible Staphylococcus aureus (MSSA) ([address] 8/25/2020 Yes    SBP (spontaneous bacterial peritonitis) ([address] 8/25/2020 Yes    Chronic hepatitis C without hepatic coma ([address] 8/25/2020 Yes    History of alcoholism ([address] 8/25/2020 Yes    Ex-smoker 8/25/2020 Yes    Thrombocytopenia due to hypersplenism 8/27/2020 Yes            ========================================================  Subjective:   Seen resting in bed today  Denies SOB   Improving Edema and abdominal distention  Considering hospice     Past medical, Surgical, Social, Family medical history reviewed by me. MEDICATIONS: reviewed by me. Medications Prior to Admission:  No current facility-administered medications on file prior to encounter.       Current Outpatient Medications on File Prior to Encounter   Medication Sig Dispense Refill    furosemide (LASIX) 80 MG tablet Take 1 tablet by mouth daily 30 tablet 3    spironolactone (ALDACTONE) 100 MG tablet Take 1 tablet by mouth daily 30 tablet 3    potassium chloride (KLOR-CON M) 20 MEQ extended release tablet Take 1 tablet by mouth daily 30 tablet 5       Medications Prior to Admission: furosemide (LASIX) 80 MG tablet, Take 1 tablet by mouth daily  spironolactone (ALDACTONE) 100 MG tablet, Take 1 tablet by mouth daily  potassium chloride (KLOR-CON M) 20 MEQ extended release tablet, Take 1 tablet by mouth daily     Scheduled Meds:   ampicillin-sulbactam  1.5 g Intravenous Q12H    fluconazole  200 mg Intravenous Daily    midodrine  10 mg Oral TID WC    lactobacillus  1 capsule Oral BID     albumin human  12.5 g Intravenous Q6H    vancomycin  250 mg Oral 2 times per day    polyethylene glycol  17 g Oral BID    ipratropium-albuterol  1 ampule Inhalation Q4H WA    sodium chloride flush  10 mL Intravenous 2 times per day     Continuous Infusions:  PRN Meds:. REVIEW OF SYSTEMS:  As mentioned in chief complaint and HPI , Subjective       PHYSICAL EXAM:  Recent vital signs and recent I/Os reviewed by me. Wt Readings from Last 3 Encounters:   08/26/20 210 lb 8 oz (95.5 kg)   08/13/20 229 lb 6.4 oz (104.1 kg)   08/06/20 228 lb (103.4 kg)     BP Readings from Last 3 Encounters:   08/29/20 (!) 97/59   08/13/20 112/78   08/06/20 97/73     Patient Vitals for the past 24 hrs:   BP Temp Temp src Pulse Resp SpO2   08/29/20 0815 (!) 97/59 98.2 °F (36.8 °C) Axillary 83 16 91 %   08/29/20 0415 104/60 98.5 °F (36.9 °C) Oral 81 16 91 %   08/29/20 0015 110/65 98.3 °F (36.8 °C) Oral 74 16 94 %   08/28/20 2204 -- -- -- -- -- 91 %   08/28/20 2010 105/64 98.5 °F (36.9 °C) Oral 78 18 93 %   08/28/20 1605 102/60 99.1 °F (37.3 °C) Oral 80 17 94 %   08/28/20 1205 96/60 98.4 °F (36.9 °C) Oral 78 17 95 %   08/28/20 1000 93/60 -- -- -- -- --     No intake or output data in the 24 hours ending 08/29/20 0901       Physical exam:  General: Awake, Alert,   HENT: Atraumatic, normocephalic   Eyes: Normal conjunctiva, Non-incteral sclera. Neck: Supple, JVD not visible. CVS:  Heart sounds are normal. No murmurs. No pericardial rub. RS: Normal respiratory efforts,  Lung fields are clear.    Abd: Soft , bowel sounds are normal, distended  Skin: No rash ,  bruises,   CNS: Awake Oriented , No focal deficits  Extremities/MSK: 3+ Edema      DATA:  Diagnostic tests reviewed by me for today's visit:    Recent Labs 08/27/20  0542 08/28/20  0529 08/29/20  0507   WBC 16.5* 17.3* 15.2*   HCT 23.6* 24.0* 22.5*   PLT 85* 94*  --      Iron Saturation:  No components found for: PERCENTFE  FERRITIN:    Lab Results   Component Value Date    FERRITIN 864.6 08/18/2020     IRON:    Lab Results   Component Value Date    IRON 137 08/07/2018     TIBC:    Lab Results   Component Value Date    TIBC see below 08/07/2018       Recent Labs     08/27/20  0542 08/28/20  0529 08/29/20  0507   * 135* 132*   K 3.8 4.0 4.1   CL 99 101 99   CO2 23 22 20*   BUN 40* 46* 52*   CREATININE 2.6* 3.6* 4.1*     Recent Labs     08/27/20  0542 08/28/20  0529 08/29/20  0507   CALCIUM 8.3 8.2* 8.2*     No results for input(s): PH, PCO2, PO2 in the last 72 hours.     Invalid input(s): Randol Broccoli    ABG:  No results found for: PH, PCO2, PO2, HCO3, BE, THGB, TCO2, O2SAT  VBG:  No results found for: PHVEN, GNX4FQB, BEVEN, C0RPYXLO    LDH:    Lab Results   Component Value Date     08/18/2020     Uric Acid:    Lab Results   Component Value Date    LABURIC 8.1 08/17/2020       PT/INR:    Lab Results   Component Value Date    PROTIME 36.8 08/29/2020    INR 3.14 08/29/2020     Warfarin PT/INR:  No components found for: PTPATWAR, PTINRWAR  PTT:    Lab Results   Component Value Date    APTT 81.7 08/29/2020   [APTT}  Last 3 Troponin:    Lab Results   Component Value Date    TROPONINI <0.01 08/17/2020    TROPONINI <0.01 04/18/2019    TROPONINI <0.01 08/06/2018       U/A:    Lab Results   Component Value Date    COLORU Dark yellow 08/26/2020    PROTEINU 30 08/26/2020    PHUR 5.5 08/26/2020    WBCUA 14 08/26/2020    RBCUA 27 08/26/2020    MUCUS 3+ 08/06/2018    TRICHOMONAS Present 08/17/2020    BACTERIA 3+ 08/06/2018    CLARITYU Clear 08/26/2020    SPECGRAV 1.020 08/26/2020    LEUKOCYTESUR Negative 08/26/2020    UROBILINOGEN 0.2 08/26/2020    BILIRUBINUR MODERATE 08/26/2020    BLOODU SMALL 08/26/2020    GLUCOSEU Negative 08/26/2020    AMORPHOUS 2+ 08/06/2018     Microalbumen/Creatinine ratio:  No components found for: RUCREAT  24 Hour Urine for Protein:  No components found for: RAWUPRO, UHRS3, RWNS61BA, UTV3  24 Hour Urine for Creatinine Clearance:  No components found for: CREAT4, UHRS10, UTV10  Urine Toxicology:  No components found for: IAMMENTA, IBARBIT, IBENZO, ICOCAINE, IMARTHC, IOPIATES, IPHENCYC    HgBA1c:  No results found for: LABA1C  RPR:  No results found for: RPR  HIV:  No results found for: HIV  AZAR:    Lab Results   Component Value Date    AZAR Negative 08/07/2018     RF:  No results found for: RF  DSDNA:  No components found for: DNA  AMYLASE:  No results found for: AMYLASE  LIPASE:    Lab Results   Component Value Date    LIPASE 12.0 08/17/2020     Fibrinogen Level:  No components found for: FIB       BELOW MENTIONED RADIOLOGY STUDY RESULTS BY ME:    Xr Chest Portable    Result Date: 8/17/2020  EXAMINATION: ONE XRAY VIEW OF THE CHEST 8/17/2020 6:23 pm COMPARISON: 04/22/2019 HISTORY: ORDERING SYSTEM PROVIDED HISTORY: general fatigue TECHNOLOGIST PROVIDED HISTORY: Reason for exam:->general fatigue Reason for Exam: vLeg Swelling (Pt c/o pain to bilateral lower extremities with gross amount of swelling. Denies fever. reports decreased appetite. Hx liver failure. reports not taking prescribed medications because they havent been filled by the pharmacy. Gross abdominal swelling noted as well) Acuity: Unknown Type of Exam: Unknown FINDINGS: Lung volumes are low accentuating heart size and bronchovascular markings at the lung bases. There is left-sided pleural effusion, likely loculated, with adjacent left lung consolidation, increased compared to prior. Volume loss is also seen in the left hemithorax Hazy right basilar opacity is seen. Increasing pleuroparenchymal disease on the left.      Ir Us Guided Paracentesis    Result Date: 8/13/2020  PROCEDURE: ULTRASOUND GUIDED PARACENTESIS 8/13/2020 HISTORY: ORDERING SYSTEM PROVIDED HISTORY: Ascites due to alcoholic cirrhosis ([facility] TECHNIQUE: Informed consent was obtained after a detailed explanation of the procedure including risks, benefits, and alternatives. Universal protocol was followed. The right abdomen was prepped and draped in sterile fashion and local anesthesia was achieved with lidocaine. A 5 Western Kimberly Yueh needle sheath was advanced under ultrasound guidance into ascites and paracentesis was performed. The patient tolerated the procedure well. FINDINGS: A total of 5.5 L of yellow ascites was removed. Successful ultrasound guided paracentesis. Ir Us Guided Paracentesis    Result Date: 8/6/2020  PROCEDURE: ULTRASOUND GUIDED PARACENTESIS 8/6/2020 HISTORY: ORDERING SYSTEM PROVIDED HISTORY: Ascites due to alcoholic cirrhosis ([facility] TECHNIQUE: Informed consent was obtained after a detailed explanation of the procedure including risks, benefits, and alternatives. Universal protocol was followed. The right abdomen was prepped and draped in sterile fashion and local anesthesia was achieved with lidocaine. An 5 Australian needle sheath was advanced under ultrasound guidance into ascites and paracentesis was performed. The patient tolerated the procedure well. 5 L removed. FINDINGS: A total of 5 L clear yellow fluid removed. Was removed. Successful ultrasound guided paracentesis. Ir Us Guided Paracentesis    Result Date: 7/30/2020  PROCEDURE: ULTRASOUND GUIDED PARACENTESIS 7/30/2020 HISTORY: ORDERING SYSTEM PROVIDED HISTORY: Ascites due to alcoholic cirrhosis ([facility] TECHNIQUE: Informed consent was obtained after a detailed explanation of the procedure including risks, benefits, and alternatives. Universal protocol was followed. The right abdomen was prepped and draped in sterile fashion and local anesthesia was achieved with lidocaine. A 5 Australian needle sheath was advanced under ultrasound guidance into ascites and paracentesis was performed. The patient tolerated the procedure well. Estimated blood loss: Less than 5 cc FINDINGS: A total of 5.6 L was removed. Successful ultrasound guided paracentesis.      Ree Olmos MD

## 2020-08-29 NOTE — PROGRESS NOTES
[facility]    8/29/2020 12:28 PM        Name: Jesus Modi . Admitted: 8/17/2020  Primary Care Provider: No primary care provider on file. (Tel: None)                        Hospital course:   60 yo M with alcoholic cirrhosis, non compliance came to ER with complaints of anasarca.  Admitted as inpatient for HCAP, sepsis and KACEY.  COVID 19 negative on 8/18 from ED.   GI and Renal consulted.  Underwent 3.7 L removal of ascites by IR on 8/19    Subjective: Patient seen and examined. Complains of breakthrough abdominal pain in spite of Dilaudid and OxyContin. He is more lethargic today. No acute events overnight. Resting well. Pain control. Diet ok. Labs reviewed  Denies any chest pain sob.      Reviewed interval ancillary notes    Current Medications  albumin human 25 % IV solution 25 g, Q6H  HYDROmorphone HCl PF (DILAUDID) injection 1 mg, Q3H PRN  ampicillin-sulbactam (UNASYN) 1.5 g IVPB minibag, Q12H  fluconazole (DIFLUCAN) in 0.9 % sodium chloride IVPB 200 mg, Daily  midodrine (PROAMATINE) tablet 10 mg, TID WC  lactobacillus (CULTURELLE) capsule 1 capsule, BID WC  vancomycin (FIRVANQ) 50 MG/ML oral solution 250 mg, 2 times per day  oxyCODONE (ROXICODONE) immediate release tablet 5 mg, Q4H PRN    Or  oxyCODONE (ROXICODONE) immediate release tablet 10 mg, Q4H PRN  magnesium sulfate 1 g in dextrose 5% 100 mL IVPB, PRN  polyethylene glycol (GLYCOLAX) packet 17 g, BID  simethicone (MYLICON) chewable tablet 80 mg, Q6H PRN  potassium chloride (KLOR-CON M) extended release tablet 40 mEq, PRN    Or  potassium bicarb-citric acid (EFFER-K) effervescent tablet 40 mEq, PRN    Or  potassium chloride 10 mEq/100 mL IVPB (Peripheral Line), PRN  calcium carbonate (TUMS) chewable tablet 500 mg, TID PRN  ipratropium-albuterol (DUONEB) nebulizer solution 1 ampule, Q4H WA  sodium chloride flush 0.9 % injection 10 mL, 2 times per day  sodium chloride flush 0.9 % injection 10 mL, PRN  acetaminophen (TYLENOL) tablet 650 mg, Q6H PRN    Or  acetaminophen (TYLENOL) suppository 650 mg, Q6H PRN  senna (SENOKOT) tablet 8.6 mg, Daily PRN  ondansetron (ZOFRAN-ODT) disintegrating tablet 4 mg, Q8H PRN    Or  ondansetron (ZOFRAN) injection 4 mg, Q6H PRN  perflutren lipid microspheres (DEFINITY) injection 1.65 mg, ONCE PRN        Objective:  BP (!) 97/59   Pulse 83   Temp 98.2 °F (36.8 °C) (Axillary)   Resp 18   Ht 6' 2\" (1.88 m)   Wt 210 lb 8 oz (95.5 kg)   SpO2 91%   BMI 27.03 kg/m²     Intake/Output Summary (Last 24 hours) at 8/29/2020 1228  Last data filed at 8/29/2020 1047  Gross per 24 hour   Intake --   Output 284 ml   Net -284 ml      Wt Readings from Last 3 Encounters:   08/26/20 210 lb 8 oz (95.5 kg)   08/13/20 229 lb 6.4 oz (104.1 kg)   08/06/20 228 lb (103.4 kg)       General appearance: -American gentleman appears comfortable  Eyes: Sclera clear. Pupils equal.  ENT: Moist oral mucosa. Trachea midline, no adenopathy, neck supple. Cardiovascular: Regular rhythm, normal S1, S2. No murmur. No edema in lower extremities  Respiratory: Not using accessory muscles. Good inspiratory effort. Clear to auscultation bilaterally, no wheeze or crackles. GI: Abdomen soft, some tenderness, distended due to ascites, normal bowel sounds  Musculoskeletal: No deformity, ROM WNL. Neurology: CN 2-12 grossly intact. No speech or motor deficits  Psych: Normal affect.  Alert and oriented in time, place and person  Skin: Warm, dry, normal turgor  Extremities nonpitting edema noted  Labs and Tests:  CBC:   Recent Labs     08/27/20  0542 08/28/20  0529 08/29/20  0507   WBC 16.5* 17.3* 15.2*   HGB 8.0* 8.2* 7.6*   HCT 23.6* 24.0* 22.5*   .4* 112.0* 110.0*   PLT 85* 94* 86*     BMP:    Recent Labs     08/27/20  0542 08/28/20  0529 08/29/20  0507   * 135* 132*   K 3.8 4.0 4.1   CL 99 101 99   CO2 23 22 20*   BUN 40* 46* 52*   CREATININE 2.6* 3.6* 4.1*   GLUCOSE 78 83 89     Hepatic:   Recent Labs     08/27/20  0542 08/28/20  0529 08/29/20  0507   AST 38* 45* 48*   ALT 17 18 18   BILITOT 17.7* 18.6* 18.9*   ALKPHOS 50 46 46         Problem List  Principal Problem:    Severe sepsis (HCC)  Active Problems:    Cirrhosis ([facility]    Anasarca    HCAP (healthcare-associated pneumonia)    KACEY (acute kidney injury) ([facility]    Anemia    Thrombocytopenia (HCC)    Coagulopathy (HCC)    Alcoholic cirrhosis (HCC)    Hyponatremia    Moderate malnutrition (HCC)    Pneumonia due to methicillin susceptible Staphylococcus aureus (MSSA) (HCC)    SBP (spontaneous bacterial peritonitis) (HCC)    Chronic hepatitis C without hepatic coma (HCC)    History of alcoholism (HCC)    Ex-smoker    Thrombocytopenia due to hypersplenism  Resolved Problems:    * No resolved hospital problems. *     Assessment & Plan:      1.  Alcoholic cirrhosis:   Patient continues to have worsening liver function as evidenced by increasing INR, bilirubin, worsening hyponatremia  Hospice  2.  Ascites status post paracentesis    3.  Patient is on empiric antibiotic treatment  Ascitic PMNs continue to rise  WBC continues to rise  4.  Acute kidney injury on chronic kidney disease  Continue drip with rising creatinine, consistent with hepatorenal syndrome as per nephrology    Anticipate discharge to hospice care today.     Diet: DIET LOW SODIUM 2 GM;  Dysphagia Soft and Bite-Sized; 1200 ml  Code:DNR-CC  DVT PPX lovenox       Monse Chen MD   8/29/2020 12:28 PM

## 2020-08-29 NOTE — CARE COORDINATION
Tentative plan is for RNYayo from [facility] to complete arrangements for patient to discharge to the [place] [address] at [facility].

## 2020-08-29 NOTE — PROGRESS NOTES
Bladder seymour completed. 294 recorded. Pt states that he doesn't feel the urge to void.  Ray Lagunas RN

## 2020-08-29 NOTE — PROGRESS NOTES
Assessment complete. VSS no SOB or any distress noted. pt medicated for pain at 10/10. POC discussed and agreed upon. Pt given a snack and beverage per request. Call light within reach, pt encouraged to call if any needs arise. No further requests at this time. Will continue to monitor.

## 2020-08-30 LAB
BODY FLUID CULTURE, STERILE: NORMAL
GRAM STAIN RESULT: NORMAL

## 2020-08-30 NOTE — DISCHARGE SUMMARY
Hospital Medicine Discharge Summary    Patient ID: Randy Bhatia      Patient's PCP: No primary care provider on file. Admit Date: 8/17/2020     Discharge Date: 8/29/2020      Admitting Physician: Belén Gambino MD     Discharge Physician: Aidan Perera MD     Discharge Diagnoses: Active Hospital Problems    Diagnosis    Thrombocytopenia due to hypersplenism [D69.59]    Moderate malnutrition (HCC) [E44.0]    Pneumonia due to methicillin susceptible Staphylococcus aureus (MSSA) (HCC) [J15.211]    SBP (spontaneous bacterial peritonitis) (HCC) [K65.2]    Chronic hepatitis C without hepatic coma (HCC) [B18.2]    History of alcoholism ([address] [F10.21]    Ex-smoker [Z87.891]    Anemia [D64.9]    Thrombocytopenia ([address] [D69.6]    Coagulopathy ([address] [Q62.0]    Alcoholic cirrhosis ([address] [K14.74]    Hyponatremia [E87.1]    HCAP (healthcare-associated pneumonia) [J18.9]    Severe sepsis ([address] [A41.9, R65.20]    KACEY (acute kidney injury) ([address] [N17.9]    Anasarca [R60.1]    Cirrhosis ([address] [K74.60]       The patient was seen and examined on day of discharge and this discharge summary is in conjunction with any daily progress note from day of discharge. Hospital Course:        1.  Alcoholic cirrhosis:   Patient continues to have worsening liver function as evidenced by increasing INR, bilirubin, worsening hyponatremia  Hospice  2.  Ascites status post paracentesis    3.  Patient is on empiric antibiotic treatment  Ascitic PMNs continue to rise  WBC continues to rise  4.  Acute kidney injury on chronic kidney disease  Continue drip with rising creatinine, consistent with hepatorenal syndrome as per nephrology     Anticipate discharge to hospice care today.           Physical Exam Performed:     BP (!) 104/59   Pulse 83   Temp 98.2 °F (36.8 °C) (Axillary)   Resp 18   Ht 6' 2\" (1.88 m)   Wt 210 lb 8 oz (95.5 kg)   SpO2 92%   BMI 27.03 kg/m²       General appearance:  No apparent ultrasound guided paracentesis. XR ACUTE ABD SERIES CHEST 1 VW   Final Result   Nonspecific pattern. Proximal small bowel loops are mildly,   disproportionally dilated, although there is more distal gas. Generalized   ileus is favored over obstruction. Left pleural effusion. Consolidation in the left mid lung, versus inter   fissural pleural fluid. RECOMMENDATION:   CT abdomen and pelvis is a consideration. IR US GUIDED PARACENTESIS   Final Result   Successful ultrasound guided paracentesis. XR CHEST PORTABLE   Final Result   Stable appearance of left-sided pleuroparenchymal disease         IR US GUIDED PARACENTESIS   Final Result   Successful ultrasound guided paracentesis. US GALLBLADDER RUQ   Final Result   Heterogeneous nodular appearance of the liver compatible with known cirrhosis. Gallbladder and common duct appear grossly unremarkable in appearance. Moderate amount of ascites. XR CHEST PORTABLE   Final Result   Increasing pleuroparenchymal disease on the left. Consults:     IP CONSULT TO HOSPITALIST  IP CONSULT TO NEPHROLOGY  IP CONSULT TO GI  IP CONSULT TO NEPHROLOGY  IP CONSULT TO FINANCIAL COUNSELOR  IP CONSULT TO PALLIATIVE CARE  IP CONSULT TO PHARMACY  IP CONSULT TO INFECTIOUS DISEASES  IP CONSULT TO SPIRITUAL SERVICES  IP CONSULT TO HOSPICE  IP CONSULT TO HOSPICE    Disposition: Inpatient hospice    Condition at Discharge: Terminal    Discharge Instructions/Follow-up: Hospice treatment    Code Status:  Prior DNR CC    Activity: activity as tolerated    Diet: renal diet      Discharge Medications:     Discharge Medication List as of 8/29/2020  2:46 PM          Time Spent on discharge is more than 30 minutes in the examination, evaluation, counseling and review of medications and discharge plan.       Signed:    Electronically signed by Selena Dupree MD on 8/30/2020 at 1:08 PM

## 2020-09-07 NOTE — PROGRESS NOTES
Physician Progress Note      PATIENT:               Peg Thompson  CSN #:                  [id number]  :                       1955  ADMIT DATE:       2020 5:00 PM  [address] DATE:        2020 4:24 PM  RESPONDING  PROVIDER #:        Robin Trores MD          QUERY TEXT:    Patient admitted with sepsis/PNA. Noted documentation of sepsis in H&P. Please   indicate one of the following and document in the medical record: The medical record reflects the following:  Risk Factors: PNA, chronic illness  Clinical Indicators: POA: WBC 10.5, HR 69, RR 20 Temp 98.1, Lactic acid 3.6,   procalcitonin 0.49, No CRP, No Sed rate, Pt has PNA with KACEY. Treatment:  IV vancomycin and cefepime for H CAP coverage, imaging, labs,   cultures, GI consult    Thank you, Darryle Davies RN [email or web address]  Options provided:  -- Sepsis present as evidenced by, Please document evidence. -- Sepsis was ruled out after study  -- Other - I will add my own diagnosis  -- Disagree - Not applicable / Not valid  -- Disagree - Clinically unable to determine / Unknown  -- Refer to Clinical Documentation Reviewer    PROVIDER RESPONSE TEXT:    Sepsis is present as evidenced by WBC elevation, source of infection, lactic   acidosis, multiorgan disfunction.     Query created by: Michelle Wong on 2020 4:45 PM      Electronically signed by:  Robin Torres MD 2020 11:05 AM

## 2022-05-12 NOTE — FLOWSHEET NOTE
assisted patient in completing living will and healthcare power of . Patient has 13 siblings but chose his sister in [facility] as [place] with no alternates. Patient also has two adult sons in [facility] but has no contact with them.    gave patient a copy of advance directives and mailed a copy to his sister per patient's request. Continue your antibiotics given by your dentist     Thank you! Thank you for allowing me to care for you in the emergency department. I sincerely hope that you are satisfied with your visit today. It is my goal to provide you with excellent care. Below you will find a list of your labs and imaging from your visit today. Should you have any questions regarding these results please do not hesitate to call the emergency department. Labs -     Recent Results (from the past 12 hour(s))   CBC WITH AUTOMATED DIFF    Collection Time: 05/12/22  2:22 AM   Result Value Ref Range    WBC 12.3 (H) 3.6 - 11.0 K/uL    RBC 5.02 3.80 - 5.20 M/uL    HGB 10.4 (L) 11.5 - 16.0 g/dL    HCT 35.6 35.0 - 47.0 %    MCV 70.9 (L) 80.0 - 99.0 FL    MCH 20.7 (L) 26.0 - 34.0 PG    MCHC 29.2 (L) 30.0 - 36.5 g/dL    RDW 16.0 (H) 11.5 - 14.5 %    PLATELET 736 (H) 710 - 400 K/uL    MPV 10.0 8.9 - 12.9 FL    NRBC 0.0 0.0  WBC    ABSOLUTE NRBC 0.00 0.00 - 0.01 K/uL    NEUTROPHILS 72 32 - 75 %    LYMPHOCYTES 19 12 - 49 %    MONOCYTES 7 5 - 13 %    EOSINOPHILS 1 0 - 7 %    BASOPHILS 1 0 - 1 %    IMMATURE GRANULOCYTES 0 0 - 0.5 %    ABS. NEUTROPHILS 8.9 (H) 1.8 - 8.0 K/UL    ABS. LYMPHOCYTES 2.3 0.8 - 3.5 K/UL    ABS. MONOCYTES 0.9 0.0 - 1.0 K/UL    ABS. EOSINOPHILS 0.1 0.0 - 0.4 K/UL    ABS. BASOPHILS 0.1 0.0 - 0.1 K/UL    ABS. IMM.  GRANS. 0.0 0.00 - 0.04 K/UL    DF AUTOMATED     METABOLIC PANEL, COMPREHENSIVE    Collection Time: 05/12/22  2:22 AM   Result Value Ref Range    Sodium 134 (L) 136 - 145 mmol/L    Potassium 3.2 (L) 3.5 - 5.1 mmol/L    Chloride 99 97 - 108 mmol/L    CO2 27 21 - 32 mmol/L    Anion gap 8 5 - 15 mmol/L    Glucose 118 (H) 65 - 100 mg/dL    BUN 6 6 - 20 mg/dL    Creatinine 0.89 0.55 - 1.02 mg/dL    BUN/Creatinine ratio 7 (L) 12 - 20      GFR est AA >60 >60 ml/min/1.73m2    GFR est non-AA >60 >60 ml/min/1.73m2    Calcium 10.2 (H) 8.5 - 10.1 mg/dL    Bilirubin, total 0.6 0.2 - 1.0 mg/dL    AST (SGOT) 20 15 - 37 U/L ALT (SGPT) 17 12 - 78 U/L    Alk. phosphatase 77 45 - 117 U/L    Protein, total 9.2 (H) 6.4 - 8.2 g/dL    Albumin 4.4 3.5 - 5.0 g/dL    Globulin 4.8 (H) 2.0 - 4.0 g/dL    A-G Ratio 0.9 (L) 1.1 - 2.2     LACTIC ACID    Collection Time: 05/12/22  2:22 AM   Result Value Ref Range    Lactic acid 1.5 0.4 - 2.0 mmol/L       Radiologic Studies -   CT MAXILLOFACIAL W CONT   Final Result   Superficial and deep soft tissue swelling lateral to the mandible on   the left. Specific etiology is not seen. No discrete drainable abscess is   evident. Cellulitis? There is mild associated adenopathy. CT Results  (Last 48 hours)                 05/12/22 0340  CT MAXILLOFACIAL W CONT Final result    Impression:  Superficial and deep soft tissue swelling lateral to the mandible on   the left. Specific etiology is not seen. No discrete drainable abscess is   evident. Cellulitis? There is mild associated adenopathy. Narrative:  PROCEDURE: CT MAXILLOFACIAL W CONT       HISTORY:Left facial abscess       COMPARISON:None       Department policy stipulates all CT scans at this facility are performed using   dose reduction optimization techniques as appropriate to the performed exam,   including the following: Automated exposure control, adjustments of the mA   and/or KVP according to the patient size, and the use of iterative   reconstruction technique. TECHNIQUE: Axial images through the facial structures with reformats. Patient   received 100 mL Isovue-370 IV. LIMITATIONS: None       ORBITS: Normal   NASAL BONES: Normal   ZYGOMATIC ARCHES: Normal   MAXILLAE: Intact. PTERYGOID PLATES: Normal   MANDIBLE: Normal   SINUSES: Normal   SOFT TISSUES: On the left there is both superficial and deep soft tissue   swelling overlying the mandible. A discrete underlying bone or dental   abnormality is not appreciated. No focal fluid collection with an enhancing   margin suspicious for drainable abscess is evident. Mildly enlarged anterior cervical and submandibular lymph nodes are present   bilaterally       OTHER: None                 CXR Results  (Last 48 hours)      None               If you feel that you have not received excellent quality care or timely care, please ask to speak to the nurse manager. Please choose us in the future for your continued health care needs. ------------------------------------------------------------------------------------------------------------  The exam and treatment you received in the Emergency Department were for an urgent problem and are not intended as complete care. It is important that you follow-up with a doctor, nurse practitioner, or physician assistant to:  (1) confirm your diagnosis,  (2) re-evaluation of changes in your illness and treatment, and  (3) for ongoing care. If your symptoms become worse or you do not improve as expected and you are unable to reach your usual health care provider, you should return to the Emergency Department. We are available 24 hours a day. Please take your discharge instructions with you when you go to your follow-up appointment. If you have any problem arranging a follow-up appointment, contact the Emergency Department immediately. If a prescription has been provided, please have it filled as soon as possible to prevent a delay in treatment. Read the entire medication instruction sheet provided to you by the pharmacy. If you have any questions or reservations about taking the medication due to side effects or interactions with other medications, please call your primary care physician or contact the ER to speak with the charge nurse. Make an appointment with your family doctor or the physician you were referred to for follow-up of this visit as instructed on your discharge paperwork, as this is a mandatory follow-up. Return to the ER if you are unable to be seen or if you are unable to be seen in a timely manner.     If you have any problem arranging the follow-up visit, contact the Emergency Department immediately.

## 2025-02-12 NOTE — PROGRESS NOTES
General Surgery History and Physical  [facility]    Patient's Name/Date of Birth: Jeff Amador / 1974    Date: February 12, 2025     Surgeon: Emilio Oro M.D.    PCP: No primary care provider on file.     Chief Complaint: groin pain bilateral    HPI:   Jeff Amador is a 50 y.o. male who presents for evaluation of groin pain. Timing is constant, radiation to left more than right, alleviated by none and started weeks ago and severity is 7/10.  Patient presents for work seems to be complaints of left-sided groin pain but also intermittent right groin pain.  Has a history of a laparoscopic left inguinal hernia repair in 2023 and was doing well for about 6 months and then he states that he was performing some physical activity he knew he probably should not.  He states he has had ongoing intermittent pains on both the left side and right side since that time.  He has had a CT examination done in the emergency department in January 2025 that did not show any evidence of recurrent inguinal hernia on the left.    Patient Active Problem List   Diagnosis    Left inguinal hernia    Hypertensive urgency    Chronic pain syndrome    Opioid withdrawal (HCC)    Gastroenteritis       Past Medical History:   Diagnosis Date    Anxiety     Depression     HTN (hypertension)        Past Surgical History:   Procedure Laterality Date    BACK SURGERY  2002    Plating placed    COLONOSCOPY      HERNIA REPAIR Left 2/21/2023    LAPAROSCOPIC ROBOTIC LEFT INGUINAL HERNIA REPAIR WITH MESH performed by Emilio Oro MD at [facility] OR    STIMULATOR SURGERY  2017    for pain       Allergies   Allergen Reactions    Morphine Other (See Comments)    Buprenorphine Rash       The patient has a family history that is negative for severe cardiovascular or respiratory issues, negative for reaction to anesthesia.   Specifically the patient reported no history of gastrointestinal cancer in his mother or his father to their  Teaching / education initiated regarding perioperative experience, expectations, and pain management during stay. Patient verbalized understanding. Patient awake, alert and oriented. VSS. Denies pain. Call light in reach. Continue to monitor.   Hank Mcneil RN